# Patient Record
Sex: FEMALE | Race: WHITE | Employment: OTHER | URBAN - METROPOLITAN AREA
[De-identification: names, ages, dates, MRNs, and addresses within clinical notes are randomized per-mention and may not be internally consistent; named-entity substitution may affect disease eponyms.]

---

## 2017-01-30 ENCOUNTER — TELEPHONE (OUTPATIENT)
Dept: CARDIOLOGY CLINIC | Age: 67
End: 2017-01-30

## 2017-01-30 NOTE — TELEPHONE ENCOUNTER
Called patient. Verified patient's identity with two identifiers. Patient states she is pretty sure she took xarelto this morning, but asked if she should take another in case she didn't. I advised she not take another and resume tomorrow. Patient verbalizes understanding and denies further questions or concerns.

## 2017-01-30 NOTE — TELEPHONE ENCOUNTER
Please call Ms. King at [phone]. She got interrupted during the time in which she normally takes her Xarelto so she can't remember if she took it or not. She's afraid if she didn't take it that she may have a stroke and if she already took it that it would be a problem.      Thank you, Yvonne Maher

## 2017-03-03 ENCOUNTER — TELEPHONE (OUTPATIENT)
Dept: INTERNAL MEDICINE CLINIC | Age: 67
End: 2017-03-03

## 2017-03-03 NOTE — TELEPHONE ENCOUNTER
Patient wants to make sure she never gets Levaquin or anything in that drug family again -- would like that put in her chart.

## 2017-03-03 NOTE — TELEPHONE ENCOUNTER
Spoke with pt - states MD gave her Levaquin - she read the insert that said it can cause \"tendons to burst\" - she only took one pill. She now has pain in her right wrist and ankle but not all the time. When reviewing her chart - advised  Her this was given to her back in August of 2016. She said she knew and that she has had these issues ever since she took that one pill. Asked her why she had not called before - she didn't feel the need to until now. Advised her that her MD would not prescribe a medication that would harm her. That medication company has to put any side effect that may have occurred. Does not mean it will happen to her. She said she did not want medication ever again - wants it put in chart. This has been done.  Forward message to MD.

## 2017-03-17 DIAGNOSIS — I48.20 CHRONIC ATRIAL FIBRILLATION (HCC): ICD-10-CM

## 2017-03-17 DIAGNOSIS — I10 ESSENTIAL HYPERTENSION: ICD-10-CM

## 2017-03-17 NOTE — TELEPHONE ENCOUNTER
Requested Prescriptions     Signed Prescriptions Disp Refills    rivaroxaban (XARELTO) 20 mg tab tablet 90 Tab 3     Sig: Take 1 Tab by mouth daily.      Authorizing Provider: Seth Delgado     Ordering User: Norvell Bosch Per Dr. Leopoldo Peak verbal orders

## 2017-03-27 ENCOUNTER — TELEPHONE (OUTPATIENT)
Dept: INTERNAL MEDICINE CLINIC | Age: 67
End: 2017-03-27

## 2017-03-30 ENCOUNTER — OFFICE VISIT (OUTPATIENT)
Dept: CARDIOLOGY CLINIC | Age: 67
End: 2017-03-30

## 2017-03-30 ENCOUNTER — OFFICE VISIT (OUTPATIENT)
Dept: INTERNAL MEDICINE CLINIC | Age: 67
End: 2017-03-30

## 2017-03-30 ENCOUNTER — HOSPITAL ENCOUNTER (OUTPATIENT)
Dept: LAB | Age: 67
Discharge: HOME | End: 2017-03-30
Payer: MEDICARE

## 2017-03-30 VITALS
RESPIRATION RATE: 16 BRPM | WEIGHT: 202.6 LBS | BODY MASS INDEX: 38.25 KG/M2 | OXYGEN SATURATION: 97 % | DIASTOLIC BLOOD PRESSURE: 90 MMHG | HEIGHT: 61 IN | HEART RATE: 67 BPM | SYSTOLIC BLOOD PRESSURE: 136 MMHG

## 2017-03-30 VITALS
TEMPERATURE: 98.6 F | HEART RATE: 57 BPM | HEIGHT: 61 IN | WEIGHT: 202.8 LBS | BODY MASS INDEX: 38.29 KG/M2 | OXYGEN SATURATION: 95 % | SYSTOLIC BLOOD PRESSURE: 140 MMHG | DIASTOLIC BLOOD PRESSURE: 90 MMHG | RESPIRATION RATE: 16 BRPM

## 2017-03-30 DIAGNOSIS — Z13.5 GLAUCOMA SCREENING: ICD-10-CM

## 2017-03-30 DIAGNOSIS — I10 ESSENTIAL HYPERTENSION: ICD-10-CM

## 2017-03-30 DIAGNOSIS — F41.9 ANXIETY: ICD-10-CM

## 2017-03-30 DIAGNOSIS — Z12.39 BREAST CANCER SCREENING: ICD-10-CM

## 2017-03-30 DIAGNOSIS — I48.20 CHRONIC ATRIAL FIBRILLATION (HCC): Chronic | ICD-10-CM

## 2017-03-30 DIAGNOSIS — E78.2 MIXED HYPERLIPIDEMIA: ICD-10-CM

## 2017-03-30 DIAGNOSIS — G47.33 OSA (OBSTRUCTIVE SLEEP APNEA): ICD-10-CM

## 2017-03-30 DIAGNOSIS — R73.01 IFG (IMPAIRED FASTING GLUCOSE): Primary | ICD-10-CM

## 2017-03-30 DIAGNOSIS — I48.20 CHRONIC ATRIAL FIBRILLATION (HCC): Primary | ICD-10-CM

## 2017-03-30 PROCEDURE — 80053 COMPREHEN METABOLIC PANEL: CPT

## 2017-03-30 PROCEDURE — 85025 COMPLETE CBC W/AUTO DIFF WBC: CPT

## 2017-03-30 PROCEDURE — 80061 LIPID PANEL: CPT

## 2017-03-30 PROCEDURE — 36415 COLL VENOUS BLD VENIPUNCTURE: CPT

## 2017-03-30 PROCEDURE — 83036 HEMOGLOBIN GLYCOSYLATED A1C: CPT

## 2017-03-30 NOTE — MR AVS SNAPSHOT
Visit Information Date & Time Provider Department Dept. Phone Encounter #  
 3/30/2017  2:00 PM Harry Baron MD [facility] [place] [facility] [phone] [id number] Follow-up Instructions Return in about 6 months (around 9/30/2017). Your Appointments 3/30/2017  2:40 PM  
ROUTINE CARE with Trish Leong MD  
[address] [address] Internal Medicine [facility]) Appt Note: f/u; f/u  
 [address] [facility] [address]  
[address] [facility] [place] [address] Upcoming Health Maintenance Date Due  
 GLAUCOMA SCREENING Q2Y 11/18/2013 Pneumococcal 65+ Low/Medium Risk (2 of 2 - PCV13) 5/2/2015 INFLUENZA AGE 9 TO ADULT 8/1/2016 MEDICARE YEARLY EXAM 10/7/2016 BREAST CANCER SCRN MAMMOGRAM 3/27/2017 COLONOSCOPY 11/30/2022 DTaP/Tdap/Td series (2 - Td) 4/6/2024 Allergies as of 3/30/2017  Review Complete On: 3/30/2017 By: Harry Baron MD  
  
 Severity Noted Reaction Type Reactions Latex  11/29/2012    Other (comments) \"Trouble breathing\" Cardizem [Diltiazem Hcl]  11/30/2012    Unknown (comments) Ceclor [Cefaclor]  11/30/2012    Unknown (comments) Coffee (Coffea Arabica)  11/30/2012    Other (comments) Causes swollen neck glands Also states allergy to tea with same reaction Flexeril [Cyclobenzaprine]  11/30/2012    Other (comments)  
 insomnia Levaquin [Levofloxacin]  03/03/2017    Other (comments) Pt does not want medication since reading insert. Ludiomil  11/30/2012    Other (comments) depression Macrodantin [Nitrofurantoin Macrocrystalline]  11/30/2012    Unknown (comments) Other Medication  11/29/2012    Other (comments) Side effects to some BP meds. Unsure of names. Pt will call Dr. Lauren Rios office and have them fax the information. Information received and entered into chart. Allergic to syntex. Plendil [Felodipine]  11/30/2012    Other (comments) Flushing, chest pain Prinivil [Lisinopril]  11/30/2012    Unknown (comments) Procardia [Nifedipine]  11/30/2012    Other (comments) Burning feeling in brain. XL  
 Provera [Medroxyprogesterone]  11/30/2012    Other (comments) Finger pain Shellfish Derived  11/30/2012    Unknown (comments) Allergic to shrimp. Singulair [Montelukast]  11/30/2012    Other (comments)  
 faintness Tenoretic 100 [Atenolol-chlorthalidone]  11/30/2012    Other (comments) PO - headache Tenormin [Atenolol]  11/30/2012    Unknown (comments) Vistaril [Hydroxyzine Pamoate]  11/30/2012    Unknown (comments) Zocor [Simvastatin]  11/30/2012    Other (comments) Leg weakness Current Immunizations  Reviewed on 11/30/2012 Name Date Influenza High Dose Vaccine PF 10/22/2015 Influenza Vaccine Whole 9/30/2012 Pneumococcal Polysaccharide (PPSV-23) 5/2/2014  3:19 PM  
 Tdap 4/6/2014 Zoster Vaccine, Live 5/1/2015 Not reviewed this visit You Were Diagnosed With   
  
 Codes Comments Chronic atrial fibrillation (HCC)    -  Primary ICD-10-CM: O91.9 ICD-9-CM: 427.31 Mixed hyperlipidemia     ICD-10-CM: E78.2 ICD-9-CM: 272.2 Essential hypertension     ICD-10-CM: I10 
ICD-9-CM: 401.9 BERNIE (obstructive sleep apnea)     ICD-10-CM: G47.33 
ICD-9-CM: 327.23 Vitals BP Pulse Resp Height(growth percentile) Weight(growth percentile) SpO2  
 136/90 (BP 1 Location: Left arm, BP Patient Position: Sitting) 67 16 5' 1\" (1.549 m) 202 lb 9.6 oz (91.9 kg) 97% BMI OB Status Smoking Status 38.28 kg/m2 Postmenopausal Never Smoker Vitals History BMI and BSA Data Body Mass Index Body Surface Area  
 38.28 kg/m 2 1.99 m 2 Preferred Pharmacy Pharmacy Name Phone [address] [facility], [address] [facility] [address] Your Updated Medication List  
  
   
 This list is accurate as of: 3/30/17  2:28 PM.  Always use your most recent med list.  
  
  
  
  
 benazepril 10 mg tablet Commonly known as:  LOTENSIN  
TAKE 1 TABLET BY MOUTH TWICE A DAY  
  
 CATAPRES-TTS-3 0.3 mg/24 hr  
Generic drug:  cloNIDine  
ONE PATCH EVERY 8 DAYS Cholecalciferol (Vitamin D3) 3,000 unit Tab Take 3,000 Units by mouth daily. FISH -1,200 mg Cap Generic drug:  omega-3 fatty acids-fish oil Take 2 Tabs by mouth daily. lovastatin 20 mg tablet Commonly known as:  MEVACOR  
TAKE 1 TABLET BY MOUTH DAILY  
  
 rivaroxaban 20 mg Tab tablet Commonly known as:  Curtis Eulalio Take 1 Tab by mouth daily. SIMBRINZA 1-0.2 % Drps Generic drug:  brinzolamide-brimonidine Apply  to eye two (2) times a day. TUMS PO Take  by mouth as needed. verapamil  mg tablet Commonly known as:  CALAN-SR  
TAKE 1 TABLET BY MOUTH 2 TIMES A DAY  
  
 VITAMIN C 500 mg tablet Generic drug:  ascorbic acid (vitamin C) Take 1,000 mg by mouth nightly. Follow-up Instructions Return in about 6 months (around 9/30/2017). Introducing \[facility]\"" & HEALTH SERVICES! Dear Yolie Brown: 
Thank you for requesting a Rallyware account. Our records indicate that you already have an active Rallyware account. You can access your account anytime at [email or web address] Did you know that you can access your hospital and ER discharge instructions at any time in Rallyware? You can also review all of your test results from your hospital stay or ER visit. Additional Information If you have questions, please visit the Frequently Asked Questions section of the Rallyware website at [email or web address] Remember, Rallyware is NOT to be used for urgent needs. For medical emergencies, dial 911. Now available from your iPhone and Android! Please provide this summary of care documentation to your next provider. Your primary care clinician is listed as MAGDALENE MILLER. If you have any questions after today's visit, please call [phone].

## 2017-03-30 NOTE — MR AVS SNAPSHOT
Visit Information Date & Time Provider Department Dept. Phone Encounter #  
 3/30/2017  2:40 PM Otilia Thorpe, [address] [facility] Internal Medicine [phone] [id number] Follow-up Instructions Return in about 6 months (around 9/30/2017). Your Appointments 10/4/2017  2:00 PM  
ESTABLISHED PATIENT with Nicolás Hernadez MD  
[facility] ([facility]) Appt Note: 6 month follow up  
 [address] [place] [address]  
[address] [address] Upcoming Health Maintenance Date Due  
 GLAUCOMA SCREENING Q2Y 11/18/2013 Pneumococcal 65+ Low/Medium Risk (2 of 2 - PCV13) 5/2/2015 INFLUENZA AGE 9 TO ADULT 8/1/2016 MEDICARE YEARLY EXAM 10/7/2016 BREAST CANCER SCRN MAMMOGRAM 3/27/2017 COLONOSCOPY 11/30/2022 DTaP/Tdap/Td series (2 - Td) 4/6/2024 Allergies as of 3/30/2017  Review Complete On: 3/30/2017 By: Otilia Thorpe MD  
  
 Severity Noted Reaction Type Reactions Latex  11/29/2012    Other (comments) \"Trouble breathing\" Cardizem [Diltiazem Hcl]  11/30/2012    Unknown (comments) Ceclor [Cefaclor]  11/30/2012    Unknown (comments) Coffee (Coffea Arabica)  11/30/2012    Other (comments) Causes swollen neck glands Also states allergy to tea with same reaction Flexeril [Cyclobenzaprine]  11/30/2012    Other (comments)  
 insomnia Levaquin [Levofloxacin]  03/03/2017    Other (comments) Pt does not want medication since reading insert. - tendon pain Ludiomil  11/30/2012    Other (comments) depression Macrodantin [Nitrofurantoin Macrocrystalline]  11/30/2012    Unknown (comments) Other Medication  11/29/2012    Other (comments) Side effects to some BP meds. Unsure of names. Pt will call Dr. Patrice Rowley office and have them fax the information. Information received and entered into chart. Allergic to syntex. Plendil [Felodipine]  11/30/2012    Other (comments) Flushing, chest pain Prinivil [Lisinopril]  11/30/2012    Unknown (comments) Procardia [Nifedipine]  11/30/2012    Other (comments) Burning feeling in brain. XL  
 Provera [Medroxyprogesterone]  11/30/2012    Other (comments) Finger pain Shellfish Derived  11/30/2012    Unknown (comments) Allergic to shrimp. Singulair [Montelukast]  11/30/2012    Other (comments)  
 faintness Tenoretic 100 [Atenolol-chlorthalidone]  11/30/2012    Other (comments) PO - headache Tenormin [Atenolol]  11/30/2012    Unknown (comments) Vistaril [Hydroxyzine Pamoate]  11/30/2012    Unknown (comments) Zocor [Simvastatin]  11/30/2012    Other (comments) Leg weakness Current Immunizations  Reviewed on 3/30/2017 Name Date Influenza High Dose Vaccine PF 11/1/2016, 10/22/2015 Influenza Vaccine Whole 9/30/2012 Pneumococcal Conjugate (PCV-13) 11/1/2016 Pneumococcal Polysaccharide (PPSV-23) 5/2/2014  3:19 PM  
 Tdap 4/6/2014 Zoster Vaccine, Live 5/1/2015 Reviewed by Melani Mckeon on 3/30/2017 at  3:14 PM  
 Reviewed by Misael Gallagher MD on 3/30/2017 at  3:21 PM  
You Were Diagnosed With   
  
 Codes Comments IFG (impaired fasting glucose)    -  Primary ICD-10-CM: R73.01 
ICD-9-CM: 790.21 Breast cancer screening     ICD-10-CM: Z12.39 
ICD-9-CM: V76.10 Glaucoma screening     ICD-10-CM: Z13.5 ICD-9-CM: V80.1 Mixed hyperlipidemia     ICD-10-CM: E78.2 ICD-9-CM: 272.2 Chronic atrial fibrillation (HCC)     ICD-10-CM: I39.7 ICD-9-CM: 427.31 Anxiety     ICD-10-CM: F41.9 ICD-9-CM: 300.00 Vitals BP Pulse Temp Resp Height(growth percentile) Weight(growth percentile) 140/90 (BP 1 Location: Right arm, BP Patient Position: Sitting) (!) 57 98.6 °F (37 °C) (Oral) 16 5' 1\" (1.549 m) 202 lb 12.8 oz (92 kg) SpO2 BMI OB Status Smoking Status 95% 38.32 kg/m2 Postmenopausal Never Smoker Vitals History BMI and BSA Data Body Mass Index Body Surface Area  
 38.32 kg/m 2 1.99 m 2 Preferred Pharmacy Pharmacy Name Phone [id number] Debbie Esquivel, [address] [facility] [phone] Your Updated Medication List  
  
   
This list is accurate as of: 3/30/17  3:38 PM.  Always use your most recent med list.  
  
  
  
  
 benazepril 10 mg tablet Commonly known as:  LOTENSIN  
TAKE 1 TABLET BY MOUTH TWICE A DAY  
  
 CATAPRES-TTS-3 0.3 mg/24 hr  
Generic drug:  cloNIDine  
ONE PATCH EVERY 8 DAYS Cholecalciferol (Vitamin D3) 3,000 unit Tab Take 3,000 Units by mouth daily. FISH -1,200 mg Cap Generic drug:  omega-3 fatty acids-fish oil Take 2 Tabs by mouth daily. lovastatin 20 mg tablet Commonly known as:  MEVACOR  
TAKE 1 TABLET BY MOUTH DAILY  
  
 rivaroxaban 20 mg Tab tablet Commonly known as:  Danitza Daunt Take 1 Tab by mouth daily. SIMBRINZA 1-0.2 % Drps Generic drug:  brinzolamide-brimonidine Apply  to eye two (2) times a day. TUMS PO Take  by mouth as needed. verapamil  mg tablet Commonly known as:  CALAN-SR  
TAKE 1 TABLET BY MOUTH 2 TIMES A DAY  
  
 VITAMIN C 500 mg tablet Generic drug:  ascorbic acid (vitamin C) Take 1,000 mg by mouth nightly. We Performed the Following CBC WITH AUTOMATED DIFF [50327 CPT(R)] HEMOGLOBIN A1C WITH EAG [01721 CPT(R)] LIPID PANEL [74551 CPT(R)] METABOLIC PANEL, COMPREHENSIVE [28512 CPT(R)] REFERRAL TO OPHTHALMOLOGY [REF57 Custom] Follow-up Instructions Return in about 6 months (around 9/30/2017). To-Do List   
 03/31/2017 Imaging:  [facility] BI SCREENING INCL CAD Referral Information Referral ID Referred By Referred To  
  
 [id number] MAGDALENE MILLER Not Available Visits Status Start Date End Date 1 New Request 3/30/17 3/30/18 If your referral has a status of pending review or denied, additional information will be sent to support the outcome of this decision. Introducing [facility] & HEALTH SERVICES! Dear Leonid Pacheco: 
Thank you for requesting a Airbnb account. Our records indicate that you already have an active Airbnb account. You can access your account anytime at [email or web address] Did you know that you can access your hospital and ER discharge instructions at any time in Airbnb? You can also review all of your test results from your hospital stay or ER visit. Additional Information If you have questions, please visit the Frequently Asked Questions section of the Airbnb website at [email or web address] Remember, Airbnb is NOT to be used for urgent needs. For medical emergencies, dial 911. Now available from your iPhone and Android! Please provide this summary of care documentation to your next provider. Your primary care clinician is listed as MAGDALENE MILLER. If you have any questions after today's visit, please call [phone].

## 2017-03-30 NOTE — PROGRESS NOTES
HISTORY OF PRESENT ILLNESS  Porfirio Menendez is a 74 y.o. female     SUMMARY:   Problem List  Date Reviewed: 3/30/2017          Codes Class Noted    Active advance directive on file ICD-10-CM: Z78.9  ICD-9-CM: V49.89  8/1/2016        Chronic atrial fibrillation ([facility] [address] (Chronic) ICD-10-CM: I48.2  ICD-9-CM: 427.31  10/7/2015        Mixed hyperlipidemia ICD-10-CM: E78.2  ICD-9-CM: 272.2  10/7/2015        Microscopic hematuria ICD-10-CM: R31.29  ICD-9-CM: 599.72  10/7/2015        IFG (impaired fasting glucose) ICD-10-CM: R73.01  ICD-9-CM: 790.21  10/7/2015        Major depression ICD-10-CM: F32.9  ICD-9-CM: 296.20  4/6/2015        BERNIE (obstructive sleep apnea) ICD-10-CM: G47.33  ICD-9-CM: 327.23  11/17/2014        Cerebral aneurysm, nonruptured ICD-10-CM: I67.1  ICD-9-CM: 437.3  8/5/2014        Abnormal involuntary movements(781.0) ICD-9-CM: 781.0  8/4/2014        Atrial fibrillation (HCC) (Chronic) ICD-10-CM: I48.91  ICD-9-CM: 427.31  8/4/2014        Disturbance of skin sensation ICD-10-CM: R20.9  ICD-9-CM: 782.0  8/3/2014        Sleep apnea ICD-10-CM: G47.30  ICD-9-CM: 780.57  5/13/2014        Chest pain ICD-10-CM: R07.9  ICD-9-CM: 786.50  5/13/2014              Current Outpatient Prescriptions on File Prior to Visit   Medication Sig    rivaroxaban (XARELTO) 20 mg tab tablet Take 1 Tab by mouth daily.  lovastatin (MEVACOR) 20 mg tablet TAKE 1 TABLET BY MOUTH DAILY    benazepril (LOTENSIN) 10 mg tablet TAKE 1 TABLET BY MOUTH TWICE A DAY    CATAPRES-TTS-3 0.3 mg/24 hr ONE PATCH EVERY 8 DAYS    verapamil ER (CALAN-SR) 120 mg tablet TAKE 1 TABLET BY MOUTH 2 TIMES A DAY    brinzolamide-brimonidine (SIMBRINZA) 1-0.2 % drps Apply  to eye two (2) times a day.  ascorbic acid (VITAMIN C) 500 mg tablet Take 1,000 mg by mouth nightly.  Cholecalciferol, Vitamin D3, 3,000 unit tab Take 3,000 Units by mouth daily.  omega-3 fatty acids-fish oil (FISH OIL) 360-1,200 mg cap Take 2 Tabs by mouth daily.     CALCIUM CARBONATE (TUMS PO) Take  by mouth as needed. No current facility-administered medications on file prior to visit. CARDIOLOGY STUDIES TO DATE:  5/14 echo normal lvef, lae  514 lexiscan cardiolyte stress neg, lvef 70%      Chief Complaint   Patient presents with    Irregular Heart Beat     HPI :  Ms. Brady Winslow continues to have a lot of different complaints. She thinks her CPAP is not working. She is frustrated with her inability to get out and about. She is depressed, etc., but no cardiac complaints or concerns. She is still worried that she may have a stroke because of her atrial fib.     CARDIAC ROS:   negative for chest pain, dyspnea, palpitations, syncope, orthopnea, paroxysmal nocturnal dyspnea, exertional chest pressure/discomfort    Family History   Problem Relation Age of Onset    Diabetes Mother     Asthma Mother     Lung Disease Mother     Glaucoma Mother     Cancer Father     Heart Disease Father     Lung Disease Father     Psychiatric Disorder Father     Stroke Father     Other Sister      arthritis       Past Medical History:   Diagnosis Date    Aneurysm ([facility] [address]     supraclinoid/cerebral    Arthritis     Depression     Hypertension     Other ill-defined conditions(799.89)     glaucoma    Other ill-defined conditions(799.89)     increased cholesterol    Other ill-defined conditions(799.89)     scoliosis    Other ill-defined conditions(799.89)     back pain    Other ill-defined conditions(799.89)     insomnia    Other ill-defined conditions(799.89)     abnormal wt gain    Other ill-defined conditions(799.89)     breast lump - left side at 3 o'clock position    Other ill-defined conditions(799.89) 1994    shingles    Other ill-defined conditions(799.89)     CTS    Other ill-defined conditions(799.89)     colon polyps    Other ill-defined conditions(799.89)     cataracts    Other ill-defined conditions(799.89)     vitamin D deficiency - hx of    Other ill-defined conditions(799.89)     REYES    Psychiatric disorder     depression/personality disorder/OCD    Scoliosis     Sleep apnea     Unspecified sleep apnea     sleep study 15 yrs ago/was told to come back and get a CPAP, but pt did not       GENERAL ROS:  A comprehensive review of systems was negative except for: Respiratory: positive for cough    Visit Vitals    /90 (BP 1 Location: Left arm, BP Patient Position: Sitting)    Pulse 67    Resp 16    Ht 5' 1\" (1.549 m)    Wt 202 lb 9.6 oz (91.9 kg)    SpO2 97%    BMI 38.28 kg/m2       Wt Readings from Last 3 Encounters:   03/30/17 202 lb 9.6 oz (91.9 kg)   08/02/16 201 lb (91.2 kg)   08/01/16 197 lb (89.4 kg)            BP Readings from Last 3 Encounters:   03/30/17 136/90   08/02/16 110/76   08/01/16 130/70       PHYSICAL EXAM  General appearance: alert, cooperative, no distress, appears stated age  Neck: supple, symmetrical, trachea midline, no adenopathy, no carotid bruit and no JVD  Lungs: clear to auscultation bilaterally  Heart: irregularly irregular rhythm, S1, S2 normal, no S3 or S4  Extremities: edema tr    Lab Results   Component Value Date/Time    Cholesterol, total 176 08/01/2016 05:05 PM    Cholesterol, total 179 10/07/2015 03:59 PM    Cholesterol, total 185 04/06/2015 12:00 AM    Cholesterol, total 169 05/02/2014 04:25 AM    Cholesterol, total 153 12/13/2009 05:00 AM    HDL Cholesterol 54 08/01/2016 05:05 PM    HDL Cholesterol 73 10/07/2015 03:59 PM    HDL Cholesterol 70 04/06/2015 12:00 AM    HDL Cholesterol 62 05/02/2014 04:25 AM    HDL Cholesterol 49 12/13/2009 05:00 AM    LDL, calculated 56 08/01/2016 05:05 PM    LDL, calculated 76 10/07/2015 03:59 PM    LDL, calculated 76 04/06/2015 12:00 AM    LDL, calculated 63.8 05/02/2014 04:25 AM    LDL, calculated 82.2 12/13/2009 05:00 AM    Triglyceride 328 08/01/2016 05:05 PM    Triglyceride 148 10/07/2015 03:59 PM    Triglyceride 194 04/06/2015 12:00 AM    Triglyceride 216 05/02/2014 04:25 AM Triglyceride 109 12/13/2009 05:00 AM    CHOL/HDL Ratio 2.7 05/02/2014 04:25 AM    CHOL/HDL Ratio 3.1 12/13/2009 05:00 AM     ASSESSMENT  Ms. Jonathon Bernard appears to be stable and asymptomatic from a cardiac standpoint on a reasonable medical regimen. She needs no cardiac testing at this time. I tried to reassure her that everything that should be done with her atrial fibrillation is being done and not to worry. current treatment plan is effective, no change in therapy  lab results and schedule of future lab studies reviewed with patient  reviewed diet, exercise and weight control    Encounter Diagnoses   Name Primary?  Chronic atrial fibrillation (HCC) Yes    Mixed hyperlipidemia     Essential hypertension     BERNIE (obstructive sleep apnea)      No orders of the defined types were placed in this encounter. Follow-up Disposition:  Return in about 6 months (around 9/30/2017).     Sophia Avendano MD  3/30/2017

## 2017-03-30 NOTE — PROGRESS NOTES
HISTORY OF PRESENT ILLNESS  Sunni Limon is a 74 y.o. female. HPI Hay Ulrich is seen today for follow up of hyperlipidemia and other concerns. 1. Toenail problem. She has toenail fungus. Reviewed treatment options. She is not interested. Reviewed with her she would likely qualify for nail care based on this diagnosis. She will see a podiatrist if she is able to get transportation. 2. Skin lesion on the back of her right leg which turns out to be a seborrheic keratosis and she was reassured. 3. Essential hypertension. Stable on current regimen. 4. Mixed hyperlipidemia, IFG. Due for routine labs. 5. Atrial fibrillation. Up to date with cardiology follow up. She saw Dr. Jevon Sauer today. 6. Obstructive sleep apnea. She uses a CPAP apparatus. 7. Anxiety, depression. She denies carrying these diagnoses. We have discussed this in the past.  She clearly has anxiety. She declines medication treatment because she \"cannot take generics. \"  She has been on brand Prozac and Zoloft in the distant past.      MedDATA/gwo     Current Outpatient Prescriptions   Medication Sig    rivaroxaban (XARELTO) 20 mg tab tablet Take 1 Tab by mouth daily.  lovastatin (MEVACOR) 20 mg tablet TAKE 1 TABLET BY MOUTH DAILY    benazepril (LOTENSIN) 10 mg tablet TAKE 1 TABLET BY MOUTH TWICE A DAY    CATAPRES-TTS-3 0.3 mg/24 hr ONE PATCH EVERY 8 DAYS    verapamil ER (CALAN-SR) 120 mg tablet TAKE 1 TABLET BY MOUTH 2 TIMES A DAY    brinzolamide-brimonidine (SIMBRINZA) 1-0.2 % drps Apply  to eye two (2) times a day.  ascorbic acid (VITAMIN C) 500 mg tablet Take 1,000 mg by mouth nightly.  Cholecalciferol, Vitamin D3, 3,000 unit tab Take 3,000 Units by mouth daily.  omega-3 fatty acids-fish oil (FISH OIL) 360-1,200 mg cap Take 2 Tabs by mouth daily.  CALCIUM CARBONATE (TUMS PO) Take  by mouth as needed. No current facility-administered medications for this visit.           Review of Systems   Constitutional: Negative for weight loss. Respiratory: Negative. Cardiovascular: Negative for chest pain, palpitations, leg swelling and PND. Musculoskeletal: Negative for myalgias. Skin: Positive for rash. Neurological: Negative for focal weakness. Psychiatric/Behavioral: The patient is nervous/anxious. Physical Exam   Constitutional: She appears well-nourished. Neck: Carotid bruit is not present. Cardiovascular: Normal rate. An irregularly irregular rhythm present. Exam reveals no gallop and no friction rub. No murmur heard. Pulmonary/Chest: Effort normal and breath sounds normal. No respiratory distress. Musculoskeletal: She exhibits edema. Feet:    Trace stasis edema bilateral lower extremities. Skin:        Nursing note and vitals reviewed. ASSESSMENT and Zaid Prado was seen today for medication evaluation, nail problem and cyst.    Diagnoses and all orders for this visit:    IFG (impaired fasting glucose)  -     HEMOGLOBIN A1C WITH EAG  -     METABOLIC PANEL, COMPREHENSIVE    Breast cancer screening  -     CARL MAMMO BI SCREENING INCL CAD; Future    Glaucoma screening  -     REFERRAL TO OPHTHALMOLOGY    Mixed hyperlipidemia  -     LIPID PANEL  -     CBC WITH AUTOMATED DIFF    Chronic atrial fibrillation [facility])- See cardiologist as directed.      Anxiety- Follow off treatment     Other orders  -     CBC WITH AUTOMATED DIFF  -     METABOLIC PANEL, COMPREHENSIVE  -     LIPID PANEL  -     HEMOGLOBIN A1C

## 2017-03-31 LAB
ALBUMIN SERPL-MCNC: 4.3 G/DL (ref 3.6–4.8)
ALBUMIN/GLOB SERPL: 1.9 {RATIO} (ref 1.2–2.2)
ALP SERPL-CCNC: 53 IU/L (ref 39–117)
ALT SERPL-CCNC: 17 IU/L (ref 0–32)
AST SERPL-CCNC: 19 IU/L (ref 0–40)
BASOPHILS # BLD AUTO: 0 X10E3/UL (ref 0–0.2)
BASOPHILS NFR BLD AUTO: 0 %
BILIRUB SERPL-MCNC: 0.3 MG/DL (ref 0–1.2)
BUN SERPL-MCNC: 15 MG/DL (ref 8–27)
BUN/CREAT SERPL: 19 (ref 11–26)
CALCIUM SERPL-MCNC: 9.5 MG/DL (ref 8.7–10.3)
CHLORIDE SERPL-SCNC: 104 MMOL/L (ref 96–106)
CHOLEST SERPL-MCNC: 166 MG/DL (ref 100–199)
CO2 SERPL-SCNC: 28 MMOL/L (ref 18–29)
CREAT SERPL-MCNC: 0.79 MG/DL (ref 0.57–1)
EOSINOPHIL # BLD AUTO: 0.1 X10E3/UL (ref 0–0.4)
EOSINOPHIL NFR BLD AUTO: 1 %
ERYTHROCYTE [DISTWIDTH] IN BLOOD BY AUTOMATED COUNT: 13.5 % (ref 12.3–15.4)
EST. AVERAGE GLUCOSE BLD GHB EST-MCNC: 123 MG/DL
GLOBULIN SER CALC-MCNC: 2.3 G/DL (ref 1.5–4.5)
GLUCOSE SERPL-MCNC: 111 MG/DL (ref 65–99)
HBA1C MFR BLD: 5.9 % (ref 4.8–5.6)
HCT VFR BLD AUTO: 44.2 % (ref 34–46.6)
HDLC SERPL-MCNC: 56 MG/DL
HGB BLD-MCNC: 14.9 G/DL (ref 11.1–15.9)
IMM GRANULOCYTES # BLD: 0 X10E3/UL (ref 0–0.1)
IMM GRANULOCYTES NFR BLD: 0 %
LDLC SERPL CALC-MCNC: 64 MG/DL (ref 0–99)
LYMPHOCYTES # BLD AUTO: 1.6 X10E3/UL (ref 0.7–3.1)
LYMPHOCYTES NFR BLD AUTO: 17 %
MCH RBC QN AUTO: 30.7 PG (ref 26.6–33)
MCHC RBC AUTO-ENTMCNC: 33.7 G/DL (ref 31.5–35.7)
MCV RBC AUTO: 91 FL (ref 79–97)
MONOCYTES # BLD AUTO: 0.6 X10E3/UL (ref 0.1–0.9)
MONOCYTES NFR BLD AUTO: 6 %
NEUTROPHILS # BLD AUTO: 7.3 X10E3/UL (ref 1.4–7)
NEUTROPHILS NFR BLD AUTO: 76 %
PLATELET # BLD AUTO: 247 X10E3/UL (ref 150–379)
POTASSIUM SERPL-SCNC: 4.4 MMOL/L (ref 3.5–5.2)
PROT SERPL-MCNC: 6.6 G/DL (ref 6–8.5)
RBC # BLD AUTO: 4.86 X10E6/UL (ref 3.77–5.28)
SODIUM SERPL-SCNC: 146 MMOL/L (ref 134–144)
TRIGL SERPL-MCNC: 232 MG/DL (ref 0–149)
VLDLC SERPL CALC-MCNC: 46 MG/DL (ref 5–40)
WBC # BLD AUTO: 9.6 X10E3/UL (ref 3.4–10.8)

## 2017-04-07 ENCOUNTER — TELEPHONE (OUTPATIENT)
Dept: INTERNAL MEDICINE CLINIC | Age: 67
End: 2017-04-07

## 2017-04-12 NOTE — TELEPHONE ENCOUNTER
Pt returned call informed pt of Dr. Adrienne Padron lab result 4/5/17 mychart msg. Copy of mychart msg and lab results have been placed to be mailed to the pt per her request. Pt states she had her last eye exam at Dr. Sam Crystal office informed pt we would call them to have last eye exam report sent over. Pt also requesting recommendations for 3d mammogram services, [facility] so she can pick which she prefers to call to schedule.

## 2017-04-28 ENCOUNTER — TELEPHONE (OUTPATIENT)
Dept: INTERNAL MEDICINE CLINIC | Age: 67
End: 2017-04-28

## 2017-04-28 NOTE — TELEPHONE ENCOUNTER
Spoke with pt - states she is requesting CXR for a cough she's had for about a month. States she was to get done last year for same reason but never did it. An order is in chart from 8/1/16. Will forward to MD for approval. Calderon Meredith will call her Monday when MD returns to office.

## 2017-05-01 DIAGNOSIS — R05.9 COUGH: Primary | ICD-10-CM

## 2017-05-03 ENCOUNTER — OFFICE VISIT (OUTPATIENT)
Dept: SLEEP MEDICINE | Age: 67
End: 2017-05-03

## 2017-05-03 VITALS
HEIGHT: 61 IN | HEART RATE: 92 BPM | DIASTOLIC BLOOD PRESSURE: 76 MMHG | SYSTOLIC BLOOD PRESSURE: 130 MMHG | OXYGEN SATURATION: 97 % | WEIGHT: 197 LBS | BODY MASS INDEX: 37.19 KG/M2

## 2017-05-03 DIAGNOSIS — G47.33 OSA (OBSTRUCTIVE SLEEP APNEA): Primary | ICD-10-CM

## 2017-05-03 DIAGNOSIS — E66.9 OBESITY (BMI 30-39.9): ICD-10-CM

## 2017-05-03 NOTE — PATIENT INSTRUCTIONS
[address] [place]. [place], [address]  Tel.  [phone]  Fax. [address] [facility] [address]  [place], [address] [facility]  Tel.  [phone]  Fax. [phone] [address]  [place]  Tel.  [phone]  Fax. [phone]     Learning About CPAP for Sleep Apnea  What is CPAP? CPAP is a small machine that you use at home every night while you sleep. It increases air pressure in your throat to keep your airway open. When you have sleep apnea, this can help you sleep better so you feel much better. CPAP stands for \"continuous positive airway pressure. \"  The CPAP machine will have one of the following:  · A mask that covers your nose and mouth  · Prongs that fit into your nose  · A mask that covers your nose only, the most common type. This type is called NCPAP. The N stands for \"nasal.\"  Why is it done? CPAP is usually the best treatment for obstructive sleep apnea. It is the first treatment choice and the most widely used. Your doctor may suggest CPAP if you have:  · Moderate to severe sleep apnea. · Sleep apnea and coronary artery disease (CAD) or heart failure. How does it help? · CPAP can help you have more normal sleep, so you feel less sleepy and more alert during the daytime. · CPAP may help keep heart failure or other heart problems from getting worse. · NCPAP may help lower your blood pressure. · If you use CPAP, your bed partner may also sleep better because you are not snoring or restless. What are the side effects? Some people who use CPAP have:  · A dry or stuffy nose and a sore throat. · Irritated skin on the face. · Sore eyes. · Bloating. If you have any of these problems, work with your doctor to fix them. Here are some things you can try:  · Be sure the mask or nasal prongs fit well. · See if your doctor can adjust the pressure of your CPAP. · If your nose is dry, try a humidifier.   · If your nose is runny or stuffy, try decongestant medicine or a steroid nasal spray. If these things do not help, you might try a different type of machine. Some machines have air pressure that adjusts on its own. Others have air pressures that are different when you breathe in than when you breathe out. This may reduce discomfort caused by too much pressure in your nose. Where can you learn more? Go to Belly.be  Enter Isha Gonsales in the search box to learn more about \"Learning About CPAP for Sleep Apnea. \"   © 8384-5674 Healthwise, Incorporated. Care instructions adapted under license by [employer] (which disclaims liability or warranty for this information). This care instruction is for use with your licensed healthcare professional. If you have questions about a medical condition or this instruction, always ask your healthcare professional. [address] any warranty or liability for your use of this information. Content Version: 3.1.36283; Last Revised: January 11, 2010  PROPER SLEEP HYGIENE    What to avoid  · Do not have drinks with caffeine, such as coffee or black tea, for 8 hours before bed. · Do not smoke or use other types of tobacco near bedtime. Nicotine is a stimulant and can keep you awake. · Avoid drinking alcohol late in the evening, because it can cause you to wake in the middle of the night. · Do not eat a big meal close to bedtime. If you are hungry, eat a light snack. · Do not drink a lot of water close to bedtime, because the need to urinate may wake you up during the night. · Do not read or watch TV in bed. Use the bed only for sleeping and sexual activity. What to try  · Go to bed at the same time every night, and wake up at the same time every morning. Do not take naps during the day. · Keep your bedroom quiet, dark, and cool. · Get regular exercise, but not within 3 to 4 hours of your bedtime. .  · Sleep on a comfortable pillow and mattress.   · If watching the clock makes you anxious, turn it facing away from you so you cannot see the time. · If you worry when you lie down, start a worry book. Well before bedtime, write down your worries, and then set the book and your concerns aside. · Try meditation or other relaxation techniques before you go to bed. · If you cannot fall asleep, get up and go to another room until you feel sleepy. Do something relaxing. Repeat your bedtime routine before you go to bed again. · Make your house quiet and calm about an hour before bedtime. Turn down the lights, turn off the TV, log off the computer, and turn down the volume on music. This can help you relax after a busy day. Drowsy Driving: The [employer] cites drowsiness as a causing factor in more than 207,824 police reported crashes annually, resulting in 76,000 injuries and 1,500 deaths. Other surveys suggest 55% of people polled have driven while drowsy in the past year, 23% had fallen asleep but not crashed, 3% crashed, and 2% had and accident due to drowsy driving. Who is at risk? Young Drivers: One study of drowsy driving accidents states that 55% of the drivers were under 25 years. Of those, 75% were male. Shift Workers and Travelers: People who work overnight or travel across time zones frequently are at higher risk of experiencing Circadian Rhythm Disorders. They are trying to work and function when their body is programed to sleep. Sleep Deprived: Lack of sleep has a serious impact on your ability to pay attention or focus on a task. Consistently getting less than the average of 8 hours your body needs creates partial or cumulative sleep deprivation. Untreated Sleep Disorders: Sleep Apnea, Narcolepsy, R.L.S., and other sleep disorders (untreated) prevent a person from getting enough restful sleep. This leads to excessive daytime sleepiness and increases the risk for drowsy driving accidents by up to 7 times.   Medications / Alcohol: Even over the counter medications can cause drowsiness. Medications that impair a drivers attention should have a warning label. Alcohol naturally makes you sleepy and on its own can cause accidents. Combined with excessive drowsiness its effects are amplified. Signs of Drowsy Driving:   * You don't remember driving the last few miles   * You may drift out of your wilma   * You are unable to focus and your thoughts wander   * You may yawn more often than normal   * You have difficulty keeping your eyes open / nodding off   * Missing traffic signs, speeding, or tailgating  Prevention-   Good sleep hygiene, lifestyle and behavioral choices have the most impact on drowsy driving. There is no substitute for sleep and the average person requires 8 hours nightly. If you find yourself driving drowsy, stop and sleep. Consider the sleep hygiene tips provided during your visit as well. Medication Refill Policy: Refills for all medications require 1 week advance notice. Please have your pharmacy fax a refill request. We are unable to fax, or call in \"controled substance\" medications and you will need to pick these prescriptions up from our office. Yatango Activation    Thank you for requesting access to Yatango. Please follow the instructions below to securely access and download your online medical record. Yatango allows you to send messages to your doctor, view your test results, renew your prescriptions, schedule appointments, and more. How Do I Sign Up? 1. In your internet browser, go to [email or web address]. 2. Click on the First Time User? Click Here link in the Sign In box. You will see the New Member Sign Up page. 3. Enter your Yatango Access Code exactly as it appears below. You will not need to use this code after youve completed the sign-up process. If you do not sign up before the expiration date, you must request a new code. Yatango Access Code:  Activation code not generated  Current Gurubooks Status: Active (This is the date your Gurubooks access code will )    4. Enter the last four digits of your Social Security Number (xxxx) and Date of Birth (mm/dd/yyyy) as indicated and click Submit. You will be taken to the next sign-up page. 5. Create a Gurubooks ID. This will be your Gurubooks login ID and cannot be changed, so think of one that is secure and easy to remember. 6. Create a Gurubooks password. You can change your password at any time. 7. Enter your Password Reset Question and Answer. This can be used at a later time if you forget your password. 8. Enter your e-mail address. You will receive e-mail notification when new information is available in [address] 9. Click Sign Up. You can now view and download portions of your medical record. 10. Click the Download Summary menu link to download a portable copy of your medical information. Additional Information    If you have questions, please call [phone]. Remember, Gurubooks is NOT to be used for urgent needs. For medical emergencies, dial 911. Starting a Weight Loss Plan: After Your Visit  Your Care Instructions  If you are thinking about losing weight, it can be hard to know where to start. Your doctor can help you set up a weight loss plan that best meets your needs. You may want to take a class on nutrition or exercise, or join a weight loss support group. If you have questions about how to make changes to your eating or exercise habits, ask your doctor about seeing a registered dietitian or an exercise specialist.  It can be a big challenge to lose weight. But you do not have to make huge changes at once. Make small changes, and stick with them. When those changes become habit, add a few more changes. If you do not think you are ready to make changes right now, try to pick a date in the future.  Make an appointment to see your doctor to discuss whether the time is right for you to start a plan.  Follow-up care is a key part of your treatment and safety. Be sure to make and go to all appointments, and call your doctor if you are having problems. It's also a good idea to know your test results and keep a list of the medicines you take. How can you care for yourself at home? · Set realistic goals. Many people expect to lose much more weight than is likely. A weight loss of 5% to 10% of your body weight may be enough to improve your health. · Get family and friends involved to provide support. Talk to them about why you are trying to lose weight, and ask them to help. They can help by participating in exercise and having meals with you, even if they may be eating something different. · Find what works best for you. If you do not have time or do not like to cook, a program that offers meal replacement bars or shakes may be better for you. Or if you like to prepare meals, finding a plan that includes daily menus and recipes may be best.  · Ask your doctor about other health professionals who can help you achieve your weight loss goals. ¨ A dietitian can help you make healthy changes in your diet. ¨ An exercise specialist or  can help you develop a safe and effective exercise program.  ¨ A counselor or psychiatrist can help you cope with issues such as depression, anxiety, or family problems that can make it hard to focus on weight loss. · Consider joining a support group for people who are trying to lose weight. Your doctor can suggest groups in your area. Where can you learn more? Go to DataSphere.be  Enter U357 in the search box to learn more about \"Starting a Weight Loss Plan: After Your Visit. \"   © 8813-7109 Healthwise, Incorporated. Care instructions adapted under license by Priyanka Albright (which disclaims liability or warranty for this information).  This care instruction is for use with your licensed healthcare professional. If you have questions about a medical condition or this instruction, always ask your healthcare professional. [place] [address] any warranty or liability for your use of this information.   Content Version: 9.5.20731; Last Revised: September 1, 2009

## 2017-05-03 NOTE — MR AVS SNAPSHOT
Visit Information Date & Time Provider Department Dept. Phone Encounter #  
 5/3/2017  3:20 PM Jorge Ferrari, [facility] [place] [facility] [id number] Follow-up Instructions Return in about 1 year (around 5/3/2018), or if symptoms worsen or fail to improve. Follow-up and Disposition History Your Appointments 7/24/2017  1:00 PM  
ROUTINE CARE with Pooja Hess MD  
[facility] Appt Note: f/u [address] [place]  
[address] [address] [place]  
  
    
 10/4/2017  2:00 PM  
ESTABLISHED PATIENT with August Strong MD  
[facility] ([facility]) Appt Note: 6 month follow up  
 [address] [place] [address]  
[address] [facility] [address] Upcoming Health Maintenance Date Due  
 MEDICARE YEARLY EXAM 10/7/2016 BREAST CANCER SCRN MAMMOGRAM 3/27/2017 INFLUENZA AGE 9 TO ADULT 8/1/2017 GLAUCOMA SCREENING Q2Y 1/12/2019 COLONOSCOPY 11/30/2022 DTaP/Tdap/Td series (2 - Td) 4/6/2024 Allergies as of 5/3/2017  Review Complete On: 5/3/2017 By: Jorge Ferrari MD  
  
 Severity Noted Reaction Type Reactions Latex  11/29/2012    Other (comments) \"Trouble breathing\" Cardizem [Diltiazem Hcl]  11/30/2012    Unknown (comments) Ceclor [Cefaclor]  11/30/2012    Unknown (comments) Coffee (Coffea Arabica)  11/30/2012    Other (comments) Causes swollen neck glands Also states allergy to tea with same reaction Flexeril [Cyclobenzaprine]  11/30/2012    Other (comments)  
 insomnia Levaquin [Levofloxacin]  03/03/2017    Other (comments) Pt does not want medication since reading insert. - tendon pain Ludiomil  11/30/2012    Other (comments) depression Macrodantin [Nitrofurantoin Macrocrystalline]  11/30/2012    Unknown (comments) Other Medication  11/29/2012    Other (comments) Side effects to some BP meds. Unsure of names. Pt will call Dr. Lidya Philip office and have them fax the information. Information received and entered into chart. Allergic to syntex. Plendil [Felodipine]  11/30/2012    Other (comments) Flushing, chest pain Prinivil [Lisinopril]  11/30/2012    Unknown (comments) Procardia [Nifedipine]  11/30/2012    Other (comments) Burning feeling in brain. XL  
 Provera [Medroxyprogesterone]  11/30/2012    Other (comments) Finger pain Shellfish Derived  11/30/2012    Unknown (comments) Allergic to shrimp. Singulair [Montelukast]  11/30/2012    Other (comments)  
 faintness Tenoretic 100 [Atenolol-chlorthalidone]  11/30/2012    Other (comments) PO - headache Tenormin [Atenolol]  11/30/2012    Unknown (comments) Vistaril [Hydroxyzine Pamoate]  11/30/2012    Unknown (comments) Zocor [Simvastatin]  11/30/2012    Other (comments) Leg weakness Current Immunizations  Reviewed on 3/30/2017 Name Date Influenza High Dose Vaccine PF 11/1/2016, 10/22/2015 Influenza Vaccine Whole 9/30/2012 Pneumococcal Conjugate (PCV-13) 11/1/2016 Pneumococcal Polysaccharide (PPSV-23) 5/2/2014  3:19 PM  
 Tdap 4/6/2014 Zoster Vaccine, Live 5/1/2015 Not reviewed this visit You Were Diagnosed With   
  
 Codes Comments BERNIE (obstructive sleep apnea)    -  Primary ICD-10-CM: G47.33 
ICD-9-CM: 327.23 Obesity (BMI 30-39. 9)     ICD-10-CM: E66.9 ICD-9-CM: 278.00 Vitals BP Pulse Height(growth percentile) Weight(growth percentile) SpO2 BMI  
 130/76 92 5' 1\" (1.549 m) 197 lb (89.4 kg) 97% 37.22 kg/m2 OB Status Smoking Status Postmenopausal Never Smoker BMI and BSA Data Body Mass Index Body Surface Area  
 37.22 kg/m 2 1.96 m 2 Preferred Pharmacy Pharmacy Name Phone [address] [facility], [address] [facility] [address] Donnie Michaels Your Updated Medication List  
  
   
This list is accurate as of: 5/3/17  3:41 PM.  Always use your most recent med list.  
  
  
  
  
 benazepril 10 mg tablet Commonly known as:  LOTENSIN  
TAKE 1 TABLET BY MOUTH TWICE A DAY  
  
 CATAPRES-TTS-3 0.3 mg/24 hr  
Generic drug:  cloNIDine  
ONE PATCH EVERY 8 DAYS Cholecalciferol (Vitamin D3) 3,000 unit Tab Take 3,000 Units by mouth daily. FISH -1,200 mg Cap Generic drug:  omega-3 fatty acids-fish oil Take 2 Tabs by mouth daily. lovastatin 20 mg tablet Commonly known as:  MEVACOR  
TAKE 1 TABLET BY MOUTH DAILY  
  
 rivaroxaban 20 mg Tab tablet Commonly known as:  Rodrigo Cork Take 1 Tab by mouth daily. SIMBRINZA 1-0.2 % Drps Generic drug:  brinzolamide-brimonidine Apply  to eye two (2) times a day. TUMS PO Take  by mouth as needed. verapamil  mg tablet Commonly known as:  CALAN-SR  
TAKE 1 TABLET BY MOUTH 2 TIMES A DAY  
  
 VITAMIN C 500 mg tablet Generic drug:  ascorbic acid (vitamin C) Take 1,000 mg by mouth nightly. We Performed the Following AMB SUPPLY ORDER [[id number] Custom] Comments: * Device pressure change to CPAP  10-20 cmH2O. Wireless modem enrollment with privileges to change therapy remotely - indefinite. PAP Mask -patient preference, tubing, filters as needed (all sleep supplies) Length of Need = 99 months Daija Newman M.D.  (electronically signed) Diplomate in Sleep Medicine, [employer] Follow-up Instructions Return in about 1 year (around 5/3/2018), or if symptoms worsen or fail to improve. Patient Instructions [address] [place]. [address] [facility], [address] Tel.  [phone] Fax. 100 [facility] 60 [place], [address] [facility] Tel.  [phone] Fax. [phone] 52 [facility] [address] Tel.  [phone] Fax. [phone] Learning About CPAP for Sleep Apnea What is CPAP? CPAP is a small machine that you use at home every night while you sleep. It increases air pressure in your throat to keep your airway open. When you have sleep apnea, this can help you sleep better so you feel much better. CPAP stands for \"continuous positive airway pressure. \" The CPAP machine will have one of the following: · A mask that covers your nose and mouth · Prongs that fit into your nose · A mask that covers your nose only, the most common type. This type is called NCPAP. The N stands for \"nasal.\" Why is it done? CPAP is usually the best treatment for obstructive sleep apnea. It is the first treatment choice and the most widely used. Your doctor may suggest CPAP if you have: · Moderate to severe sleep apnea. · Sleep apnea and coronary artery disease (CAD) or heart failure. How does it help? · CPAP can help you have more normal sleep, so you feel less sleepy and more alert during the daytime. · CPAP may help keep heart failure or other heart problems from getting worse. · NCPAP may help lower your blood pressure. · If you use CPAP, your bed partner may also sleep better because you are not snoring or restless. What are the side effects? Some people who use CPAP have: · A dry or stuffy nose and a sore throat. · Irritated skin on the face. · Sore eyes. · Bloating. If you have any of these problems, work with your doctor to fix them. Here are some things you can try: · Be sure the mask or nasal prongs fit well. · See if your doctor can adjust the pressure of your CPAP. · If your nose is dry, try a humidifier. · If your nose is runny or stuffy, try decongestant medicine or a steroid nasal spray. If these things do not help, you might try a different type of machine. Some machines have air pressure that adjusts on its own.  Others have air pressures that are different when you breathe in than when you breathe out. This may reduce discomfort caused by too much pressure in your nose. Where can you learn more? Go to Trigger.io.be Enter M655 in the search box to learn more about \"Learning About CPAP for Sleep Apnea. \"  
© 0570-4734 Healthwise, Incorporated. Care instructions adapted under license by Cindi Mujica (which disclaims liability or warranty for this information). This care instruction is for use with your licensed healthcare professional. If you have questions about a medical condition or this instruction, always ask your healthcare professional. [place] [address] any warranty or liability for your use of this information. Content Version: 4.0.33248; Last Revised: January 11, 2010 PROPER SLEEP HYGIENE What to avoid · Do not have drinks with caffeine, such as coffee or black tea, for 8 hours before bed. · Do not smoke or use other types of tobacco near bedtime. Nicotine is a stimulant and can keep you awake. · Avoid drinking alcohol late in the evening, because it can cause you to wake in the middle of the night. · Do not eat a big meal close to bedtime. If you are hungry, eat a light snack. · Do not drink a lot of water close to bedtime, because the need to urinate may wake you up during the night. · Do not read or watch TV in bed. Use the bed only for sleeping and sexual activity. What to try · Go to bed at the same time every night, and wake up at the same time every morning. Do not take naps during the day. · Keep your bedroom quiet, dark, and cool. · Get regular exercise, but not within 3 to 4 hours of your bedtime. Perla Guerin · Sleep on a comfortable pillow and mattress. · If watching the clock makes you anxious, turn it facing away from you so you cannot see the time. · If you worry when you lie down, start a worry book.  Well before bedtime, write down your worries, and then set the book and your concerns aside. · Try meditation or other relaxation techniques before you go to bed. · If you cannot fall asleep, get up and go to another room until you feel sleepy. Do something relaxing. Repeat your bedtime routine before you go to bed again. · Make your house quiet and calm about an hour before bedtime. Turn down the lights, turn off the TV, log off the computer, and turn down the volume on music. This can help you relax after a busy day. Drowsy Driving: The [employer] cites drowsiness as a causing factor in more than 015,674 police reported crashes annually, resulting in 76,000 injuries and 1,500 deaths. Other surveys suggest 55% of people polled have driven while drowsy in the past year, 23% had fallen asleep but not crashed, 3% crashed, and 2% had and accident due to drowsy driving. Who is at risk? Young Drivers: One study of drowsy driving accidents states that 55% of the drivers were under 25 years. Of those, 75% were male. Shift Workers and Travelers: People who work overnight or travel across time zones frequently are at higher risk of experiencing Circadian Rhythm Disorders. They are trying to work and function when their body is programed to sleep. Sleep Deprived: Lack of sleep has a serious impact on your ability to pay attention or focus on a task. Consistently getting less than the average of 8 hours your body needs creates partial or cumulative sleep deprivation. Untreated Sleep Disorders: Sleep Apnea, Narcolepsy, R.L.S., and other sleep disorders (untreated) prevent a person from getting enough restful sleep. This leads to excessive daytime sleepiness and increases the risk for drowsy driving accidents by up to 7 times. Medications / Alcohol: Even over the counter medications can cause drowsiness.  Medications that impair a drivers attention should have a warning label. Alcohol naturally makes you sleepy and on its own can cause accidents. Combined with excessive drowsiness its effects are amplified. Signs of Drowsy Driving: * You don't remember driving the last few miles * You may drift out of your wilma * You are unable to focus and your thoughts wander * You may yawn more often than normal 
 * You have difficulty keeping your eyes open / nodding off * Missing traffic signs, speeding, or tailgating Prevention-  
Good sleep hygiene, lifestyle and behavioral choices have the most impact on drowsy driving. There is no substitute for sleep and the average person requires 8 hours nightly. If you find yourself driving drowsy, stop and sleep. Consider the sleep hygiene tips provided during your visit as well. Medication Refill Policy: Refills for all medications require 1 week advance notice. Please have your pharmacy fax a refill request. We are unable to fax, or call in \"controled substance\" medications and you will need to pick these prescriptions up from our office. Recensus Activation Thank you for requesting access to Recensus. Please follow the instructions below to securely access and download your online medical record. Recensus allows you to send messages to your doctor, view your test results, renew your prescriptions, schedule appointments, and more. How Do I Sign Up? 1. In your internet browser, go to [email or web address]. 2. Click on the First Time User? Click Here link in the Sign In box. You will see the New Member Sign Up page. 3. Enter your Recensus Access Code exactly as it appears below. You will not need to use this code after youve completed the sign-up process. If you do not sign up before the expiration date, you must request a new code. Recensus Access Code: Activation code not generated Current Recensus Status: Active (This is the date your Recensus access code will ) 4. Enter the last four digits of your Social Security Number (xxxx) and Date of Birth (mm/dd/yyyy) as indicated and click Submit. You will be taken to the next sign-up page. 5. Create a Shiftboard Online Scheduling ID. This will be your Shiftboard Online Scheduling login ID and cannot be changed, so think of one that is secure and easy to remember. 6. Create a Shiftboard Online Scheduling password. You can change your password at any time. 7. Enter your Password Reset Question and Answer. This can be used at a later time if you forget your password. 8. Enter your e-mail address. You will receive e-mail notification when new information is available in [address] 9. Click Sign Up. You can now view and download portions of your medical record. 10. Click the Download Summary menu link to download a portable copy of your medical information. Additional Information If you have questions, please call [phone]. Remember, Shiftboard Online Scheduling is NOT to be used for urgent needs. For medical emergencies, dial 911. Starting a Weight Loss Plan: After Your Visit Your Care Instructions If you are thinking about losing weight, it can be hard to know where to start. Your doctor can help you set up a weight loss plan that best meets your needs. You may want to take a class on nutrition or exercise, or join a weight loss support group. If you have questions about how to make changes to your eating or exercise habits, ask your doctor about seeing a registered dietitian or an exercise specialist. 
It can be a big challenge to lose weight. But you do not have to make huge changes at once. Make small changes, and stick with them. When those changes become habit, add a few more changes. If you do not think you are ready to make changes right now, try to pick a date in the future. Make an appointment to see your doctor to discuss whether the time is right for you to start a plan. Follow-up care is a key part of your treatment and safety.  Be sure to make and go to all appointments, and call your doctor if you are having problems. It's also a good idea to know your test results and keep a list of the medicines you take. How can you care for yourself at home? · Set realistic goals. Many people expect to lose much more weight than is likely. A weight loss of 5% to 10% of your body weight may be enough to improve your health. · Get family and friends involved to provide support. Talk to them about why you are trying to lose weight, and ask them to help. They can help by participating in exercise and having meals with you, even if they may be eating something different. · Find what works best for you. If you do not have time or do not like to cook, a program that offers meal replacement bars or shakes may be better for you. Or if you like to prepare meals, finding a plan that includes daily menus and recipes may be best. 
· Ask your doctor about other health professionals who can help you achieve your weight loss goals. ¨ A dietitian can help you make healthy changes in your diet. ¨ An exercise specialist or  can help you develop a safe and effective exercise program. 
¨ A counselor or psychiatrist can help you cope with issues such as depression, anxiety, or family problems that can make it hard to focus on weight loss. · Consider joining a support group for people who are trying to lose weight. Your doctor can suggest groups in your area. Where can you learn more? Go to Highcon.be Enter O782 in the search box to learn more about \"Starting a Weight Loss Plan: After Your Visit. \"  
© 9693-8892 Healthwise, Incorporated. Care instructions adapted under license by Jovanna Zazueta (which disclaims liability or warranty for this information).  This care instruction is for use with your licensed healthcare professional. If you have questions about a medical condition or this instruction, always ask your healthcare professional. [address] any warranty or liability for your use of this information. Content Version: 9..10652; Last Revised: September 1, 2009 Introducing [facility]! [facility]: 
Thank you for requesting a Clifton account. Our records indicate that you already have an active Clifton account. You can access your account anytime at [email or web address]://[email or web address]. [employer] Did you know that you can access your hospital and ER discharge instructions at any time in Clifton? You can also review all of your test results from your hospital stay or ER visit. Additional Information If you have questions, please visit the Frequently Asked Questions section of the Clifton website at [email or web address] Remember, Clifton is NOT to be used for urgent needs. For medical emergencies, dial 911. Now available from your iPhone and Android! Please provide this summary of care documentation to your next provider. Your primary care clinician is listed as MAGDALENE MILLER. If you have any questions after today's visit, please call [phone].

## 2017-05-03 NOTE — PROGRESS NOTES
[address] [facility] [place]. [place], [address]  Tel.  [phone]  Fax. 100 [facility] [address]  [place], [address] [facility]  Tel.  [phone]  Fax. [phone] [address] [facility] [address]   Tel.  [phone]  Fax. [phone]     S>Michelle Jacobs is a 74 y.o. female seen for a positive airway pressure follow-up. She reports no problems using the device. The following problems are identified:    Drowsiness no Problems exhaling no   Snoring yes Forget to put on no   Mask Comfortable yes Can't fall asleep no   Dry Mouth no Mask falls off no   Air Leaking no Frequent awakenings no         She admits that her sleep has improved with CPAP. Allergies   Allergen Reactions    Latex Other (comments)     \"Trouble breathing\"    Cardizem [Diltiazem Hcl] Unknown (comments)    Ceclor [Cefaclor] Unknown (comments)    Coffee (Coffea Arabica) Other (comments)     Causes swollen neck glands    Also states allergy to tea with same reaction    Flexeril [Cyclobenzaprine] Other (comments)     insomnia    Levaquin [Levofloxacin] Other (comments)     Pt does not want medication since reading insert. - tendon pain    Ludiomil Other (comments)     depression    Macrodantin [Nitrofurantoin Macrocrystalline] Unknown (comments)    Other Medication Other (comments)     Side effects to some BP meds. Unsure of names. Pt will call Dr. Kris Ramirez office and have them fax the information. Information received and entered into chart. Allergic to syntex.  Plendil [Felodipine] Other (comments)     Flushing, chest pain    Prinivil [Lisinopril] Unknown (comments)    Procardia [Nifedipine] Other (comments)     Burning feeling in brain. XL    Provera [Medroxyprogesterone] Other (comments)     Finger pain    Shellfish Derived Unknown (comments)     Allergic to shrimp.     Singulair [Montelukast] Other (comments)     faintness    Tenoretic 100 [Atenolol-Chlorthalidone] Other (comments)     PO - headache    Tenormin [Atenolol] Unknown (comments)    Vistaril [Hydroxyzine Pamoate] Unknown (comments)    Zocor [Simvastatin] Other (comments)     Leg weakness       She has a current medication list which includes the following prescription(s): rivaroxaban, lovastatin, benazepril, catapres-tts-3, verapamil er, brinzolamide-brimonidine, ascorbic acid (vitamin c), cholecalciferol (vitamin d3), omega-3 fatty acids-fish oil, and calcium carbonate. .      She  has a past medical history of Aneurysm ([facility] [address] Arrhythmia; Arthritis; Depression; Diabetes ([facility] Hypertension; Ill-defined condition; Ill-defined condition; Other ill-defined conditions; Other ill-defined conditions; Other ill-defined conditions; Other ill-defined conditions; Other ill-defined conditions; Other ill-defined conditions; Other ill-defined conditions; Other ill-defined conditions (1994); Other ill-defined conditions; Other ill-defined conditions; Other ill-defined conditions; Other ill-defined conditions; Other ill-defined conditions; Psychiatric disorder; Scoliosis; Sleep apnea; and Unspecified sleep apnea. [place] Sleepiness Score: 11   and Modified F.O.S.Q. Score Total / 2: 17.5   which reflect improved sleep quality over therapy time. O>    Visit Vitals    /76    Pulse 92    Ht 5' 1\" (1.549 m)    Wt 197 lb (89.4 kg)    SpO2 97%    BMI 37.22 kg/m2    Neck circ. in \"inches\": 16      General:   Alert, oriented, not in distress   Neck:   No JVD    Chest/Lungs:  symetrical lung expansion , no accessory muscle use    Extremities:  no obvious rashes , negative edema    Neuro:  No focal deficits ; No obvious tremor    Psych:  Normal affect ,  Normal countenance ;           A>    ICD-10-CM ICD-9-CM    1. BERNIE (obstructive sleep apnea) G47.33 327.23 AMB SUPPLY ORDER   2. Obesity (BMI 30-39. 9) E66.9 278.00      AHI = 11 (2014). On CPAP :  12 (Check 9) cmH2O.     Compliant:      yes    Therapeutic Response:  Positive    P>      Orders Placed This Encounter    AMB SUPPLY ORDER     * Device pressure change to CPAP  10-20 cmH2O. Wireless modem enrollment with privileges to change therapy remotely - indefinite. PAP Mask -patient preference, tubing, filters as needed (all sleep supplies)  Length of Need = 99 months    Pastor Jah M.D.  (electronically signed)  Diplomate in Sleep Medicine, [facility]   * PAP card download in 4 weeks. PAP clinic if adherence remains poor  * Follow-up Disposition:  Return in about 1 year (around 5/3/2018), or if symptoms worsen or fail to improve. * She was asked to contact our office for any problems regarding PAP therapy. * Counseling was provided regarding the importance of regular PAP use and on proper sleep hygiene and safe driving. * Re-enforced proper and regular cleaning for the device. Discussed the patient's above normal BMI with her. I have recommended the following interventions: dietary management education, guidance, and counseling . The BMI follow up plan is as follows: BMI is out of normal parameters and plan is as follows: I have counseled this patient on diet and exercise regimens    Thank you for allowing us to participate in your patient's medical care. Pastor Jah M.D.   Diplomate in Sleep Medicine, [facility]

## 2017-05-04 ENCOUNTER — DOCUMENTATION ONLY (OUTPATIENT)
Dept: SLEEP MEDICINE | Age: 67
End: 2017-05-04

## 2017-06-13 ENCOUNTER — TELEPHONE (OUTPATIENT)
Dept: INTERNAL MEDICINE CLINIC | Age: 67
End: 2017-06-13

## 2017-06-13 DIAGNOSIS — R73.01 IFG (IMPAIRED FASTING GLUCOSE): ICD-10-CM

## 2017-06-13 DIAGNOSIS — E78.2 MIXED HYPERLIPIDEMIA: Primary | ICD-10-CM

## 2017-06-13 NOTE — TELEPHONE ENCOUNTER
Spoke with pt - advised she has appt tomorrow 6/14 at 2:20 pm. She states she wanted to have labs done before her appt. Lab slip at .

## 2017-06-14 ENCOUNTER — OFFICE VISIT (OUTPATIENT)
Dept: INTERNAL MEDICINE CLINIC | Age: 67
End: 2017-06-14

## 2017-06-14 ENCOUNTER — HOSPITAL ENCOUNTER (OUTPATIENT)
Dept: LAB | Age: 67
Discharge: HOME | End: 2017-06-14
Payer: MEDICARE

## 2017-06-14 VITALS
OXYGEN SATURATION: 98 % | BODY MASS INDEX: 37.34 KG/M2 | WEIGHT: 197.8 LBS | SYSTOLIC BLOOD PRESSURE: 132 MMHG | HEART RATE: 71 BPM | RESPIRATION RATE: 16 BRPM | DIASTOLIC BLOOD PRESSURE: 84 MMHG | HEIGHT: 61 IN | TEMPERATURE: 97.8 F

## 2017-06-14 DIAGNOSIS — I48.20 CHRONIC ATRIAL FIBRILLATION (HCC): Chronic | ICD-10-CM

## 2017-06-14 DIAGNOSIS — E78.2 MIXED HYPERLIPIDEMIA: ICD-10-CM

## 2017-06-14 DIAGNOSIS — R73.01 IFG (IMPAIRED FASTING GLUCOSE): ICD-10-CM

## 2017-06-14 DIAGNOSIS — F41.9 ANXIETY: Primary | ICD-10-CM

## 2017-06-14 PROCEDURE — 83036 HEMOGLOBIN GLYCOSYLATED A1C: CPT

## 2017-06-14 PROCEDURE — 80061 LIPID PANEL: CPT

## 2017-06-14 PROCEDURE — 80053 COMPREHEN METABOLIC PANEL: CPT

## 2017-06-14 NOTE — MR AVS SNAPSHOT
Visit Information Date & Time Provider Department Dept. Phone Encounter #  
 6/14/2017  2:20 PM Bhavani Lin, [facility] [phone] [id number] Follow-up Instructions Return in about 6 months (around 12/14/2017) for Wellness Visit. Your Appointments 10/4/2017  2:00 PM  
ESTABLISHED PATIENT with Garwin Lefort, MD  
[facility] ([address]) Appt Note: 6 month follow up  
 [address]  
[address] [facility],[address] [address] Upcoming Health Maintenance Date Due  
 MEDICARE YEARLY EXAM 10/7/2016 BREAST CANCER SCRN MAMMOGRAM 3/27/2017 INFLUENZA AGE 9 TO ADULT 8/1/2017 GLAUCOMA SCREENING Q2Y 1/12/2019 COLONOSCOPY 11/30/2022 DTaP/Tdap/Td series (2 - Td) 4/6/2024 Allergies as of 6/14/2017  Review Complete On: 6/14/2017 By: Bhavani Lin MD  
  
 Severity Noted Reaction Type Reactions Latex  11/29/2012    Other (comments) \"Trouble breathing\" Cardizem [Diltiazem Hcl]  11/30/2012    Unknown (comments) Ceclor [Cefaclor]  11/30/2012    Unknown (comments) Coffee (Coffea Arabica)  11/30/2012    Other (comments) Causes swollen neck glands Also states allergy to tea with same reaction Flexeril [Cyclobenzaprine]  11/30/2012    Other (comments)  
 insomnia Levaquin [Levofloxacin]  03/03/2017    Other (comments) Pt does not want medication since reading insert. - tendon pain Ludiomil  11/30/2012    Other (comments) depression Macrodantin [Nitrofurantoin Macrocrystalline]  11/30/2012    Unknown (comments) Other Medication  11/29/2012    Other (comments) Side effects to some BP meds. Unsure of names. Pt will call Dr. Roberta Fox office and have them fax the information. Information received and entered into chart. Allergic to syntex. Plendil [Felodipine]  11/30/2012    Other (comments) Flushing, chest pain Prinivil [Lisinopril]  11/30/2012    Unknown (comments) Procardia [Nifedipine]  11/30/2012    Other (comments) Burning feeling in brain. XL  
 Provera [Medroxyprogesterone]  11/30/2012    Other (comments) Finger pain Shellfish Derived  11/30/2012    Unknown (comments) Allergic to shrimp. Singulair [Montelukast]  11/30/2012    Other (comments)  
 faintness Tenoretic 100 [Atenolol-chlorthalidone]  11/30/2012    Other (comments) PO - headache Tenormin [Atenolol]  11/30/2012    Unknown (comments) Vistaril [Hydroxyzine Pamoate]  11/30/2012    Unknown (comments) Zocor [Simvastatin]  11/30/2012    Other (comments) Leg weakness Current Immunizations  Reviewed on 3/30/2017 Name Date Influenza High Dose Vaccine PF 11/1/2016, 10/22/2015 Influenza Vaccine Whole 9/30/2012 Pneumococcal Conjugate (PCV-13) 11/1/2016 Pneumococcal Polysaccharide (PPSV-23) 5/2/2014  3:19 PM  
 Tdap 4/6/2014 Zoster Vaccine, Live 5/1/2015 Not reviewed this visit You Were Diagnosed With   
  
 Codes Comments Anxiety    -  Primary ICD-10-CM: F41.9 ICD-9-CM: 300.00 IFG (impaired fasting glucose)     ICD-10-CM: R73.01 
ICD-9-CM: 790.21 Mixed hyperlipidemia     ICD-10-CM: E78.2 ICD-9-CM: 272.2 Chronic atrial fibrillation (HCC)     ICD-10-CM: T79.7 ICD-9-CM: 427.31 Vitals BP Pulse Temp Resp Height(growth percentile) Weight(growth percentile) 132/84 71 97.8 °F (36.6 °C) (Oral) 16 5' 1\" (1.549 m) 197 lb 12.8 oz (89.7 kg) SpO2 BMI OB Status Smoking Status 98% 37.37 kg/m2 Postmenopausal Never Smoker Vitals History BMI and BSA Data Body Mass Index Body Surface Area  
 37.37 kg/m 2 1.96 m 2 Preferred Pharmacy Pharmacy Name Phone [address] [facility], [address] [facility] [address] Your Updated Medication List  
  
   
 This list is accurate as of: 6/14/17  3:04 PM.  Always use your most recent med list.  
  
  
  
  
 benazepril 10 mg tablet Commonly known as:  LOTENSIN  
TAKE 1 TABLET BY MOUTH TWICE A DAY  
  
 CATAPRES-TTS-3 0.3 mg/24 hr  
Generic drug:  cloNIDine  
ONE PATCH EVERY 8 DAYS Cholecalciferol (Vitamin D3) 3,000 unit Tab Take 3,000 Units by mouth daily. FISH -1,200 mg Cap Generic drug:  omega-3 fatty acids-fish oil Take 2 Tabs by mouth daily. lovastatin 20 mg tablet Commonly known as:  MEVACOR  
TAKE 1 TABLET BY MOUTH DAILY  
  
 rivaroxaban 20 mg Tab tablet Commonly known as:  Alva Metter Take 1 Tab by mouth daily. SIMBRINZA 1-0.2 % Drps Generic drug:  brinzolamide-brimonidine Apply  to eye two (2) times a day. TUMS PO Take  by mouth as needed. verapamil  mg tablet Commonly known as:  CALAN-SR  
TAKE 1 TABLET BY MOUTH 2 TIMES A DAY  
  
 VITAMIN C 500 mg tablet Generic drug:  ascorbic acid (vitamin C) Take 1,000 mg by mouth nightly. Follow-up Instructions Return in about 6 months (around 12/14/2017) for Wellness Visit. Introducing [facility]! Dear Anya Ugarte: 
Thank you for requesting a [employer] account. Our records indicate that you already have an active [employer] account. You can access your account anytime at [email or web address] Did you know that you can access your hospital and ER discharge instructions at any time in [employer]? You can also review all of your test results from your hospital stay or ER visit. Additional Information If you have questions, please visit the Frequently Asked Questions section of the [employer] website at [email or web address] Remember, [employer] is NOT to be used for urgent needs. For medical emergencies, dial 911. Now available from your iPhone and Android! Please provide this summary of care documentation to your next provider. Your primary care clinician is listed as MAGDALENE MILLER. If you have any questions after today's visit, please call [phone].

## 2017-06-14 NOTE — PROGRESS NOTES
HISTORY OF PRESENT ILLNESS  Rosemary Walton is a 74 y.o. female. [facility] [place] Darshan Fernandez is seen today for evaluation of anxiety and depression along with other troubles. 1. Anxiety with depression. This is longstanding. She has generally not been on been on  medication or they have provided no benefits. Treatment is supportive. There have been some very stressful events at her home. She lives in [address]. She was given a notice of poor housekeeping which apparently upon two citations is grounds for eviction. This has her very very worried. She believes the action was retaliatory from the  with whom she does not relate well. She requests a letter to [facility] to request an inspection of her home to confirm the concerns regarding poor housekeeping. I told her this would be fine. She will let me know exactly where the letter needs to go. 2. IFG, hyperlipidemia. Due for routine lab. 3. Atrial fibrillation. Up to date with specialist follow up. MedDATA/gwo     Current Outpatient Prescriptions   Medication Sig    rivaroxaban (XARELTO) 20 mg tab tablet Take 1 Tab by mouth daily.  lovastatin (MEVACOR) 20 mg tablet TAKE 1 TABLET BY MOUTH DAILY    benazepril (LOTENSIN) 10 mg tablet TAKE 1 TABLET BY MOUTH TWICE A DAY    CATAPRES-TTS-3 0.3 mg/24 hr ONE PATCH EVERY 8 DAYS    verapamil ER (CALAN-SR) 120 mg tablet TAKE 1 TABLET BY MOUTH 2 TIMES A DAY    brinzolamide-brimonidine (SIMBRINZA) 1-0.2 % drps Apply  to eye two (2) times a day.  ascorbic acid (VITAMIN C) 500 mg tablet Take 1,000 mg by mouth nightly.  Cholecalciferol, Vitamin D3, 3,000 unit tab Take 3,000 Units by mouth daily.  omega-3 fatty acids-fish oil (FISH OIL) 360-1,200 mg cap Take 2 Tabs by mouth daily.  CALCIUM CARBONATE (TUMS PO) Take  by mouth as needed. No current facility-administered medications for this visit. Review of Systems   Constitutional: Negative for chills, fever and weight loss.    Respiratory: Negative. Cardiovascular: Negative for chest pain, palpitations, leg swelling and PND. Musculoskeletal: Negative for myalgias. Neurological: Negative for focal weakness. Psychiatric/Behavioral: Positive for depression. The patient is nervous/anxious. Physical Exam   Constitutional: No distress. Psychiatric:   Anxious    Nursing note and vitals reviewed. ASSESSMENT and Lela Berman was seen today for cholesterol problem and blood sugar problem. Diagnoses and all orders for this visit:    Anxiety- see hpi    IFG (impaired fasting glucose)- Follow off treatment     Mixed hyperlipidemia- Check lipid panel and appropriate studies to rule out med side effects now and in 4 months - high risk med management. Chronic atrial fibrillation [facility])- See cardiologist as directed.

## 2017-06-14 NOTE — PROGRESS NOTES
Chief Complaint   Patient presents with    Cholesterol Problem    Blood sugar problem     Goals that were addressed/or need to be completed after this visit:  Health Maintenance Due   Topic Date Due    MEDICARE YEARLY EXAM  10/07/2016    BREAST CANCER SCRN MAMMOGRAM  03/27/2017     · Reminded to return for [address] · Patient states she is scheduled for her mammogram next week.

## 2017-06-15 LAB
ALBUMIN SERPL-MCNC: 3.9 G/DL (ref 3.6–4.8)
ALBUMIN/GLOB SERPL: 1.4 {RATIO} (ref 1.2–2.2)
ALP SERPL-CCNC: 48 IU/L (ref 39–117)
ALT SERPL-CCNC: 20 IU/L (ref 0–32)
AST SERPL-CCNC: 21 IU/L (ref 0–40)
BILIRUB SERPL-MCNC: 0.5 MG/DL (ref 0–1.2)
BUN SERPL-MCNC: 13 MG/DL (ref 8–27)
BUN/CREAT SERPL: 16 (ref 12–28)
CALCIUM SERPL-MCNC: 9.4 MG/DL (ref 8.7–10.3)
CHLORIDE SERPL-SCNC: 102 MMOL/L (ref 96–106)
CHOLEST SERPL-MCNC: 156 MG/DL (ref 100–199)
CO2 SERPL-SCNC: 23 MMOL/L (ref 18–29)
CREAT SERPL-MCNC: 0.81 MG/DL (ref 0.57–1)
EST. AVERAGE GLUCOSE BLD GHB EST-MCNC: 117 MG/DL
GLOBULIN SER CALC-MCNC: 2.7 G/DL (ref 1.5–4.5)
GLUCOSE SERPL-MCNC: 99 MG/DL (ref 65–99)
HBA1C MFR BLD: 5.7 % (ref 4.8–5.6)
HDLC SERPL-MCNC: 59 MG/DL
LDLC SERPL CALC-MCNC: 66 MG/DL (ref 0–99)
POTASSIUM SERPL-SCNC: 4.1 MMOL/L (ref 3.5–5.2)
PROT SERPL-MCNC: 6.6 G/DL (ref 6–8.5)
SODIUM SERPL-SCNC: 142 MMOL/L (ref 134–144)
TRIGL SERPL-MCNC: 156 MG/DL (ref 0–149)
VLDLC SERPL CALC-MCNC: 31 MG/DL (ref 5–40)

## 2017-06-22 ENCOUNTER — TELEPHONE (OUTPATIENT)
Dept: INTERNAL MEDICINE CLINIC | Age: 67
End: 2017-06-22

## 2017-06-22 NOTE — TELEPHONE ENCOUNTER
Spoke with pt - states she discussed at her last appt with Dr Sonya Sotelo about a letter to [address] [place] [address]. She would like to have it by 6/27/17. Advised her MD out of office until 6/26/17.  Will forward to MD.

## 2017-06-22 NOTE — TELEPHONE ENCOUNTER
Called pt and gave her MD awan note from 6/17/17-[facility] look great overall . Continue current regimen of prescription and / or OTC medications and Let us know if you have any questions. Osmel Membreno. Pt states she needs to speak to [place] regarding something else , I asked her if it was something I could help with she would not say what it was regarding just states she prefers to speak to Donte Rhoades about it. informed her I will give christiano the msg.

## 2017-06-22 NOTE — TELEPHONE ENCOUNTER
Courtney Calle, patient called you back -- she was down the banks getting her mail. Asked if you would call her before you leave today.

## 2017-06-26 ENCOUNTER — TELEPHONE (OUTPATIENT)
Dept: INTERNAL MEDICINE CLINIC | Age: 67
End: 2017-06-26

## 2017-06-26 NOTE — TELEPHONE ENCOUNTER
Spoke with pt - states management is coming to re-inspect her apartment on Wednesday 6/28/17 due to poor housekeeping report. Pt requesting letter be faxed tomorrow - ATTN: Otilia Reis with [facility] [phone]. Also mail letter.

## 2017-06-26 NOTE — TELEPHONE ENCOUNTER
Spoke with pt - she is requesting to send letter to  [facility] with [address]  [address]  She also wants a copy mailed to her.  Will forward to MD.

## 2017-06-27 ENCOUNTER — TELEPHONE (OUTPATIENT)
Dept: INTERNAL MEDICINE CLINIC | Age: 67
End: 2017-06-27

## 2017-06-27 NOTE — TELEPHONE ENCOUNTER
Advised pt as she requested faxed letter - ATTN: William Vera with [employer] to [phone]. confirmation received. Also mail letter to pt for her records and to William Vera with [employer].

## 2017-07-10 RX ORDER — LOVASTATIN 20 MG/1
TABLET ORAL
Qty: 30 TAB | Refills: 11 | OUTPATIENT
Start: 2017-07-10

## 2017-08-01 ENCOUNTER — DOCUMENTATION ONLY (OUTPATIENT)
Dept: INTERNAL MEDICINE CLINIC | Age: 67
End: 2017-08-01

## 2017-08-01 NOTE — PROGRESS NOTES
Spoke with pt back on 6/26/17 - she had stated management is coming to re-inspect her apartment on 6/28/17 due to poor housekeeping report. She requested a letter be faxed to the ATTN: Marjan Guerrero with [facility] [phone]. Also mail letter - pt gave me address she had. Today letter was return to sender. Letter to scan.

## 2017-10-19 RX ORDER — CLONIDINE 0.3 MG/D
PATCH TRANSDERMAL
Qty: 4 PATCH | Refills: 3 | Status: SHIPPED | OUTPATIENT
Start: 2017-10-19 | End: 2018-03-04 | Stop reason: SDUPTHER

## 2017-11-20 RX ORDER — BENAZEPRIL HYDROCHLORIDE 10 MG/1
TABLET ORAL
Qty: 60 TAB | Refills: 1 | Status: SHIPPED | OUTPATIENT
Start: 2017-11-20 | End: 2018-07-26 | Stop reason: SDUPTHER

## 2017-11-30 ENCOUNTER — HOSPITAL ENCOUNTER (OUTPATIENT)
Dept: MAMMOGRAPHY | Age: 67
Discharge: HOME | End: 2017-11-30
Attending: INTERNAL MEDICINE
Payer: MEDICARE

## 2017-11-30 DIAGNOSIS — Z12.39 BREAST SCREENING: ICD-10-CM

## 2017-11-30 PROCEDURE — 77063 BREAST TOMOSYNTHESIS BI: CPT

## 2017-12-12 ENCOUNTER — TELEPHONE (OUTPATIENT)
Dept: INTERNAL MEDICINE CLINIC | Age: 67
End: 2017-12-12

## 2017-12-12 DIAGNOSIS — R73.01 IFG (IMPAIRED FASTING GLUCOSE): ICD-10-CM

## 2017-12-12 DIAGNOSIS — Z79.899 ENCOUNTER FOR LONG-TERM (CURRENT) USE OF MEDICATIONS: ICD-10-CM

## 2017-12-12 DIAGNOSIS — E78.2 MIXED HYPERLIPIDEMIA: Primary | ICD-10-CM

## 2017-12-12 NOTE — TELEPHONE ENCOUNTER
Please put a lab slip at the fd and also needs proof that her cat is a service animal please fill out form or write letter and leave at the fd

## 2017-12-14 ENCOUNTER — TELEPHONE (OUTPATIENT)
Dept: INTERNAL MEDICINE CLINIC | Age: 67
End: 2017-12-14

## 2017-12-14 NOTE — TELEPHONE ENCOUNTER
Pt canceled her appt today due to her brother had a heart attack. Can she can get a flu shot with a cough?

## 2017-12-15 ENCOUNTER — TELEPHONE (OUTPATIENT)
Dept: INTERNAL MEDICINE CLINIC | Age: 67
End: 2017-12-15

## 2017-12-15 NOTE — TELEPHONE ENCOUNTER
Attempted to call pt - phone rang continuously - no vm to leave message. Will continue to try and reach pt.

## 2017-12-15 NOTE — TELEPHONE ENCOUNTER
Spoke with pt - wanted to let MD know she was sorry she had to cancel appt yesterday but her brother had a heart attack the day before. He is doing fine - was \"mild one\" but she was just worried about him. She has rescheduled for 12/19/17 and is requesting for MD to order hip and knee x-rays prior to her appt at 3:25 pm. She has been experiencing pain in both areas to point has difficulty walking at times. Advised her MD will want to see her for further evaluation prior to ordering x-rays. She states she has transportation issues and worried that her appt is late in day can not get done if he does order them. She states she could come in earlier would help. Rescheduled her at 11:30 am same day. Will forward to MD.    1 [facility] came to her assisted living yesterday to give flu vaccines - but she could not get the one they had due to latex allergy. He is coming today with vaccine with no preservatives.

## 2017-12-19 ENCOUNTER — OFFICE VISIT (OUTPATIENT)
Dept: INTERNAL MEDICINE CLINIC | Age: 67
End: 2017-12-19

## 2017-12-19 ENCOUNTER — HOSPITAL ENCOUNTER (OUTPATIENT)
Dept: LAB | Age: 67
Discharge: HOME | End: 2017-12-19
Payer: MEDICARE

## 2017-12-19 ENCOUNTER — HOME HEALTH ADMISSION (OUTPATIENT)
Dept: HOME HEALTH SERVICES | Facility: HOME HEALTH | Age: 67
End: 2017-12-19
Payer: MEDICARE

## 2017-12-19 ENCOUNTER — HOSPITAL ENCOUNTER (OUTPATIENT)
Dept: GENERAL RADIOLOGY | Age: 67
Discharge: HOME | End: 2017-12-19
Payer: MEDICARE

## 2017-12-19 VITALS
RESPIRATION RATE: 18 BRPM | HEIGHT: 61 IN | SYSTOLIC BLOOD PRESSURE: 125 MMHG | OXYGEN SATURATION: 95 % | BODY MASS INDEX: 37.95 KG/M2 | HEART RATE: 75 BPM | TEMPERATURE: 98.2 F | WEIGHT: 201 LBS | DIASTOLIC BLOOD PRESSURE: 75 MMHG

## 2017-12-19 DIAGNOSIS — M25.562 PAIN IN BOTH KNEES, UNSPECIFIED CHRONICITY: ICD-10-CM

## 2017-12-19 DIAGNOSIS — M25.561 PAIN IN BOTH KNEES, UNSPECIFIED CHRONICITY: ICD-10-CM

## 2017-12-19 DIAGNOSIS — M25.551 PAIN OF BOTH HIP JOINTS: ICD-10-CM

## 2017-12-19 DIAGNOSIS — M25.552 PAIN OF BOTH HIP JOINTS: ICD-10-CM

## 2017-12-19 DIAGNOSIS — M41.25 OTHER IDIOPATHIC SCOLIOSIS, THORACOLUMBAR REGION: ICD-10-CM

## 2017-12-19 DIAGNOSIS — Z00.00 MEDICARE ANNUAL WELLNESS VISIT, SUBSEQUENT: Primary | ICD-10-CM

## 2017-12-19 DIAGNOSIS — Z71.89 ADVANCED CARE PLANNING/COUNSELING DISCUSSION: ICD-10-CM

## 2017-12-19 DIAGNOSIS — I48.20 CHRONIC ATRIAL FIBRILLATION (HCC): Chronic | ICD-10-CM

## 2017-12-19 DIAGNOSIS — Z13.39 SCREENING FOR ALCOHOLISM: ICD-10-CM

## 2017-12-19 DIAGNOSIS — M25.559 HIP PAIN: ICD-10-CM

## 2017-12-19 DIAGNOSIS — Z12.11 SCREEN FOR COLON CANCER: ICD-10-CM

## 2017-12-19 DIAGNOSIS — R73.01 IFG (IMPAIRED FASTING GLUCOSE): ICD-10-CM

## 2017-12-19 DIAGNOSIS — E78.2 MIXED HYPERLIPIDEMIA: ICD-10-CM

## 2017-12-19 DIAGNOSIS — F33.2 SEVERE EPISODE OF RECURRENT MAJOR DEPRESSIVE DISORDER, WITHOUT PSYCHOTIC FEATURES (HCC): ICD-10-CM

## 2017-12-19 PROCEDURE — 80061 LIPID PANEL: CPT

## 2017-12-19 PROCEDURE — 73560 X-RAY EXAM OF KNEE 1 OR 2: CPT

## 2017-12-19 PROCEDURE — 80076 HEPATIC FUNCTION PANEL: CPT

## 2017-12-19 PROCEDURE — 84443 ASSAY THYROID STIM HORMONE: CPT

## 2017-12-19 PROCEDURE — 81003 URINALYSIS AUTO W/O SCOPE: CPT

## 2017-12-19 PROCEDURE — 83036 HEMOGLOBIN GLYCOSYLATED A1C: CPT

## 2017-12-19 PROCEDURE — 80048 BASIC METABOLIC PNL TOTAL CA: CPT

## 2017-12-19 PROCEDURE — 85025 COMPLETE CBC W/AUTO DIFF WBC: CPT

## 2017-12-19 PROCEDURE — 81015 MICROSCOPIC EXAM OF URINE: CPT

## 2017-12-19 PROCEDURE — 73521 X-RAY EXAM HIPS BI 2 VIEWS: CPT

## 2017-12-19 NOTE — PROGRESS NOTES
Called [facility] - spoke with Daphne - pt has been opened to PT. Pt is aware.

## 2017-12-19 NOTE — PROGRESS NOTES
Joshua Cunningham is a 69 y.o. female and presents for Annual Medicare Wellness Visit. Assessment of cognitive impairment: Alert and oriented x 3. Abuse Screen:    Abuse Screening Questionnaire 12/19/2017   Do you ever feel afraid of your partner? N   Are you in a relationship with someone who physically or mentally threatens you? N   Is it safe for you to go home? Y       Depression Screen:   PHQ over the last two weeks 4/6/2015   Little interest or pleasure in doing things Several days   Feeling down, depressed or hopeless Not at all   Total Score PHQ 2 1       Fall Risk Assessment:    Fall Risk Assessment, last 12 mths 12/19/2017   Able to walk? Yes   Fall in past 12 months? No       Activities of Daily Living:    ADL Assessment 12/19/2017   Feeding yourself No Help Needed   Getting from bed to chair No Help Needed   Getting dressed No Help Needed   Bathing or showering No Help Needed   Walk across the room (includes cane/walker) No Help Needed   Using the telphone No Help Needed   Taking your medications No Help Needed   Preparing meals Help Needed   Managing money (expenses/bills) No Help Needed   Moderately strenuous housework (laundry) No Help Needed   Shopping for personal items (toiletries/medicines) Help Needed   Shopping for groceries Help Needed   Driving Help Needed   Climbing a flight of stairs Help Needed   Getting to places beyond walking distances Help Needed       Health Maintenance:  Daily Low Dose Aspirin: no, on Xarelto  Bone Density: patient declined  Glaucoma Screening: yes 4/18/17 with Dr. Josue Welch   Immunizations:    Tetanus: up to date 4/6/14. Influenza: received last week. Shingles:  up to date 5/1/15. Pneumovax:  up to date 5/2/14. Prevnar: up to date 11/1/16. Cancer screening:    Cervical: NA.  Breast: up to date 11/30/17. Colon: not up to date - 11/30/12 q 5 years, referral placed.   Prostate:  NA    Advance Care Planning:   End of Life Planning: has an advanced directive - a copy has been provided. Provided pt with \"Respecting Choices packet of Information\" no  Offered facilitator session with NN no     Medications/Allergies: Reviewed with patient  Prior to Admission medications    Medication Sig Start Date End Date Taking? Authorizing Provider   benazepril (LOTENSIN) 10 mg tablet TAKE 1 TABLET BY MOUTH TWICE A DAY 11/20/17   Otilia Thorpe MD   CATAPRES-TTS-3 0.3 mg/24 hr APPLY 1 PATCH EVERY 8 DAYS AS DIRECTED 10/19/17   Otilia Thorpe MD   rivaroxaban (XARELTO) 20 mg tab tablet Take 1 Tab by mouth daily. 3/17/17   Nicolás Hernadez MD   lovastatin (MEVACOR) 20 mg tablet TAKE 1 TABLET BY MOUTH DAILY 3/16/17   Otilia Thorpe MD   verapamil ER (CALAN-SR) 120 mg tablet TAKE 1 TABLET BY MOUTH 2 TIMES A DAY 7/6/16   Otilia Thorpe MD   brinzolamide-brimonidine ACUITY [facility]) 1-0.2 % drps Apply  to eye two (2) times a day. Historical Provider   ascorbic acid (VITAMIN C) 500 mg tablet Take 1,000 mg by mouth nightly. Historical Provider   Cholecalciferol, Vitamin D3, 3,000 unit tab Take 3,000 Units by mouth daily. Historical Provider   omega-3 fatty acids-fish oil (FISH OIL) 360-1,200 mg cap Take 2 Tabs by mouth daily. Historical Provider   CALCIUM CARBONATE (TUMS PO) Take  by mouth as needed. Historical Provider     Allergies   Allergen Reactions    Latex Other (comments)     \"Trouble breathing\"    Cardizem [Diltiazem Hcl] Unknown (comments)    Ceclor [Cefaclor] Unknown (comments)    Coffee (Coffea Arabica) Other (comments)     Causes swollen neck glands    Also states allergy to tea with same reaction    Flexeril [Cyclobenzaprine] Other (comments)     insomnia    Levaquin [Levofloxacin] Other (comments)     Pt does not want medication since reading insert. - tendon pain    Ludiomil Other (comments)     depression    Macrodantin [Nitrofurantoin Macrocrystalline] Unknown (comments)    Other Medication Other (comments)     Side effects to some BP meds. Unsure of names. Pt will call Dr. Xavier Ceballos office and have them fax the information. Information received and entered into chart. Allergic to syntex.  Plendil [Felodipine] Other (comments)     Flushing, chest pain    Prinivil [Lisinopril] Unknown (comments)    Procardia [Nifedipine] Other (comments)     Burning feeling in brain. XL    Provera [Medroxyprogesterone] Other (comments)     Finger pain    Shellfish Derived Unknown (comments)     Allergic to shrimp.  Singulair [Montelukast] Other (comments)     faintness    Tenoretic 100 [Atenolol-Chlorthalidone] Other (comments)     PO - headache    Tenormin [Atenolol] Unknown (comments)    Vistaril [Hydroxyzine Pamoate] Unknown (comments)    Zocor [Simvastatin] Other (comments)     Leg weakness       PSH: Reviewed with patient  Past Surgical History:   Procedure Laterality Date    CARDIAC SURG PROCEDURE UNLIST      cardiac cath normal per pt, [facility] approx 1994    HX BREAST BIOPSY Right 04/12/2012    (Neg) right breast biopsy    HX GYN      endometrial ablation x 2    HX OTHER SURGICAL      colonoscopy x 1    HX TONSILLECTOMY      T & A        SH: Reviewed with patient  Social History   Substance Use Topics    Smoking status: Never Smoker    Smokeless tobacco: Never Used    Alcohol use No       FH: Reviewed with patient  Family History   Problem Relation Age of Onset    Diabetes Mother     Asthma Mother     Lung Disease Mother     Glaucoma Mother     Cancer Father     Heart Disease Father     Lung Disease Father     Psychiatric Disorder Father     Stroke Father     Other Sister      arthritis         Objective:  Visit Vitals    /75  Comment: home average    Pulse 75    Temp 98.2 °F (36.8 °C) (Oral)    Resp 18    Ht 5' 1\" (1.549 m)    Wt 201 lb (91.2 kg)    SpO2 95%    BMI 37.98 kg/m2    Body mass index is 37.98 kg/(m^2).       Alcohol Risk Screen:   On any occasion during past 3 months, have you had more than 3 drinks (female) or 4 drinks (male) containing alcohol? No  Do you average more than 7 drinks (female) or 14 drinks (male) per week? No  Type and Amount: N/A    Tobacco Abuse:  No    Nutrition Screen:  Meals on wheels delivered daily    Hearing Loss:  Mild loss    Vision Loss:   Wears glasses, contact lenses, or have any other visual impairment  Wears glasses    Activities of Daily Living:  Self-care. Requires assistance with:   Patient handle his/her own medications  yes     Current medical providers:    Patient Care Team:  Nicole Verdugo MD as PCP - General (Internal Medicine)  Derald Riedel, MD (Cardiology)  Bunny Acosta MD as Physician (Sleep Medicine)  Nicol Hancock RN as Nurse Navigator (Internal Medicine)  Enrique Galvan MD as Physician (Ophthalmology)  Bunny Acosta MD as Physician (Sleep Medicine)      Plan:      Orders Placed This Encounter    XR HIP LT W OR WO PELV 2-3 VWS    XR HIP RT W OR WO PELV 2-3 VWS    XR KNEE LT MAX 2 VWS    XR KNEE RT MAX 2 VWS    REFERRAL TO PHYSICAL THERAPY    REFERRAL TO HOME HEALTH    REFERRAL TO [address] [place] [address]   Topic Date Due    Influenza Age 5 to Adult  08/01/2017    COLONOSCOPY  11/30/2017    MEDICARE YEARLY EXAM  12/20/2018    GLAUCOMA SCREENING Q2Y  01/12/2019    DTaP/Tdap/Td series (2 - Td) 04/06/2024    Hepatitis C Screening  Completed    OSTEOPOROSIS SCREENING (DEXA)  Addressed    ZOSTER VACCINE AGE 60>  Completed    Pneumococcal 65+ Low/Medium Risk  Completed       *Patient verbalized understanding and agreement with the plan. A copy of the After Visit Summary with personalized health plan was given to the patient today. Physical Exam will be performed by PCP and documented under a separate Progress Note.

## 2017-12-19 NOTE — Clinical Note
HISTORY OF PRESENT ILLNESS Hamilton Hendricks is a 69 y.o. female. RIAN Panfilo Salehw is seen today for a Medicare Wellness Visit and follow up of chronic problems. Preventive medicine. Fully reviewed today. She is due for a complete physical examination and routine screening laboratory studies. Also, due for colonoscopy. For Medicare Wellness Visit, please see attached RN note for Medicare Wellness Visit. This was fully reviewed and discussed with Corinna. Chronic medical problems are reviewed. Chronic atrial fibrillation. Up to date with specialist follow up. Hypertension. Increased readings initially, home readings average 125/75. Depression, severe but stable. IFG, hyperlipidemia. Due for routine labs. Review of systems notable for back, hip and knee pain. She is having decreased ability to ambulate. She does have severe scoliosis. We will obtain x-rays. She agrees to physical therapy at home. She is homebound. Social history notable for her needing a letter stating her cat is a service animal. She is going to use this to get pet insurance. She is homebound. This represents a face-to-face visit for physical therapy at home. Family history notable for her brother suffering a small myocardial infarction. This was an extended visit of high complexity. MedDATA/gwo Review of Systems Constitutional: Negative for weight loss. Respiratory: Negative. Cardiovascular: Negative for chest pain, palpitations, leg swelling and PND. Musculoskeletal: Positive for back pain and joint pain. Negative for myalgias. Neurological: Negative for focal weakness. Endo/Heme/Allergies: Does not bruise/bleed easily. Psychiatric/Behavioral: Positive for depression. All other systems reviewed and are negative. Physical Exam  
Constitutional: She appears well-nourished. Neck: Carotid bruit is not present. Cardiovascular: Normal rate. An irregularly irregular rhythm present. Exam reveals no gallop and no friction rub. No murmur heard. Pulmonary/Chest: Effort normal and breath sounds normal. No respiratory distress. Musculoskeletal: She exhibits edema. Trace stasis edema bilateral lower extremities. Neurological: She is alert. Shuffling gait with wheel chair assistance Psychiatric:  
Depressed affect Nursing note and vitals reviewed. ASSESSMENT and PLAN Diagnoses and all orders for this visit: 
 
1. Mixed hyperlipidemia- Check lipid panel and appropriate studies to rule out med side effects now and in 6 months- high risk med management. 2. Medicare annual wellness visit, subsequent 3. Advanced care planning/counseling discussion 4. Screening for alcoholism 5. Pain in both knees, unspecified chronicity -     XR KNEE LT MAX 2 VWS; Future -     XR KNEE RT MAX 2 VWS; Future 
-     REFERRAL TO PHYSICAL THERAPY 
-     REFERRAL TO HOME HEALTH 6. Pain of both hip joints 
-     XR HIP LT W OR WO PELV 2-3 VWS; Future -     XR HIP RT W OR WO PELV 2-3 VWS; Future 
-     REFERRAL TO PHYSICAL THERAPY 
-     REFERRAL TO HOME HEALTH 7. Chronic atrial fibrillation ([address]- Continue current regimen of prescription and / or OTC medications , See cardiologist as directed. 8. IFG (impaired fasting glucose)- Follow off treatment 9. Severe episode of recurrent major depressive disorder, without psychotic features ([address]- Follow off treatment 10. Other idiopathic scoliosis, thoracolumbar region 
-     REFERRAL TO PHYSICAL THERAPY 
-     REFERRAL TO [address]. Screen for colon cancer 
-     REFERRAL TO COLON AND RECTAL SURGERY

## 2017-12-19 NOTE — PROGRESS NOTES
[facility] is seen today for a Medicare Wellness Visit and follow up of chronic problems. Preventive medicine. Fully reviewed today. She is due for a complete physical examination and routine screening laboratory studies. Also, due for colonoscopy. For Medicare Wellness Visit, please see attached RN note for Medicare Wellness Visit. This was fully reviewed and discussed with Corinna. Chronic medical problems are reviewed. Chronic atrial fibrillation. Up to date with specialist follow up. Hypertension. Increased readings initially, home readings average 125/75. Depression, severe but stable. IFG, hyperlipidemia. Due for routine labs. Review of systems notable for back, hip and knee pain. She is having decreased ability to ambulate. She does have severe scoliosis. We will obtain x-rays. She agrees to physical therapy at home. She is homebound. Social history notable for her needing a letter stating her cat is a service animal. She is going to use this to get pet insurance. She is homebound. This represents a face-to-face visit for physical therapy at home. Family history notable for her brother suffering a small myocardial infarction. This was an extended visit of high complexity.      Carlos/yary

## 2017-12-19 NOTE — MR AVS SNAPSHOT
Visit Information Date & Time Provider Department Dept. Phone Encounter #  
 12/19/2017 11:30 AM Keke Celis, [facility] Internal Medicine [phone] [id number] Follow-up Instructions Return in about 6 months (around 6/19/2018). Your Appointments 12/27/2017  3:00 PM  
ESTABLISHED PATIENT with Shawnee Wade MD  
[facility] ([address] [facility]) Appt Note: 6 month follow up; LVM for pt to r.s 10/4 appt 9/22 [facility]; 6 month follow up pt r/s from 10/4 to 12/14; 6 month follow up pt r/s from 10/4 to 12/14 pt r/s from 12/14 to 12/27  
 [address] [facility] [address]  
[address] [address]  
  
    
 12/27/2017  4:30 PM  
Any with Nitza Zuniga MD  
[address] [facility] ([facility]) Appt Note: Follow-up, Dr. Ayad Hemphill patient, needs order for CPAP Assistance [facility] [address] [facility] [address]  
  
   
 [address] [address] Upcoming Health Maintenance Date Due Influenza Age 5 to Adult 8/1/2017 COLONOSCOPY 11/30/2017 MEDICARE YEARLY EXAM 12/20/2018 GLAUCOMA SCREENING Q2Y 1/12/2019 DTaP/Tdap/Td series (2 - Td) 4/6/2024 Allergies as of 12/19/2017  Review Complete On: 12/19/2017 By: Keke Celis MD  
  
 Severity Noted Reaction Type Reactions Latex  11/29/2012    Other (comments) \"Trouble breathing\" Cardizem [Diltiazem Hcl]  11/30/2012    Unknown (comments) Ceclor [Cefaclor]  11/30/2012    Unknown (comments) Coffee (Coffea Arabica)  11/30/2012    Other (comments) Causes swollen neck glands Also states allergy to tea with same reaction Flexeril [Cyclobenzaprine]  11/30/2012    Other (comments)  
 insomnia Levaquin [Levofloxacin]  03/03/2017    Other (comments) Pt does not want medication since reading insert. - tendon pain Ludiomil  11/30/2012    Other (comments) depression Macrodantin [Nitrofurantoin Macrocrystalline]  11/30/2012    Unknown (comments) Other Medication  11/29/2012    Other (comments) Side effects to some BP meds. Unsure of names. Pt will call Dr. Carlito Lopez office and have them fax the information. Information received and entered into chart. Allergic to syntex. Plendil [Felodipine]  11/30/2012    Other (comments) Flushing, chest pain Prinivil [Lisinopril]  11/30/2012    Unknown (comments) Procardia [Nifedipine]  11/30/2012    Other (comments) Burning feeling in brain. XL  
 Provera [Medroxyprogesterone]  11/30/2012    Other (comments) Finger pain Shellfish Derived  11/30/2012    Unknown (comments) Allergic to shrimp. Singulair [Montelukast]  11/30/2012    Other (comments)  
 faintness Tenoretic 100 [Atenolol-chlorthalidone]  11/30/2012    Other (comments) PO - headache Tenormin [Atenolol]  11/30/2012    Unknown (comments) Vistaril [Hydroxyzine Pamoate]  11/30/2012    Unknown (comments) Zocor [Simvastatin]  11/30/2012    Other (comments) Leg weakness Current Immunizations  Reviewed on 3/30/2017 Name Date Influenza High Dose Vaccine PF 11/1/2016, 10/22/2015 Influenza Vaccine Whole 9/30/2012 Pneumococcal Conjugate (PCV-13) 11/1/2016 Pneumococcal Polysaccharide (PPSV-23) 5/2/2014  3:19 PM  
 Tdap 4/6/2014 Zoster Vaccine, Live 5/1/2015 Not reviewed this visit You Were Diagnosed With   
  
 Codes Comments Mixed hyperlipidemia    -  Primary ICD-10-CM: C15.8 ICD-9-CM: 272.2 Medicare annual wellness visit, subsequent     ICD-10-CM: Z00.00 ICD-9-CM: V70.0 Advanced care planning/counseling discussion     ICD-10-CM: Z71.89 ICD-9-CM: V65.49 Screening for alcoholism     ICD-10-CM: Z13.89 ICD-9-CM: V79.1 Pain in both knees, unspecified chronicity     ICD-10-CM: M25.561, M25.562 ICD-9-CM: 719.46   
 Pain of both hip joints     ICD-10-CM: M25.551, M25.552 ICD-9-CM: 719.45 Chronic atrial fibrillation (HCC)     ICD-10-CM: Z80.0 ICD-9-CM: 427.31   
 IFG (impaired fasting glucose)     ICD-10-CM: R73.01 
ICD-9-CM: 790.21 Severe episode of recurrent major depressive disorder, without psychotic features ([facility]     ICD-10-CM: F33.2 ICD-9-CM: 296.33 Other idiopathic scoliosis, thoracolumbar region     ICD-10-CM: M41.25 ICD-9-CM: 737.30 Vitals BP Pulse Temp Resp Height(growth percentile) Weight(growth percentile) 125/75 75 98.2 °F (36.8 °C) (Oral) 18 5' 1\" (1.549 m) 201 lb (91.2 kg) SpO2 BMI OB Status Smoking Status 95% 37.98 kg/m2 Postmenopausal Never Smoker Vitals History BMI and BSA Data Body Mass Index Body Surface Area  
 37.98 kg/m 2 1.98 m 2 Preferred Pharmacy Pharmacy Name Phone [id number] Raphael Morrissey, [address] [place], [place] [phone] Your Updated Medication List  
  
   
This list is accurate as of: 12/19/17 12:03 PM.  Always use your most recent med list.  
  
  
  
  
 benazepril 10 mg tablet Commonly known as:  LOTENSIN  
TAKE 1 TABLET BY MOUTH TWICE A DAY  
  
 CATAPRES-TTS-3 0.3 mg/24 hr  
Generic drug:  cloNIDine APPLY 1 PATCH EVERY 8 DAYS AS DIRECTED Cholecalciferol (Vitamin D3) 3,000 unit Tab Take 3,000 Units by mouth daily. FISH -1,200 mg Cap Generic drug:  omega-3 fatty acids-fish oil Take 2 Tabs by mouth daily. lovastatin 20 mg tablet Commonly known as:  MEVACOR  
TAKE 1 TABLET BY MOUTH DAILY  
  
 rivaroxaban 20 mg Tab tablet Commonly known as:  Jeralyn Antonio Take 1 Tab by mouth daily. SIMBRINZA 1-0.2 % Drps Generic drug:  brinzolamide-brimonidine Apply  to eye two (2) times a day. TUMS PO Take  by mouth as needed. verapamil  mg tablet Commonly known as:  CALAN-SR  
TAKE 1 TABLET BY MOUTH 2 TIMES A DAY  
  
 VITAMIN C 500 mg tablet Generic drug:  ascorbic acid (vitamin C) Take 1,000 mg by mouth nightly. We Performed the Following [address] REFERRAL TO PHYSICAL THERAPY [BOK63 Custom] Follow-up Instructions Return in about 6 months (around 6/19/2018). To-Do List   
 12/19/2017 Imaging:  XR HIP LT W OR WO PELV 2-3 VWS   
  
 12/19/2017 Imaging:  XR HIP RT W OR WO PELV 2-3 VWS   
  
 12/19/2017 Imaging:  XR KNEE LT MAX 2 VWS   
  
 12/19/2017 Imaging:  XR KNEE RT MAX 2 VWS Referral Information Referral ID Referred By Referred To  
  
 [id number] Rashard IMLLER C Not Available Visits Status Start Date End Date 1 New Request 12/19/17 12/19/18 If your referral has a status of pending review or denied, additional information will be sent to support the outcome of this decision. Referral ID Referred By Referred To  
 [id number] Rashard MILLER Layer C Not Available Visits Status Start Date End Date 1 New Request 12/19/17 12/19/18 If your referral has a status of pending review or denied, additional information will be sent to support the outcome of this decision. Patient Instructions Today you had a Medicare Wellness Visit. During this visit, we developed and/or updated your personalized health plan to prevent disease and disability based on your current health and risk factors. Please schedule an appt around this time next year so we can continue to keep you on the right path to living a healthy lifestyle. Schedule of Personalized Health Plan The best way to stay healthy is to live a healthy lifestyle. A healthy lifestyle includes regular exercise, eating a well-balanced diet, keeping a healthy weight and not smoking. Regular physical exams and screening tests are another important way to take care of yourself.   Preventive exams provided by health care providers can find health problems early when treatment works best and can keep you from getting certain diseases or illnesses. Preventive services include exams, lab tests, screenings, shots, monitoring and information to help you take care of your own health. All people over 65 should have a pneumonia shot. Pneumonia shots are usually only needed once in a lifetime unless your doctor decides differently. All people over 65 should have a yearly flu shot. People over 65 are at medium to high risk for Hepatitis B. Three shots are needed for complete protection. For additional information, please discuss with physician. In addition to your physical exam, some screening tests are recommended: 
 
Bone mass measurement (dexa scan) is recommended every  two years. Diabetes Mellitus screening is recommended every year. Glaucoma is an eye disease caused by high pressure in the eye. An eye exam is recommended every year. Cardiovascular screening tests that check your cholesterol and other blood fat (lipid) levels are recommended every five years. Colorectal Cancer screening tests help to find pre-cancerous polyps (growths in the colon) so they can be removed before they turn into cancer. Tests ordered for screening depend on your personal and family history risk factors. Mammogram screening for Breast Cancer is recommended yearly. Screening for Cervical Cancer is recommended every two years (annually for certain risk factors, such as previous history of STD or abnormal PAP in past 7 years). Here is a list of your current Health Maintenance items with a due date: 
Health Maintenance Topic Date Due  Influenza Age 5 to Adult  08/01/2017  COLONOSCOPY  11/30/2017  MEDICARE YEARLY EXAM  12/20/2018  GLAUCOMA SCREENING Q2Y  01/12/2019  
 DTaP/Tdap/Td series (2 - Td) 04/06/2024  Hepatitis C Screening  Completed  OSTEOPOROSIS SCREENING (DEXA)  Addressed  ZOSTER VACCINE AGE 60>  Completed  Pneumococcal 65+ Low/Medium Risk  Completed [facility]! Dear Mile Champagne: 
Thank you for requesting a [employer] account. Our records indicate that you already have an active [employer] account. You can access your account anytime at [email or web address]. [employer] Did you know that you can access your hospital and ER discharge instructions at any time in [employer]? You can also review all of your test results from your hospital stay or ER visit. Additional Information If you have questions, please visit the Frequently Asked Questions section of the [employer] website at [email or web address] Remember, [employer] is NOT to be used for urgent needs. For medical emergencies, dial 911. Now available from your iPhone and Android! Please provide this summary of care documentation to your next provider. Your primary care clinician is listed as MAGDALENE MILLER. If you have any questions after today's visit, please call [phone].

## 2017-12-19 NOTE — PATIENT INSTRUCTIONS
Today you had a Medicare Wellness Visit. During this visit, we developed and/or updated your personalized health plan to prevent disease and disability based on your current health and risk factors. Please schedule an appt around this time next year so we can continue to keep you on the right path to living a healthy lifestyle. Schedule of Personalized Health Plan    The best way to stay healthy is to live a healthy lifestyle. A healthy lifestyle includes regular exercise, eating a well-balanced diet, keeping a healthy weight and not smoking. Regular physical exams and screening tests are another important way to take care of yourself. Preventive exams provided by health care providers can find health problems early when treatment works best and can keep you from getting certain diseases or illnesses. Preventive services include exams, lab tests, screenings, shots, monitoring and information to help you take care of your own health. All people over 65 should have a pneumonia shot. Pneumonia shots are usually only needed once in a lifetime unless your doctor decides differently. All people over 65 should have a yearly flu shot. People over 65 are at medium to high risk for Hepatitis B. Three shots are needed for complete protection. For additional information, please discuss with physician. In addition to your physical exam, some screening tests are recommended:    Bone mass measurement (dexa scan) is recommended every  two years. Diabetes Mellitus screening is recommended every year. Glaucoma is an eye disease caused by high pressure in the eye. An eye exam is recommended every year. Cardiovascular screening tests that check your cholesterol and other blood fat (lipid) levels are recommended every five years. Colorectal Cancer screening tests help to find pre-cancerous polyps (growths in the colon) so they can be removed before they turn into cancer.   Tests ordered for screening depend on your personal and family history risk factors. Mammogram screening for Breast Cancer is recommended yearly. Screening for Cervical Cancer is recommended every two years (annually for certain risk factors, such as previous history of STD or abnormal PAP in past 7 years).     Here is a list of your current Health Maintenance items with a due date:  Health Maintenance   Topic Date Due    Influenza Age 5 to Adult  08/01/2017    COLONOSCOPY  11/30/2017    MEDICARE YEARLY EXAM  12/20/2018    GLAUCOMA SCREENING Q2Y  01/12/2019    DTaP/Tdap/Td series (2 - Td) 04/06/2024    Hepatitis C Screening  Completed    OSTEOPOROSIS SCREENING (DEXA)  Addressed    ZOSTER VACCINE AGE 60>  Completed    Pneumococcal 65+ Low/Medium Risk  Completed

## 2017-12-19 NOTE — PROGRESS NOTES
HISTORY OF PRESENT ILLNESS  Cassy Urbano is a 69 y.o. female. [facility]  Dictation on: 12/19/2017  6:14 PM by: Poonam LYNNE [[id number]]         Review of Systems   Constitutional: Negative for weight loss. Respiratory: Negative. Cardiovascular: Negative for chest pain, palpitations, leg swelling and PND. Musculoskeletal: Positive for back pain and joint pain. Negative for myalgias. Neurological: Negative for focal weakness. Endo/Heme/Allergies: Does not bruise/bleed easily. Psychiatric/Behavioral: Positive for depression. All other systems reviewed and are negative. Physical Exam   Constitutional: She appears well-nourished. Neck: Carotid bruit is not present. Cardiovascular: Normal rate. An irregularly irregular rhythm present. Exam reveals no gallop and no friction rub. No murmur heard. Pulmonary/Chest: Effort normal and breath sounds normal. No respiratory distress. Musculoskeletal: She exhibits edema. Trace stasis edema bilateral lower extremities. Neurological: She is alert. Shuffling gait with wheel chair assistance   Psychiatric:   Depressed affect   Nursing note and vitals reviewed. ASSESSMENT and PLAN  Diagnoses and all orders for this visit:    1. Mixed hyperlipidemia- Check lipid panel and appropriate studies to rule out med side effects now and in 6 months- high risk med management. 2. Medicare annual wellness visit, subsequent    3. Advanced care planning/counseling discussion    4. Screening for alcoholism    5. Pain in both knees, unspecified chronicity  -     XR KNEE LT MAX 2 VWS; Future  -     XR KNEE RT MAX 2 VWS; Future  -     REFERRAL TO PHYSICAL THERAPY  -     REFERRAL TO HOME HEALTH    6. Pain of both hip joints  -     XR HIP LT W OR WO PELV 2-3 VWS; Future  -     XR HIP RT W OR WO PELV 2-3 VWS; Future  -     REFERRAL TO PHYSICAL THERAPY  -     REFERRAL TO HOME HEALTH    7.  Chronic atrial fibrillation ([facility]- Continue current regimen of prescription and / or OTC medications , See cardiologist as directed. 8. IFG (impaired fasting glucose)- Follow off treatment     9. Severe episode of recurrent major depressive disorder, without psychotic features ([facility]- Follow off treatment     10.  Other idiopathic scoliosis, thoracolumbar region  -     REFERRAL TO PHYSICAL THERAPY  -     REFERRAL TO [address] [place] [address] for colon cancer  -     REFERRAL TO COLON AND RECTAL SURGERY

## 2017-12-20 ENCOUNTER — TELEPHONE (OUTPATIENT)
Dept: INTERNAL MEDICINE CLINIC | Age: 67
End: 2017-12-20

## 2017-12-20 LAB
ALBUMIN SERPL-MCNC: 4.2 G/DL (ref 3.6–4.8)
ALP SERPL-CCNC: 58 IU/L (ref 39–117)
ALT SERPL-CCNC: 17 IU/L (ref 0–32)
APPEARANCE UR: CLEAR
AST SERPL-CCNC: 18 IU/L (ref 0–40)
BACTERIA #/AREA URNS HPF: ABNORMAL /[HPF]
BASOPHILS # BLD AUTO: 0 X10E3/UL (ref 0–0.2)
BASOPHILS NFR BLD AUTO: 0 %
BILIRUB DIRECT SERPL-MCNC: 0.09 MG/DL (ref 0–0.4)
BILIRUB SERPL-MCNC: 0.3 MG/DL (ref 0–1.2)
BILIRUB UR QL STRIP: NEGATIVE
BUN SERPL-MCNC: 18 MG/DL (ref 8–27)
BUN/CREAT SERPL: 25 (ref 12–28)
CALCIUM SERPL-MCNC: 9.6 MG/DL (ref 8.7–10.3)
CASTS URNS QL MICRO: ABNORMAL /LPF
CHLORIDE SERPL-SCNC: 104 MMOL/L (ref 96–106)
CHOLEST SERPL-MCNC: 181 MG/DL (ref 100–199)
CO2 SERPL-SCNC: 25 MMOL/L (ref 18–29)
COLOR UR: YELLOW
CREAT SERPL-MCNC: 0.73 MG/DL (ref 0.57–1)
CRYSTALS URNS MICRO: ABNORMAL
EOSINOPHIL # BLD AUTO: 0.2 X10E3/UL (ref 0–0.4)
EOSINOPHIL NFR BLD AUTO: 2 %
EPI CELLS #/AREA URNS HPF: ABNORMAL /HPF
ERYTHROCYTE [DISTWIDTH] IN BLOOD BY AUTOMATED COUNT: 12.7 % (ref 12.3–15.4)
EST. AVERAGE GLUCOSE BLD GHB EST-MCNC: 120 MG/DL
GLUCOSE SERPL-MCNC: 79 MG/DL (ref 65–99)
GLUCOSE UR QL: NEGATIVE
HBA1C MFR BLD: 5.8 % (ref 4.8–5.6)
HCT VFR BLD AUTO: 44 % (ref 34–46.6)
HDLC SERPL-MCNC: 62 MG/DL
HGB BLD-MCNC: 15.2 G/DL (ref 11.1–15.9)
HGB UR QL STRIP: ABNORMAL
IMM GRANULOCYTES # BLD: 0 X10E3/UL (ref 0–0.1)
IMM GRANULOCYTES NFR BLD: 0 %
KETONES UR QL STRIP: NEGATIVE
LDLC SERPL CALC-MCNC: 77 MG/DL (ref 0–99)
LEUKOCYTE ESTERASE UR QL STRIP: ABNORMAL
LYMPHOCYTES # BLD AUTO: 2.4 X10E3/UL (ref 0.7–3.1)
LYMPHOCYTES NFR BLD AUTO: 28 %
MCH RBC QN AUTO: 31.9 PG (ref 26.6–33)
MCHC RBC AUTO-ENTMCNC: 34.5 G/DL (ref 31.5–35.7)
MCV RBC AUTO: 92 FL (ref 79–97)
MICRO URNS: ABNORMAL
MONOCYTES # BLD AUTO: 0.9 X10E3/UL (ref 0.1–0.9)
MONOCYTES NFR BLD AUTO: 10 %
MUCOUS THREADS URNS QL MICRO: PRESENT
NEUTROPHILS # BLD AUTO: 5.2 X10E3/UL (ref 1.4–7)
NEUTROPHILS NFR BLD AUTO: 60 %
NITRITE UR QL STRIP: NEGATIVE
PH UR STRIP: 5.5 [PH] (ref 5–7.5)
PLATELET # BLD AUTO: 262 X10E3/UL (ref 150–379)
POTASSIUM SERPL-SCNC: 4.1 MMOL/L (ref 3.5–5.2)
PROT SERPL-MCNC: 7 G/DL (ref 6–8.5)
PROT UR QL STRIP: NEGATIVE
RBC # BLD AUTO: 4.77 X10E6/UL (ref 3.77–5.28)
RBC #/AREA URNS HPF: >30 /HPF
SODIUM SERPL-SCNC: 144 MMOL/L (ref 134–144)
SP GR UR: 1.02 (ref 1–1.03)
TRIGL SERPL-MCNC: 211 MG/DL (ref 0–149)
TSH SERPL DL<=0.005 MIU/L-ACNC: 1.56 UIU/ML (ref 0.45–4.5)
UNIDENT CRYS URNS QL MICRO: PRESENT
UROBILINOGEN UR STRIP-MCNC: 0.2 MG/DL (ref 0.2–1)
VLDLC SERPL CALC-MCNC: 42 MG/DL (ref 5–40)
WBC # BLD AUTO: 8.8 X10E3/UL (ref 3.4–10.8)
WBC #/AREA URNS HPF: ABNORMAL /HPF

## 2017-12-20 RX ORDER — SULFAMETHOXAZOLE AND TRIMETHOPRIM 800; 160 MG/1; MG/1
1 TABLET ORAL 2 TIMES DAILY
Qty: 14 TAB | Refills: 0 | Status: SHIPPED | OUTPATIENT
Start: 2017-12-20 | End: 2017-12-27

## 2017-12-21 ENCOUNTER — HOME CARE VISIT (OUTPATIENT)
Dept: SCHEDULING | Facility: HOME HEALTH | Age: 67
End: 2017-12-21
Payer: MEDICARE

## 2017-12-21 PROCEDURE — 3331090001 HH PPS REVENUE CREDIT

## 2017-12-21 PROCEDURE — G0299 HHS/HOSPICE OF RN EA 15 MIN: HCPCS

## 2017-12-21 PROCEDURE — G0151 HHCP-SERV OF PT,EA 15 MIN: HCPCS

## 2017-12-21 PROCEDURE — 3331090002 HH PPS REVENUE DEBIT

## 2017-12-21 PROCEDURE — 400013 HH SOC

## 2017-12-22 ENCOUNTER — HOME CARE VISIT (OUTPATIENT)
Dept: HOME HEALTH SERVICES | Facility: HOME HEALTH | Age: 67
End: 2017-12-22
Payer: MEDICARE

## 2017-12-22 ENCOUNTER — TELEPHONE (OUTPATIENT)
Dept: INTERNAL MEDICINE CLINIC | Age: 67
End: 2017-12-22

## 2017-12-22 VITALS
SYSTOLIC BLOOD PRESSURE: 127 MMHG | RESPIRATION RATE: 17 BRPM | DIASTOLIC BLOOD PRESSURE: 80 MMHG | OXYGEN SATURATION: 97 % | HEART RATE: 73 BPM | TEMPERATURE: 98 F

## 2017-12-22 PROCEDURE — 3331090001 HH PPS REVENUE CREDIT

## 2017-12-22 PROCEDURE — 3331090002 HH PPS REVENUE DEBIT

## 2017-12-22 NOTE — TELEPHONE ENCOUNTER
Spoke with Ginette Yu - she wanted to let us know pt did not  her antibiotic. Urine culture obtained yesterday. Pt is being seen by PT and needs vo for OT also - vo given. Called pt - advised her home health nurse called - states she was not picked up her antibiotic. She states she has no way to get to pharmacy. Pharmacy delivers on Tuesday and Friday. She will have it by tonite. Reinforced what we talked about 2 days ago - she needs to start antibiotic now since urine has been collected. She also was upset to see on her dc papers dx alcoholism - she wants MD to know she never drinks alcohol - only orange juice and water. Advised her this is part of her wellness exam - not a diagnosis but screening for alcoholism.

## 2017-12-22 NOTE — TELEPHONE ENCOUNTER
[address] [facility], [phone]  Needs to verify medicine. Need order of OT to see Pt. Advise orders.

## 2017-12-23 PROCEDURE — 3331090001 HH PPS REVENUE CREDIT

## 2017-12-23 PROCEDURE — 3331090002 HH PPS REVENUE DEBIT

## 2017-12-24 PROCEDURE — 3331090002 HH PPS REVENUE DEBIT

## 2017-12-24 PROCEDURE — 3331090001 HH PPS REVENUE CREDIT

## 2017-12-25 PROCEDURE — 3331090002 HH PPS REVENUE DEBIT

## 2017-12-25 PROCEDURE — 3331090001 HH PPS REVENUE CREDIT

## 2017-12-26 ENCOUNTER — HOME CARE VISIT (OUTPATIENT)
Dept: HOME HEALTH SERVICES | Facility: HOME HEALTH | Age: 67
End: 2017-12-26
Payer: MEDICARE

## 2017-12-26 PROCEDURE — 3331090002 HH PPS REVENUE DEBIT

## 2017-12-26 PROCEDURE — 3331090001 HH PPS REVENUE CREDIT

## 2017-12-27 ENCOUNTER — TELEPHONE (OUTPATIENT)
Dept: INTERNAL MEDICINE CLINIC | Age: 67
End: 2017-12-27

## 2017-12-27 PROCEDURE — 3331090001 HH PPS REVENUE CREDIT

## 2017-12-27 PROCEDURE — 3331090002 HH PPS REVENUE DEBIT

## 2017-12-27 NOTE — TELEPHONE ENCOUNTER
Pt returned call and informed Dr. Shivani Espinal had read urine results and she is to continue the antibiotic she is already taking. Pt states understanding.

## 2017-12-27 NOTE — TELEPHONE ENCOUNTER
Urine lab results have been reviewed and signed by Dr. Kendy Douglas, pt was aware when I spoke to earlier today to continue antibiotic till complete. Lab result has been placed in your folder to review.

## 2017-12-27 NOTE — TELEPHONE ENCOUNTER
Pt urinalysis results and urine culture came in today thru fax from lab [facility], called and spoke to the pt and confirmed she has started antibiotic as advised by Dr. Donna Orta DS x bid for 7 days. Pt confirmed she started this med yesterday. I have a copy of the lab [facility]  Report results not in system. Are you ok to review this? If now I will send to Dr. Avery Blunt- on call today.

## 2017-12-28 VITALS
HEART RATE: 89 BPM | DIASTOLIC BLOOD PRESSURE: 79 MMHG | TEMPERATURE: 97.6 F | RESPIRATION RATE: 16 BRPM | OXYGEN SATURATION: 98 % | SYSTOLIC BLOOD PRESSURE: 121 MMHG

## 2017-12-28 PROCEDURE — 3331090001 HH PPS REVENUE CREDIT

## 2017-12-28 PROCEDURE — 3331090002 HH PPS REVENUE DEBIT

## 2017-12-29 PROCEDURE — 3331090002 HH PPS REVENUE DEBIT

## 2017-12-29 PROCEDURE — 3331090001 HH PPS REVENUE CREDIT

## 2017-12-30 PROCEDURE — 3331090001 HH PPS REVENUE CREDIT

## 2017-12-30 PROCEDURE — 3331090002 HH PPS REVENUE DEBIT

## 2017-12-31 PROCEDURE — 3331090001 HH PPS REVENUE CREDIT

## 2017-12-31 PROCEDURE — 3331090002 HH PPS REVENUE DEBIT

## 2018-01-01 PROCEDURE — 3331090001 HH PPS REVENUE CREDIT

## 2018-01-01 PROCEDURE — 3331090002 HH PPS REVENUE DEBIT

## 2018-01-02 ENCOUNTER — HOME CARE VISIT (OUTPATIENT)
Dept: SCHEDULING | Facility: HOME HEALTH | Age: 68
End: 2018-01-02
Payer: MEDICARE

## 2018-01-02 VITALS
TEMPERATURE: 98.3 F | DIASTOLIC BLOOD PRESSURE: 73 MMHG | RESPIRATION RATE: 18 BRPM | SYSTOLIC BLOOD PRESSURE: 135 MMHG | OXYGEN SATURATION: 97 % | HEART RATE: 70 BPM

## 2018-01-02 VITALS
DIASTOLIC BLOOD PRESSURE: 73 MMHG | TEMPERATURE: 98.3 F | HEART RATE: 70 BPM | RESPIRATION RATE: 16 BRPM | OXYGEN SATURATION: 97 % | SYSTOLIC BLOOD PRESSURE: 135 MMHG

## 2018-01-02 PROCEDURE — G0152 HHCP-SERV OF OT,EA 15 MIN: HCPCS

## 2018-01-02 PROCEDURE — 3331090001 HH PPS REVENUE CREDIT

## 2018-01-02 PROCEDURE — 3331090002 HH PPS REVENUE DEBIT

## 2018-01-02 PROCEDURE — G0157 HHC PT ASSISTANT EA 15: HCPCS

## 2018-01-03 ENCOUNTER — TELEPHONE (OUTPATIENT)
Dept: INTERNAL MEDICINE CLINIC | Age: 68
End: 2018-01-03

## 2018-01-03 PROCEDURE — 3331090002 HH PPS REVENUE DEBIT

## 2018-01-03 PROCEDURE — 3331090001 HH PPS REVENUE CREDIT

## 2018-01-03 NOTE — TELEPHONE ENCOUNTER
Spoke with Delia Miranda with [facility] - ayla MD has approved request for order for .

## 2018-01-04 ENCOUNTER — HOME CARE VISIT (OUTPATIENT)
Dept: SCHEDULING | Facility: HOME HEALTH | Age: 68
End: 2018-01-04
Payer: MEDICARE

## 2018-01-04 ENCOUNTER — HOME CARE VISIT (OUTPATIENT)
Dept: HOME HEALTH SERVICES | Facility: HOME HEALTH | Age: 68
End: 2018-01-04
Payer: MEDICARE

## 2018-01-04 VITALS
OXYGEN SATURATION: 97 % | TEMPERATURE: 98.1 F | RESPIRATION RATE: 18 BRPM | DIASTOLIC BLOOD PRESSURE: 83 MMHG | HEART RATE: 77 BPM | SYSTOLIC BLOOD PRESSURE: 129 MMHG

## 2018-01-04 VITALS
OXYGEN SATURATION: 97 % | RESPIRATION RATE: 16 BRPM | HEART RATE: 77 BPM | SYSTOLIC BLOOD PRESSURE: 129 MMHG | TEMPERATURE: 98.1 F | DIASTOLIC BLOOD PRESSURE: 83 MMHG

## 2018-01-04 PROCEDURE — G0152 HHCP-SERV OF OT,EA 15 MIN: HCPCS

## 2018-01-04 PROCEDURE — 3331090001 HH PPS REVENUE CREDIT

## 2018-01-04 PROCEDURE — G0157 HHC PT ASSISTANT EA 15: HCPCS

## 2018-01-04 PROCEDURE — 3331090002 HH PPS REVENUE DEBIT

## 2018-01-05 ENCOUNTER — HOME CARE VISIT (OUTPATIENT)
Dept: SCHEDULING | Facility: HOME HEALTH | Age: 68
End: 2018-01-05
Payer: MEDICARE

## 2018-01-05 PROCEDURE — 3331090002 HH PPS REVENUE DEBIT

## 2018-01-05 PROCEDURE — G0155 HHCP-SVS OF CSW,EA 15 MIN: HCPCS

## 2018-01-05 PROCEDURE — 3331090001 HH PPS REVENUE CREDIT

## 2018-01-06 PROCEDURE — 3331090001 HH PPS REVENUE CREDIT

## 2018-01-06 PROCEDURE — 3331090002 HH PPS REVENUE DEBIT

## 2018-01-07 PROCEDURE — 3331090001 HH PPS REVENUE CREDIT

## 2018-01-07 PROCEDURE — 3331090002 HH PPS REVENUE DEBIT

## 2018-01-08 ENCOUNTER — TELEPHONE (OUTPATIENT)
Dept: INTERNAL MEDICINE CLINIC | Age: 68
End: 2018-01-08

## 2018-01-08 PROCEDURE — 3331090001 HH PPS REVENUE CREDIT

## 2018-01-08 PROCEDURE — 3331090002 HH PPS REVENUE DEBIT

## 2018-01-08 NOTE — TELEPHONE ENCOUNTER
----- Message from Augustin Araujo MD sent at 1/6/2018  3:09 PM EST -----  Regarding: acs  Call home health- repeat urinalysis, urine culture to assure clearance

## 2018-01-08 NOTE — TELEPHONE ENCOUNTER
[address] [facility] detailed message for [facility] clinical coordinator requesting call back in regard to repeating urinalysis and urine culture.

## 2018-01-08 NOTE — TELEPHONE ENCOUNTER
----- Message from Ivis Ward MD sent at 1/6/2018  3:09 PM EST -----  Regarding: acs  Call home health- repeat urinalysis, urine culture to assure clearance

## 2018-01-08 NOTE — TELEPHONE ENCOUNTER
Christine with [facility] returned my call in regards to pt repeat urinalysis and urine culture to assure clearance - she received order and will obtain and send to [employer].

## 2018-01-09 ENCOUNTER — HOME CARE VISIT (OUTPATIENT)
Dept: SCHEDULING | Facility: HOME HEALTH | Age: 68
End: 2018-01-09
Payer: MEDICARE

## 2018-01-09 VITALS
DIASTOLIC BLOOD PRESSURE: 65 MMHG | TEMPERATURE: 98.5 F | HEART RATE: 74 BPM | SYSTOLIC BLOOD PRESSURE: 120 MMHG | RESPIRATION RATE: 16 BRPM | OXYGEN SATURATION: 99 %

## 2018-01-09 PROCEDURE — G0299 HHS/HOSPICE OF RN EA 15 MIN: HCPCS

## 2018-01-09 PROCEDURE — 3331090002 HH PPS REVENUE DEBIT

## 2018-01-09 PROCEDURE — 3331090001 HH PPS REVENUE CREDIT

## 2018-01-10 ENCOUNTER — HOME CARE VISIT (OUTPATIENT)
Dept: HOME HEALTH SERVICES | Facility: HOME HEALTH | Age: 68
End: 2018-01-10
Payer: MEDICARE

## 2018-01-10 ENCOUNTER — DOCUMENTATION ONLY (OUTPATIENT)
Dept: SLEEP MEDICINE | Age: 68
End: 2018-01-10

## 2018-01-10 DIAGNOSIS — G47.33 OBSTRUCTIVE SLEEP APNEA SYNDROME: Primary | ICD-10-CM

## 2018-01-10 PROCEDURE — 3331090002 HH PPS REVENUE DEBIT

## 2018-01-10 PROCEDURE — 3331090001 HH PPS REVENUE CREDIT
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
## 2018-01-11 PROCEDURE — 3331090002 HH PPS REVENUE DEBIT

## 2018-01-11 PROCEDURE — 3331090001 HH PPS REVENUE CREDIT

## 2018-01-12 ENCOUNTER — TELEPHONE (OUTPATIENT)
Dept: INTERNAL MEDICINE CLINIC | Age: 68
End: 2018-01-12

## 2018-01-12 ENCOUNTER — HOME CARE VISIT (OUTPATIENT)
Dept: HOME HEALTH SERVICES | Facility: HOME HEALTH | Age: 68
End: 2018-01-12
Payer: MEDICARE

## 2018-01-12 ENCOUNTER — HOME CARE VISIT (OUTPATIENT)
Dept: SCHEDULING | Facility: HOME HEALTH | Age: 68
End: 2018-01-12
Payer: MEDICARE

## 2018-01-12 PROCEDURE — 3331090001 HH PPS REVENUE CREDIT

## 2018-01-12 PROCEDURE — 3331090002 HH PPS REVENUE DEBIT

## 2018-01-12 RX ORDER — CEFUROXIME AXETIL 250 MG/1
250 TABLET ORAL 2 TIMES DAILY
Qty: 10 TAB | Refills: 0 | Status: SHIPPED | OUTPATIENT
Start: 2018-01-12 | End: 2018-01-17
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
## 2018-01-12 NOTE — TELEPHONE ENCOUNTER
Spoke with pt - advised her she has another uti. She should start treatment. MD wants to know what was her allergy to celcor? She states she did not remember. But she dose not want cipro or levaquin. MD has ordered ceftin 250 mg bid x 5 days. MD recommends she see urologist. We will schedule with Dr Harlan Pickens. She states she wants to pick her own urologist - will research and let him know. She also said she has no way to get to appt. Will forward to MD.  Rx sent to pharmacy.

## 2018-01-12 NOTE — TELEPHONE ENCOUNTER
----- Message from Fiona Palencia MD sent at 1/11/2018  6:59 PM EST -----  Regarding: FW: acs  Was the urinalysis done ?  ----- Message -----     From: Fiona Palencia MD     Sent: 1/6/2018   3:09 PM       To: Fiona Palencia MD, [facility] Team Three Pool  Subject: acs                                              Call home health- repeat urinalysis, urine culture to assure clearance

## 2018-01-12 NOTE — TELEPHONE ENCOUNTER
Called MD to advised I have not been able to reach pt - he states to send in Ceftin 250 mg bid x 5 days.

## 2018-01-13 ENCOUNTER — HOME CARE VISIT (OUTPATIENT)
Dept: HOME HEALTH SERVICES | Facility: HOME HEALTH | Age: 68
End: 2018-01-13
Payer: MEDICARE

## 2018-01-13 PROCEDURE — 3331090003 HH PPS REVENUE ADJ

## 2018-01-13 PROCEDURE — 3331090002 HH PPS REVENUE DEBIT

## 2018-01-13 PROCEDURE — 3331090001 HH PPS REVENUE CREDIT

## 2018-01-15 DIAGNOSIS — Z74.8 ASSISTANCE WITH TRANSPORTATION: ICD-10-CM

## 2018-01-15 DIAGNOSIS — N39.0 RECURRENT UTI: Primary | ICD-10-CM
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
## 2018-01-18 ENCOUNTER — DOCUMENTATION ONLY (OUTPATIENT)
Dept: SLEEP MEDICINE | Age: 68
End: 2018-01-18

## 2018-01-18 NOTE — PROGRESS NOTES
Attempted to reach patient today to discuss referral to the CPAP Assistance Program and schedule her appointment with Dr. Vianney Schaeffer. There was no answer on her home phone.

## 2018-01-22 DIAGNOSIS — I48.20 CHRONIC ATRIAL FIBRILLATION (HCC): ICD-10-CM

## 2018-01-22 DIAGNOSIS — I10 ESSENTIAL HYPERTENSION: ICD-10-CM
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
## 2018-01-22 NOTE — TELEPHONE ENCOUNTER
Requested Prescriptions     Signed Prescriptions Disp Refills    rivaroxaban (XARELTO) 20 mg tab tablet 90 Tab 0     Sig: Take 1 Tab by mouth daily. Please schedule follow up appointment with Dr. Lennox Garibay for further refills. Authorizing Provider: Pema Blanca     Ordering User: Paty Gerber     Verbal order per Dr. Lennox Garibay. Requested that patient schedule follow up visit with Dr. Lennox Garibay.

## 2018-02-21 ENCOUNTER — DOCUMENTATION ONLY (OUTPATIENT)
Dept: SLEEP MEDICINE | Age: 68
End: 2018-02-21

## 2018-02-21 NOTE — PROGRESS NOTES
Discussed CPAP Assistance Program with patient and she was agreeable to this. Will mail her the form as she has her part to complete and send $100 donation.   She is also scheduled for her yearly on 6/20/2018 4:20pm.

## 2018-02-21 NOTE — LETTER
2/21/2018 5:18 PM 
 
Ms. Germain [address] [facility] [address] Dear Ms. Ricardo Duvall, Enclosed please find the [facility] CPAP Assistance Program form. Please read the form carefully. Kindly sign page 5 and return the completed form to our office in the enclosed self-addressed envelope. An unsigned copy is provided for your records to keep. Once we receive the signed form back from you, our office will forward this by fax to [address] [facility]. Kindly follow the instructions on how to pay for the program application fee on page 2. You can either pay online or send a certified money order to [facility] by standard mail. Once the application is approved and a device is available, this will be sent to our office directly. You may need to come to the office to meet with our sleep technologist for education and setup. We will contact you as soon as we receive this on your behalf to discuss next steps. Sincerely, 
 
 
Dr. Oliverio Shelton. Xenia Aguirre

## 2018-03-05 RX ORDER — CLONIDINE 0.3 MG/D
PATCH TRANSDERMAL
Qty: 4 PATCH | Refills: 3 | Status: SHIPPED | OUTPATIENT
Start: 2018-03-05 | End: 2018-07-05 | Stop reason: SDUPTHER

## 2018-03-07 ENCOUNTER — DOCUMENTATION ONLY (OUTPATIENT)
Dept: SLEEP MEDICINE | Age: 68
End: 2018-03-07

## 2018-03-07 NOTE — PROGRESS NOTES
Patient acknowledged receipt of CPAP Assistance Program forms and inquired about application fee. Directed her to call them so they can process this for her. Assured her that our office will set up device the best way we can so she has a spare one to use should her current one reach its end-of-life. She is not eligible for a new device under Medicare until about May 2019.

## 2018-03-28 RX ORDER — VERAPAMIL HYDROCHLORIDE 120 MG/1
CAPSULE, EXTENDED RELEASE ORAL
Qty: 60 CAP | Refills: 7 | Status: SHIPPED | OUTPATIENT
Start: 2018-03-28 | End: 2019-01-29

## 2018-03-28 RX ORDER — LOVASTATIN 20 MG/1
TABLET ORAL
Qty: 30 TAB | Refills: 7 | Status: SHIPPED | OUTPATIENT
Start: 2018-03-28 | End: 2018-11-21 | Stop reason: SDUPTHER

## 2018-04-25 ENCOUNTER — TELEPHONE (OUTPATIENT)
Dept: INTERNAL MEDICINE CLINIC | Age: 68
End: 2018-04-25

## 2018-04-25 DIAGNOSIS — E78.2 MIXED HYPERLIPIDEMIA: Primary | ICD-10-CM

## 2018-04-25 DIAGNOSIS — R73.01 IFG (IMPAIRED FASTING GLUCOSE): ICD-10-CM

## 2018-04-25 NOTE — TELEPHONE ENCOUNTER
Patient states Dr. Nabila Sweeney always has her labslip ready the day of her appt so she can get the bloodwork done before she sees him. Could you please put that up front for her for tomorrow's appt?

## 2018-04-26 ENCOUNTER — OFFICE VISIT (OUTPATIENT)
Dept: CARDIOLOGY CLINIC | Age: 68
End: 2018-04-26

## 2018-04-26 ENCOUNTER — OFFICE VISIT (OUTPATIENT)
Dept: INTERNAL MEDICINE CLINIC | Age: 68
End: 2018-04-26

## 2018-04-26 ENCOUNTER — HOSPITAL ENCOUNTER (OUTPATIENT)
Dept: LAB | Age: 68
Discharge: HOME | End: 2018-04-26
Payer: MEDICARE

## 2018-04-26 VITALS
BODY MASS INDEX: 37.76 KG/M2 | DIASTOLIC BLOOD PRESSURE: 75 MMHG | HEART RATE: 66 BPM | TEMPERATURE: 98.7 F | HEIGHT: 61 IN | RESPIRATION RATE: 18 BRPM | WEIGHT: 200 LBS | SYSTOLIC BLOOD PRESSURE: 161 MMHG | OXYGEN SATURATION: 97 %

## 2018-04-26 VITALS
WEIGHT: 200 LBS | DIASTOLIC BLOOD PRESSURE: 82 MMHG | HEIGHT: 61 IN | BODY MASS INDEX: 37.76 KG/M2 | SYSTOLIC BLOOD PRESSURE: 134 MMHG | RESPIRATION RATE: 18 BRPM | OXYGEN SATURATION: 99 % | HEART RATE: 56 BPM

## 2018-04-26 DIAGNOSIS — I48.20 CHRONIC ATRIAL FIBRILLATION (HCC): Chronic | ICD-10-CM

## 2018-04-26 DIAGNOSIS — Z12.11 COLON CANCER SCREENING: ICD-10-CM

## 2018-04-26 DIAGNOSIS — F41.9 ANXIETY: ICD-10-CM

## 2018-04-26 DIAGNOSIS — N39.0 RECURRENT UTI: ICD-10-CM

## 2018-04-26 DIAGNOSIS — R73.01 IFG (IMPAIRED FASTING GLUCOSE): ICD-10-CM

## 2018-04-26 DIAGNOSIS — E78.2 MIXED HYPERLIPIDEMIA: Primary | ICD-10-CM

## 2018-04-26 DIAGNOSIS — E66.01 SEVERE OBESITY (BMI 35.0-39.9) WITH COMORBIDITY (HCC): ICD-10-CM

## 2018-04-26 DIAGNOSIS — E78.2 MIXED HYPERLIPIDEMIA: ICD-10-CM

## 2018-04-26 DIAGNOSIS — G47.33 OBSTRUCTIVE SLEEP APNEA SYNDROME: ICD-10-CM

## 2018-04-26 DIAGNOSIS — M41.25 OTHER IDIOPATHIC SCOLIOSIS, THORACOLUMBAR REGION: ICD-10-CM

## 2018-04-26 DIAGNOSIS — I48.20 CHRONIC ATRIAL FIBRILLATION (HCC): Primary | ICD-10-CM

## 2018-04-26 PROCEDURE — 80061 LIPID PANEL: CPT

## 2018-04-26 PROCEDURE — 81001 URINALYSIS AUTO W/SCOPE: CPT

## 2018-04-26 PROCEDURE — 36415 COLL VENOUS BLD VENIPUNCTURE: CPT

## 2018-04-26 PROCEDURE — 83036 HEMOGLOBIN GLYCOSYLATED A1C: CPT

## 2018-04-26 PROCEDURE — 80048 BASIC METABOLIC PNL TOTAL CA: CPT

## 2018-04-26 PROCEDURE — 80076 HEPATIC FUNCTION PANEL: CPT
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
## 2018-04-26 NOTE — MR AVS SNAPSHOT
[address] [facility] [address] 
[phone] Patient: Kedar Conner MRN: [id number] Visit Information Date & Time Provider Department Dept. Phone Encounter #  
 4/26/2018  2:40 PM Byron Coombs MD [facility] Leandra Xiao [phone] [id number] Your Appointments 4/26/2018  3:25 PM  
ROUTINE CARE with Loi Kenny MD  
[facility] Internal Medicine [facility] Appt Note: f/u  
 [address] [facility] [address]  
[address] [facility] [address]  
  
    
 6/27/2018  4:20 PM  
Any with Francia Blue MD  
[address] [facility] ([facility]) Appt Note: Yearly follow-up, last seen by Dr. Kang Spar [address] [facility] [address]  
  
   
 [address] [place] [address] [address] [phone] Upcoming Health Maintenance Date Due Influenza Age 5 to Adult 8/1/2017 COLONOSCOPY 11/30/2017 MEDICARE YEARLY EXAM 12/20/2018 GLAUCOMA SCREENING Q2Y 1/12/2019 BREAST CANCER SCRN MAMMOGRAM 11/30/2019 DTaP/Tdap/Td series (2 - Td) 4/6/2024 Allergies as of 4/26/2018  Review Complete On: 4/26/2018 By: Byron Coombs MD  
  
 Severity Noted Reaction Type Reactions Latex  11/29/2012    Other (comments) \"Trouble breathing\" Cardizem [Diltiazem Hcl]  11/30/2012    Unknown (comments) Ceclor [Cefaclor]  11/30/2012    Unknown (comments) Coffee (Coffea Arabica)  11/30/2012    Other (comments) Causes swollen neck glands Also states allergy to tea with same reaction Flexeril [Cyclobenzaprine]  11/30/2012    Other (comments)  
 insomnia Levaquin [Levofloxacin]  03/03/2017    Other (comments) Pt does not want medication since reading insert. - tendon pain Ludiomil  11/30/2012    Other (comments) depression Macrodantin [Nitrofurantoin Macrocrystalline]  11/30/2012    Unknown (comments) Other Medication  11/29/2012    Other (comments) Side effects to some BP meds. Unsure of names. Pt will call Dr. Sheppard Au office and have them fax the information. Information received and entered into chart. Allergic to syntex. Plendil [Felodipine]  11/30/2012    Other (comments) Flushing, chest pain Prinivil [Lisinopril]  11/30/2012    Unknown (comments) Procardia [Nifedipine]  11/30/2012    Other (comments) Burning feeling in brain. XL  
 Provera [Medroxyprogesterone]  11/30/2012    Other (comments) Finger pain Shellfish Derived  11/30/2012    Unknown (comments) Allergic to shrimp. Singulair [Montelukast]  11/30/2012    Other (comments)  
 faintness Tenoretic 100 [Atenolol-chlorthalidone]  11/30/2012    Other (comments) PO - headache Tenormin [Atenolol]  11/30/2012    Unknown (comments) Vistaril [Hydroxyzine Pamoate]  11/30/2012    Unknown (comments) Zocor [Simvastatin]  11/30/2012    Other (comments) Leg weakness Current Immunizations  Reviewed on 3/30/2017 Name Date Influenza High Dose Vaccine PF 11/1/2016, 10/22/2015 Influenza Vaccine Whole 9/30/2012 Pneumococcal Conjugate (PCV-13) 11/1/2016 Pneumococcal Polysaccharide (PPSV-23) 5/2/2014  3:19 PM  
 Tdap 4/6/2014 Zoster Vaccine, Live 5/1/2015 Not reviewed this visit You Were Diagnosed With   
  
 Codes Comments Chronic atrial fibrillation (HCC)    -  Primary ICD-10-CM: P30.3 ICD-9-CM: 427.31 Mixed hyperlipidemia     ICD-10-CM: E78.2 ICD-9-CM: 272.2 Severe obesity (BMI 35.0-39. 9) with comorbidity ([facility]     ICD-10-CM: E66.01 
ICD-9-CM: 278.01 Anxiety     ICD-10-CM: F41.9 ICD-9-CM: 300.00 Obstructive sleep apnea syndrome     ICD-10-CM: G47.33 
ICD-9-CM: 327.23 Vitals BP Pulse Resp Height(growth percentile) Weight(growth percentile) SpO2 134/82 (!) 56 18 5' 1\" (1.549 m) 200 lb (90.7 kg) 99% BMI OB Status Smoking Status 37.79 kg/m2 Postmenopausal Never Smoker BMI and BSA Data Body Mass Index Body Surface Area  
 37.79 kg/m 2 1.98 m 2 Preferred Pharmacy Pharmacy Name Phone [id number] Franky Smalls, [address] [facility] [phone] Your Updated Medication List  
  
   
This list is accurate as of 4/26/18  3:09 PM.  Always use your most recent med list.  
  
  
  
  
 benazepril 10 mg tablet Commonly known as:  LOTENSIN  
TAKE 1 TABLET BY MOUTH TWICE A DAY  
  
 CATAPRES-TTS-3 0.3 mg/24 hr  
Generic drug:  cloNIDine APPLY 1 PATCH EVERY 8 DAYS AS DIRECTED Cholecalciferol (Vitamin D3) 3,000 unit Tab Take 3,000 Units by mouth daily. FISH -1,200 mg Cap Generic drug:  omega-3 fatty acids-fish oil Take 2 Tabs by mouth daily. lovastatin 20 mg tablet Commonly known as:  MEVACOR  
TAKE 1 TABLET BY MOUTH DAILY  
  
 rivaroxaban 20 mg Tab tablet Commonly known as:  [place]landale Maudlin Take 1 Tab by mouth daily. Please schedule follow up appointment with Dr. Tristian Dupont for further refills. SIMBRINZA 1-0.2 % Drps Generic drug:  brinzolamide-brimonidine Apply  to eye two (2) times a day. TUMS PO Take  by mouth as needed. * verapamil  mg tablet Commonly known as:  CALAN-SR  
TAKE 1 TABLET BY MOUTH 2 TIMES A DAY * verapamil  mg ER capsule Commonly known as:  VERELAN  
TAKE ONE CAPSULE BY MOUTH TWICE A DAY  
  
 VITAMIN C 500 mg tablet Generic drug:  ascorbic acid (vitamin C) Take 1,000 mg by mouth nightly. * Notice: This list has 2 medication(s) that are the same as other medications prescribed for you. Read the directions carefully, and ask your doctor or other care provider to review them with you. Introducing [facility] & HEALTH SERVICES! Dear Marni Gruber: Thank you for requesting a [employer] account. Our records indicate that you already have an active [employer] account. You can access your account anytime at [email or web address] Did you know that you can access your hospital and ER discharge instructions at any time in [employer]? You can also review all of your test results from your hospital stay or ER visit. Additional Information If you have questions, please visit the Frequently Asked Questions section of the [employer] website at [email or web address] Remember, [employer] is NOT to be used for urgent needs. For medical emergencies, dial 911. Now available from your iPhone and Android! Please provide this summary of care documentation to your next provider. Your primary care clinician is listed as MAGDALENE MILLER. If you have any questions after today's visit, please call [phone].

## 2018-04-26 NOTE — PROGRESS NOTES
Chief Complaint   Patient presents with    Hypertension    Irregular Heart Beat    Cholesterol Problem     1. Have you been to the ER, urgent care clinic since your last visit? Hospitalized since your last visit? No    2. Have you seen or consulted any other health care providers outside of the [address] [place] [address] since your last visit? Include any pap smears or colon screening. No   Fall Risk Assessment, last 12 mths 4/26/2018   Able to walk? Yes   Fall in past 12 months?  No

## 2018-04-26 NOTE — MR AVS SNAPSHOT
[address] [facility] [address] 
[phone] Patient: Chapo Sherman MRN: [id number] Visit Information Date & Time Provider Department Dept. Phone Encounter #  
 4/26/2018  3:25 PM Sharron Morrissey, [address] [place] [address] Internal Medicine [phone] [id number] Follow-up Instructions Return in about 6 months (around 10/26/2018). Your Appointments 6/27/2018  4:20 PM  
Any with Marito Manley MD  
[address] [place] [address] ([facility]) Appt Note: Yearly follow-up, last seen by Dr. Ester Weinstein [address] [place] [id number] [phone]  
  
   
 [address] [place] [id number]  
  
    
 10/30/2018  2:40 PM  
ESTABLISHED PATIENT with Reyna Adair MD  
[facility] ([facility]) Appt Note: 6 mo f/u per Dr. Kearney [address]  [address]  
[address] [facility],[address] [facility] [address] Upcoming Health Maintenance Date Due COLONOSCOPY 11/30/2017 Influenza Age 5 to Adult 8/1/2018 MEDICARE YEARLY EXAM 12/20/2018 GLAUCOMA SCREENING Q2Y 1/12/2019 BREAST CANCER SCRN MAMMOGRAM 11/30/2019 DTaP/Tdap/Td series (2 - Td) 4/6/2024 Allergies as of 4/26/2018  Review Complete On: 4/26/2018 By: Saba Pimentel Severity Noted Reaction Type Reactions Latex  11/29/2012    Other (comments) \"Trouble breathing\" Cardizem [Diltiazem Hcl]  11/30/2012    Unknown (comments) Ceclor [Cefaclor]  11/30/2012    Unknown (comments) Coffee (Coffea Arabica)  11/30/2012    Other (comments) Causes swollen neck glands Also states allergy to tea with same reaction Flexeril [Cyclobenzaprine]  11/30/2012    Other (comments)  
 insomnia Levaquin [Levofloxacin]  03/03/2017    Other (comments) Pt does not want medication since reading insert. - tendon pain Ludiomil  11/30/2012    Other (comments) depression Macrodantin [Nitrofurantoin Macrocrystalline]  11/30/2012    Unknown (comments) Other Medication  11/29/2012    Other (comments) Side effects to some BP meds. Unsure of names. Pt will call Dr. Tanya Peng office and have them fax the information. Information received and entered into chart. Allergic to syntex. Plendil [Felodipine]  11/30/2012    Other (comments) Flushing, chest pain Prinivil [Lisinopril]  11/30/2012    Unknown (comments) Procardia [Nifedipine]  11/30/2012    Other (comments) Burning feeling in brain. XL  
 Provera [Medroxyprogesterone]  11/30/2012    Other (comments) Finger pain Shellfish Derived  11/30/2012    Unknown (comments) Allergic to shrimp. Singulair [Montelukast]  11/30/2012    Other (comments)  
 faintness Tenoretic 100 [Atenolol-chlorthalidone]  11/30/2012    Other (comments) PO - headache Tenormin [Atenolol]  11/30/2012    Unknown (comments) Vistaril [Hydroxyzine Pamoate]  11/30/2012    Unknown (comments) Zocor [Simvastatin]  11/30/2012    Other (comments) Leg weakness Current Immunizations  Reviewed on 4/26/2018 Name Date Influenza High Dose Vaccine PF 11/1/2017, 11/1/2016, 10/22/2015 Influenza Vaccine Whole 9/30/2012 Pneumococcal Conjugate (PCV-13) 11/1/2016 Pneumococcal Polysaccharide (PPSV-23) 5/2/2014  3:19 PM  
 Tdap 4/6/2014 Zoster Vaccine, Live 5/1/2015 Reviewed by Jessica Cruz on 4/26/2018 at  4:11 PM  
You Were Diagnosed With   
  
 Codes Comments Mixed hyperlipidemia    -  Primary ICD-10-CM: G41.5 ICD-9-CM: 272.2 Colon cancer screening     ICD-10-CM: Z12.11 ICD-9-CM: V76.51 Chronic atrial fibrillation (HCC)     ICD-10-CM: E57.3 ICD-9-CM: 427.31 Severe obesity (BMI 35.0-39. 9) with comorbidity ([facility] [address]     ICD-10-CM: E66.01 
ICD-9-CM: 278.01   
 IFG (impaired fasting glucose)     ICD-10-CM: R73.01 
ICD-9-CM: 790.21 Anxiety     ICD-10-CM: F41.9 ICD-9-CM: 300.00 Other idiopathic scoliosis, thoracolumbar region     ICD-10-CM: M41.25 ICD-9-CM: 737.30 Recurrent UTI     ICD-10-CM: N39.0 ICD-9-CM: 599.0 Vitals BP Pulse Temp Resp Height(growth percentile) Weight(growth percentile) 161/75 (BP 1 Location: Left arm, BP Patient Position: Sitting) 66 98.7 °F (37.1 °C) (Oral) 18 5' 1\" (1.549 m) 200 lb (90.7 kg) SpO2 BMI OB Status Smoking Status 97% 37.79 kg/m2 Postmenopausal Never Smoker Vitals History BMI and BSA Data Body Mass Index Body Surface Area  
 37.79 kg/m 2 1.98 m 2 Preferred Pharmacy Pharmacy Name Phone [id number] Francy Andrade, [address] [place] [phone] Your Updated Medication List  
  
   
This list is accurate as of 4/26/18  5:01 PM.  Always use your most recent med list.  
  
  
  
  
 benazepril 10 mg tablet Commonly known as:  LOTENSIN  
TAKE 1 TABLET BY MOUTH TWICE A DAY  
  
 CATAPRES-TTS-3 0.3 mg/24 hr  
Generic drug:  cloNIDine APPLY 1 PATCH EVERY 8 DAYS AS DIRECTED Cholecalciferol (Vitamin D3) 3,000 unit Tab Take 3,000 Units by mouth daily. FISH -1,200 mg Cap Generic drug:  omega-3 fatty acids-fish oil Take 2 Tabs by mouth daily. lovastatin 20 mg tablet Commonly known as:  MEVACOR  
TAKE 1 TABLET BY MOUTH DAILY  
  
 rivaroxaban 20 mg Tab tablet Commonly known as:  Sisto Cass Take 1 Tab by mouth daily. Please schedule follow up appointment with Dr. Lo Arita for further refills. SIMBRINZA 1-0.2 % Drps Generic drug:  brinzolamide-brimonidine Apply  to eye two (2) times a day. TUMS PO Take  by mouth as needed. verapamil  mg ER capsule Commonly known as:  VERELAN  
TAKE ONE CAPSULE BY MOUTH TWICE A DAY  
  
 VITAMIN C 500 mg tablet Generic drug:  ascorbic acid (vitamin C) Take 1,000 mg by mouth nightly. We Performed the Following REFERRAL TO COLON AND RECTAL SURGERY [REF17 Custom] [address] [place] [address] Follow-up Instructions Return in about 6 months (around 10/26/2018). Referral Information Referral ID Referred By Referred To  
  
 [id number] MAGDALENE MILLER Not Available Visits Status Start Date End Date 1 New Request 4/26/18 4/26/19 If your referral has a status of pending review or denied, additional information will be sent to support the outcome of this decision. Referral ID Referred By Referred To  
 [id number] Josh MILLER MD  
   [address] Phone: [phone] Fax: [phone] Visits Status Start Date End Date 1 New Request 4/26/18 4/26/19 If your referral has a status of pending review or denied, additional information will be sent to support the outcome of this decision. Introducing [facility] & HEALTH SERVICES! Dear [facility]: 
Thank you for requesting a [employer] account. Our records indicate that you already have an active [employer] account. You can access your account anytime at [email or web address] Did you know that you can access your hospital and ER discharge instructions at any time in [employer]? You can also review all of your test results from your hospital stay or ER visit. Additional Information If you have questions, please visit the Frequently Asked Questions section of the [employer] website at [email or web address] Remember, [employer] is NOT to be used for urgent needs. For medical emergencies, dial 911. Now available from your iPhone and Android! Please provide this summary of care documentation to your next provider. Your primary care clinician is listed as MAGDALENE MILLER.  If you have any questions after today's visit, please call [phone].
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
## 2018-04-27 ENCOUNTER — TELEPHONE (OUTPATIENT)
Dept: INTERNAL MEDICINE CLINIC | Age: 68
End: 2018-04-27

## 2018-04-27 LAB
ALBUMIN SERPL-MCNC: 4.3 G/DL (ref 3.6–4.8)
ALP SERPL-CCNC: 58 IU/L (ref 39–117)
ALT SERPL-CCNC: 18 IU/L (ref 0–32)
APPEARANCE UR: CLEAR
AST SERPL-CCNC: 20 IU/L (ref 0–40)
BACTERIA #/AREA URNS HPF: ABNORMAL /[HPF]
BILIRUB DIRECT SERPL-MCNC: 0.09 MG/DL (ref 0–0.4)
BILIRUB SERPL-MCNC: 0.3 MG/DL (ref 0–1.2)
BILIRUB UR QL STRIP: NEGATIVE
BUN SERPL-MCNC: 13 MG/DL (ref 8–27)
BUN/CREAT SERPL: 18 (ref 12–28)
CALCIUM SERPL-MCNC: 9.9 MG/DL (ref 8.7–10.3)
CASTS URNS QL MICRO: ABNORMAL /LPF
CHLORIDE SERPL-SCNC: 102 MMOL/L (ref 96–106)
CHOLEST SERPL-MCNC: 190 MG/DL (ref 100–199)
CO2 SERPL-SCNC: 25 MMOL/L (ref 18–29)
COLOR UR: YELLOW
CREAT SERPL-MCNC: 0.73 MG/DL (ref 0.57–1)
EPI CELLS #/AREA URNS HPF: >10 /HPF
EST. AVERAGE GLUCOSE BLD GHB EST-MCNC: 126 MG/DL
GFR SERPLBLD CREATININE-BSD FMLA CKD-EPI: 84 ML/MIN/1.73
GFR SERPLBLD CREATININE-BSD FMLA CKD-EPI: 97 ML/MIN/1.73
GLUCOSE SERPL-MCNC: 103 MG/DL (ref 65–99)
GLUCOSE UR QL: NEGATIVE
HBA1C MFR BLD: 6 % (ref 4.8–5.6)
HDLC SERPL-MCNC: 54 MG/DL
HGB UR QL STRIP: ABNORMAL
KETONES UR QL STRIP: NEGATIVE
LDLC SERPL CALC-MCNC: 83 MG/DL (ref 0–99)
LEUKOCYTE ESTERASE UR QL STRIP: NEGATIVE
MICRO URNS: ABNORMAL
MUCOUS THREADS URNS QL MICRO: PRESENT
NITRITE UR QL STRIP: NEGATIVE
PH UR STRIP: 7 [PH] (ref 5–7.5)
POTASSIUM SERPL-SCNC: 5.3 MMOL/L (ref 3.5–5.2)
PROT SERPL-MCNC: 6.9 G/DL (ref 6–8.5)
PROT UR QL STRIP: NEGATIVE
RBC #/AREA URNS HPF: ABNORMAL /HPF
SODIUM SERPL-SCNC: 147 MMOL/L (ref 134–144)
SP GR UR: 1.02 (ref 1–1.03)
TRIGL SERPL-MCNC: 264 MG/DL (ref 0–149)
UROBILINOGEN UR STRIP-MCNC: 0.2 MG/DL (ref 0.2–1)
VLDLC SERPL CALC-MCNC: 53 MG/DL (ref 5–40)
WBC #/AREA URNS HPF: ABNORMAL /HPF

## 2018-04-27 NOTE — TELEPHONE ENCOUNTER
Called [facility] - spoke with Osmel Finn in regards to setting pt up for home PT. She states we need to fax pt's demographics, office note for face to face and H&P and referral order. Once received - they will review and call pt to schedule home PT. Will forward to MD.    Will fax to [phone] once recent office note is completed.

## 2018-04-29 DIAGNOSIS — I48.20 CHRONIC ATRIAL FIBRILLATION (HCC): ICD-10-CM

## 2018-04-29 DIAGNOSIS — I10 ESSENTIAL HYPERTENSION: ICD-10-CM

## 2018-04-30 RX ORDER — RIVAROXABAN 20 MG/1
TABLET, FILM COATED ORAL
Qty: 90 TAB | Refills: 2 | Status: SHIPPED | OUTPATIENT
Start: 2018-04-30 | End: 2018-07-25 | Stop reason: SDUPTHER

## 2018-04-30 NOTE — TELEPHONE ENCOUNTER
As requested - sent pt's demographics, office note and referral order [facility] fax [phone]. Confirmation received.

## 2018-05-09 ENCOUNTER — TELEPHONE (OUTPATIENT)
Dept: INTERNAL MEDICINE CLINIC | Age: 68
End: 2018-05-09

## 2018-05-09 NOTE — TELEPHONE ENCOUNTER
Spoke with pt - advised her Kristen Espinosa with [facility] called requesting order for  for her. MD wanted to make sure she was ok with this. She said yes - they are going to help her with transportation issues. Spoke with Kristen Espinosa with [facility] - advised MD has approved request for .

## 2018-05-09 NOTE — TELEPHONE ENCOUNTER
Spoke with [place] with [facility]. She saw pt today - would like an order for  - pt extremely depressed and has transportation issues.  Will forward to MD.

## 2018-06-11 ENCOUNTER — TELEPHONE (OUTPATIENT)
Dept: SLEEP MEDICINE | Age: 68
End: 2018-06-11

## 2018-06-11 NOTE — TELEPHONE ENCOUNTER
Valeriy Benson called from [facility] stating that the patient called with issues with her PAP device. She wanted to know what is holding up the patient with getting a new device and how the [employer] would be able to help the patient. I explained to her that the patient is responsible for a $100 fee and then the CPAP Assistance Program will get her set up on her device. She stated that she is going to get in contact with her staff to see what they can do for the patient. She can be reached at [phone].

## 2018-06-12 ENCOUNTER — TELEPHONE (OUTPATIENT)
Dept: SLEEP MEDICINE | Age: 68
End: 2018-06-12

## 2018-06-12 DIAGNOSIS — G47.33 OBSTRUCTIVE SLEEP APNEA (ADULT) (PEDIATRIC): Primary | ICD-10-CM

## 2018-06-15 ENCOUNTER — TELEPHONE (OUTPATIENT)
Dept: SLEEP MEDICINE | Age: 68
End: 2018-06-15

## 2018-06-18 ENCOUNTER — OFFICE VISIT (OUTPATIENT)
Dept: SLEEP MEDICINE | Age: 68
End: 2018-06-18

## 2018-06-18 VITALS
HEART RATE: 83 BPM | BODY MASS INDEX: 37.95 KG/M2 | OXYGEN SATURATION: 96 % | WEIGHT: 201 LBS | SYSTOLIC BLOOD PRESSURE: 142 MMHG | DIASTOLIC BLOOD PRESSURE: 83 MMHG | HEIGHT: 61 IN

## 2018-06-18 DIAGNOSIS — G47.33 OSA (OBSTRUCTIVE SLEEP APNEA): Primary | ICD-10-CM

## 2018-06-18 NOTE — PROGRESS NOTES
[address] [facility] [place]. [place], [address]  Tel.  [phone]  Fax. 100 [facility] 60  [address] [facility], [address] [facility]  Tel.  [phone]  Fax. [phone] [address] [facility] [address]  Tel.  [phone]  Fax. [phone]       S>Michelle Dale is a 69 y.o. female seen today to receive a donated positive airway pressure machine for regular home use. · Physician orders were reviewed and pressure was set at 10 cmH2O.  · Mask evaluation was performed. She was likewise informed on the use of her mask. · The patient demonstrated good understanding of the positive airway pressure device. O>    Visit Vitals    /83 (BP 1 Location: Right arm, BP Patient Position: Sitting)    Pulse 83    Ht 5' 1\" (1.549 m)    Wt 201 lb (91.2 kg)    SpO2 96%    BMI 37.98 kg/m2         A>  No diagnosis found. P>  · Follow-up Disposition: Not on File  · General information regarding operations and maintenance of the device was provided. · She was provided information on sleep apnea including coresponding risk factors and the importance of proper treatment. · Provided patient with troubleshooting and device cleaning/maintainence information. · She was advised this donated device is a one time offer and referred to other sources for additional resupply and equipment. · She was asked to contact our office for any problems regarding her PAP therapy.

## 2018-06-20 ENCOUNTER — TELEPHONE (OUTPATIENT)
Dept: SLEEP MEDICINE | Age: 68
End: 2018-06-20

## 2018-06-20 DIAGNOSIS — G47.33 OBSTRUCTIVE SLEEP APNEA (ADULT) (PEDIATRIC): Primary | ICD-10-CM

## 2018-06-20 NOTE — TELEPHONE ENCOUNTER
Patient called requesting a call back from [address]. Advised patient I would have him give her a call back tomorrow, she requests it be after 1:00 pm. She can be reached at [phone].

## 2018-06-21 NOTE — TELEPHONE ENCOUNTER
Patient would like a prescription for a new device that she can purchase through [facility] Address:  [address] [place] [address] 97 T [phone] F [phone] faxed ATTN:  Severo Chou. They have Dreamstation and Rony Skeans available. Lemuel Perez will assist with this purchase. Patient received CPAP device from CPAP Assistance Program.  It blows cold air and makes her nose drips all day long. Helping her breath but prefers to get a new machine with a humidifier. Patient reminded of appointment next week. She confirmed. Transportation will be provided by [employer]. She is hoping to get her new machine before she sees Dr. David Smalls.

## 2018-06-26 ENCOUNTER — DOCUMENTATION ONLY (OUTPATIENT)
Dept: SLEEP MEDICINE | Age: 68
End: 2018-06-26

## 2018-06-27 ENCOUNTER — DOCUMENTATION ONLY (OUTPATIENT)
Dept: SLEEP MEDICINE | Age: 68
End: 2018-06-27

## 2018-06-27 ENCOUNTER — OFFICE VISIT (OUTPATIENT)
Dept: SLEEP MEDICINE | Age: 68
End: 2018-06-27

## 2018-06-27 VITALS
OXYGEN SATURATION: 96 % | BODY MASS INDEX: 37.76 KG/M2 | DIASTOLIC BLOOD PRESSURE: 70 MMHG | HEIGHT: 61 IN | SYSTOLIC BLOOD PRESSURE: 148 MMHG | HEART RATE: 76 BPM | WEIGHT: 200 LBS

## 2018-06-27 DIAGNOSIS — G47.33 OBSTRUCTIVE SLEEP APNEA (ADULT) (PEDIATRIC): Primary | ICD-10-CM

## 2018-06-27 NOTE — PROGRESS NOTES
Armando Hardwick and spoke with Beck Petit to cancel the order for the Dreamstation device. Also advised to follow through with the Airsense 10 order and then faxed that order over.

## 2018-06-27 NOTE — PROGRESS NOTES
[address] [facility] [address] [place]. [place], [address]  Tel.  [phone]  Fax. 100 [facility] 60  [place], [address]  Tel.  [phone]  Fax. [phone] [address]  Tel.  [phone]  Fax. [phone]     S>Mary K Eldred Bloch is a 69 y.o. female seen for a positive airway pressure follow-up. She reports no problems using the device. The following problems are identified:    Drowsiness no Problems exhaling no   Snoring no Forget to put on no   Mask Comfortable yes Can't fall asleep no   Dry Mouth No, but current machine blows cold since it does not have a humidifier. She finds this very uncomfortable Mask falls off no   Air Leaking no Frequent awakenings no     Download reviewed. She admits that her sleep has improved. .     Allergies   Allergen Reactions    Latex Other (comments)     \"Trouble breathing\"    Cardizem [Diltiazem Hcl] Unknown (comments)    Ceclor [Cefaclor] Unknown (comments)    Coffee (Coffea Arabica) Other (comments)     Causes swollen neck glands    Also states allergy to tea with same reaction    Flexeril [Cyclobenzaprine] Other (comments)     insomnia    Levaquin [Levofloxacin] Other (comments)     Pt does not want medication since reading insert. - tendon pain    Ludiomil Other (comments)     depression    Macrodantin [Nitrofurantoin Macrocrystalline] Unknown (comments)    Other Medication Other (comments)     Side effects to some BP meds. Unsure of names. Pt will call Dr. Madrid Bones office and have them fax the information. Information received and entered into chart. Allergic to syntex.  Plendil [Felodipine] Other (comments)     Flushing, chest pain    Prinivil [Lisinopril] Unknown (comments)    Procardia [Nifedipine] Other (comments)     Burning feeling in brain. XL    Provera [Medroxyprogesterone] Other (comments)     Finger pain    Shellfish Derived Unknown (comments)     Allergic to shrimp.     Singulair [Montelukast] Other (comments)     faintness    Tenoretic 100 [Atenolol-Chlorthalidone] Other (comments)     PO - headache    Tenormin [Atenolol] Unknown (comments)    Vistaril [Hydroxyzine Pamoate] Unknown (comments)    Zocor [Simvastatin] Other (comments)     Leg weakness       She has a current medication list which includes the following prescription(s): xarelto, lovastatin, verapamil er, catapres-tts-3, benazepril, ascorbic acid (vitamin c), cholecalciferol (vitamin d3), omega-3 fatty acids-fish oil, calcium carbonate, and brinzolamide-brimonidine. .      She  has a past medical history of Aneurysm ([facility] Arrhythmia; Arthritis; Depression; Diabetes ([facility] Hypertension; Ill-defined condition; Ill-defined condition; Other ill-defined conditions(799.89); Other ill-defined conditions(799.89); Other ill-defined conditions(799.89); Other ill-defined conditions(799.89); Other ill-defined conditions(799.89); Other ill-defined conditions(799.89); Other ill-defined conditions(799.89); Other ill-defined conditions(799.89) (1994); Other ill-defined conditions(799.89); Other ill-defined conditions(799.89); Other ill-defined conditions(799.89); Other ill-defined conditions(799.89); Other ill-defined conditions(799.89); Psychiatric disorder; Scoliosis; Sleep apnea; and Unspecified sleep apnea. Charlo Sleepiness Score: 4   and Modified F.O.S.Q. Score Total / 2: 19.5      O>    Visit Vitals    /70 (BP 1 Location: Right arm, BP Patient Position: Sitting)    Pulse 76    Ht 5' 1\" (1.549 m)    Wt 200 lb (90.7 kg)    SpO2 96%    BMI 37.79 kg/m2           General:   Alert, oriented, not in distress   Neck:   No JVD    Chest/Lungs:  symetrical lung expansion , no accessory muscle use    Extremities:  no obvious rashes , negative edema    Neuro:  No focal deficits ; No obvious tremor    Psych:  Normal affect ,  Normal countenance ;         A>    ICD-10-CM ICD-9-CM    1.  Obstructive sleep apnea (adult) (pediatric) G47.33 327.23 AMB SUPPLY ORDER     AHI = 11(2014). On CPAP :  10 cmH2O. Compliant:      yes    Therapeutic Response:  Positive    P>      * Follow-up Disposition:  Return in about 1 year (around 6/27/2019). she will continue on her current pressure settings. Order sent for airsense 10. She does not want the dreamstation. * She was asked to contact our office for any problems regarding PAP therapy. * Counseling was provided regarding the importance of regular PAP use and on proper sleep hygiene and safe driving. * Re-enforced proper and regular cleaning for the device.         Jenny Nelson MD  Diplomate in Sleep Medicine  [facility]

## 2018-06-27 NOTE — PATIENT INSTRUCTIONS
[address] [facility] [place]. [place], [address]  Tel.  [phone]  Fax. 100 [facility] 60  [place], [address] [facility]  Tel.  [phone]  Fax. [phone] [address]  Tel.  [phone]  Fax. [phone]     Learning About CPAP for Sleep Apnea  What is CPAP? CPAP is a small machine that you use at home every night while you sleep. It increases air pressure in your throat to keep your airway open. When you have sleep apnea, this can help you sleep better so you feel much better. CPAP stands for \"continuous positive airway pressure. \"  The CPAP machine will have one of the following:  · A mask that covers your nose and mouth  · Prongs that fit into your nose  · A mask that covers your nose only, the most common type. This type is called NCPAP. The N stands for \"nasal.\"  Why is it done? CPAP is usually the best treatment for obstructive sleep apnea. It is the first treatment choice and the most widely used. Your doctor may suggest CPAP if you have:  · Moderate to severe sleep apnea. · Sleep apnea and coronary artery disease (CAD) or heart failure. How does it help? · CPAP can help you have more normal sleep, so you feel less sleepy and more alert during the daytime. · CPAP may help keep heart failure or other heart problems from getting worse. · NCPAP may help lower your blood pressure. · If you use CPAP, your bed partner may also sleep better because you are not snoring or restless. What are the side effects? Some people who use CPAP have:  · A dry or stuffy nose and a sore throat. · Irritated skin on the face. · Sore eyes. · Bloating. If you have any of these problems, work with your doctor to fix them. Here are some things you can try:  · Be sure the mask or nasal prongs fit well. · See if your doctor can adjust the pressure of your CPAP. · If your nose is dry, try a humidifier.   · If your nose is runny or stuffy, try decongestant medicine or a steroid nasal spray. If these things do not help, you might try a different type of machine. Some machines have air pressure that adjusts on its own. Others have air pressures that are different when you breathe in than when you breathe out. This may reduce discomfort caused by too much pressure in your nose. Where can you learn more? Go to M-DISC.be  Enter Carlos Benedict in the search box to learn more about \"Learning About CPAP for Sleep Apnea. \"   © 8804-5942 Healthwise, Incorporated. Care instructions adapted under license by [facility] [employer] (which disclaims liability or warranty for this information). This care instruction is for use with your licensed healthcare professional. If you have questions about a medical condition or this instruction, always ask your healthcare professional. [address] any warranty or liability for your use of this information. Content Version: 8.6.97081; Last Revised: January 11, 2010  PROPER SLEEP HYGIENE    What to avoid  · Do not have drinks with caffeine, such as coffee or black tea, for 8 hours before bed. · Do not smoke or use other types of tobacco near bedtime. Nicotine is a stimulant and can keep you awake. · Avoid drinking alcohol late in the evening, because it can cause you to wake in the middle of the night. · Do not eat a big meal close to bedtime. If you are hungry, eat a light snack. · Do not drink a lot of water close to bedtime, because the need to urinate may wake you up during the night. · Do not read or watch TV in bed. Use the bed only for sleeping and sexual activity. What to try  · Go to bed at the same time every night, and wake up at the same time every morning. Do not take naps during the day. · Keep your bedroom quiet, dark, and cool. · Get regular exercise, but not within 3 to 4 hours of your bedtime. .  · Sleep on a comfortable pillow and mattress.   · If watching the clock makes you anxious, turn it facing away from you so you cannot see the time. · If you worry when you lie down, start a worry book. Well before bedtime, write down your worries, and then set the book and your concerns aside. · Try meditation or other relaxation techniques before you go to bed. · If you cannot fall asleep, get up and go to another room until you feel sleepy. Do something relaxing. Repeat your bedtime routine before you go to bed again. · Make your house quiet and calm about an hour before bedtime. Turn down the lights, turn off the TV, log off the computer, and turn down the volume on music. This can help you relax after a busy day. Drowsy Driving: The [employer] cites drowsiness as a causing factor in more than 786,059 police reported crashes annually, resulting in 76,000 injuries and 1,500 deaths. Other surveys suggest 55% of people polled have driven while drowsy in the past year, 23% had fallen asleep but not crashed, 3% crashed, and 2% had and accident due to drowsy driving. Who is at risk? Young Drivers: One study of drowsy driving accidents states that 55% of the drivers were under 25 years. Of those, 75% were male. Shift Workers and Travelers: People who work overnight or travel across time zones frequently are at higher risk of experiencing Circadian Rhythm Disorders. They are trying to work and function when their body is programed to sleep. Sleep Deprived: Lack of sleep has a serious impact on your ability to pay attention or focus on a task. Consistently getting less than the average of 8 hours your body needs creates partial or cumulative sleep deprivation. Untreated Sleep Disorders: Sleep Apnea, Narcolepsy, R.L.S., and other sleep disorders (untreated) prevent a person from getting enough restful sleep. This leads to excessive daytime sleepiness and increases the risk for drowsy driving accidents by up to 7 times.   Medications / Alcohol: Even over the counter medications can cause drowsiness. Medications that impair a drivers attention should have a warning label. Alcohol naturally makes you sleepy and on its own can cause accidents. Combined with excessive drowsiness its effects are amplified. Signs of Drowsy Driving:   * You don't remember driving the last few miles   * You may drift out of your wilma   * You are unable to focus and your thoughts wander   * You may yawn more often than normal   * You have difficulty keeping your eyes open / nodding off   * Missing traffic signs, speeding, or tailgating  Prevention-   Good sleep hygiene, lifestyle and behavioral choices have the most impact on drowsy driving. There is no substitute for sleep and the average person requires 8 hours nightly. If you find yourself driving drowsy, stop and sleep. Consider the sleep hygiene tips provided during your visit as well. Medication Refill Policy: Refills for all medications require 1 week advance notice. Please have your pharmacy fax a refill request. We are unable to fax, or call in \"controled substance\" medications and you will need to pick these prescriptions up from our office. Zuse Activation    Thank you for requesting access to Zuse. Please follow the instructions below to securely access and download your online medical record. Zuse allows you to send messages to your doctor, view your test results, renew your prescriptions, schedule appointments, and more. How Do I Sign Up? 1. In your internet browser, go to [email or web address]. 2. Click on the First Time User? Click Here link in the Sign In box. You will see the New Member Sign Up page. 3. Enter your Zuse Access Code exactly as it appears below. You will not need to use this code after youve completed the sign-up process. If you do not sign up before the expiration date, you must request a new code. Zuse Access Code:  Activation code not generated  Current [employer]ner Status: Active (This is the date your [employer]ner access code will )    4. Enter the last four digits of your Social Security Number (xxxx) and Date of Birth (mm/dd/yyyy) as indicated and click Submit. You will be taken to the next sign-up page. 5. Create a Xiaoi Robertt ID. This will be your Xiaoi Robertt login ID and cannot be changed, so think of one that is secure and easy to remember. 6. Create a [employer] password. You can change your password at any time. 7. Enter your Password Reset Question and Answer. This can be used at a later time if you forget your password. 8. Enter your e-mail address. You will receive e-mail notification when new information is available in [address] 9. Click Sign Up. You can now view and download portions of your medical record. 10. Click the Download Summary menu link to download a portable copy of your medical information. Additional Information    If you have questions, please call [phone]. Remember, [employer] is NOT to be used for urgent needs. For medical emergencies, dial 911.

## 2018-07-05 RX ORDER — CLONIDINE 0.3 MG/D
PATCH TRANSDERMAL
Qty: 4 PATCH | Refills: 3 | Status: SHIPPED | OUTPATIENT
Start: 2018-07-05 | End: 2018-10-12 | Stop reason: SDUPTHER

## 2018-07-25 DIAGNOSIS — I48.20 CHRONIC ATRIAL FIBRILLATION (HCC): ICD-10-CM

## 2018-07-25 DIAGNOSIS — I10 ESSENTIAL HYPERTENSION: ICD-10-CM

## 2018-07-25 NOTE — TELEPHONE ENCOUNTER
Requested Prescriptions     Signed Prescriptions Disp Refills    rivaroxaban (XARELTO) 20 mg tab tablet 90 Tab 3     Sig: TAKE 1 TABLET BY MOUTH EVERY DAY -NEED APPT     Authorizing Provider: Rainer Little     Ordering User: Daisy Mercer    Per Dr. Jackie Antonio verbal orders

## 2018-07-25 NOTE — TELEPHONE ENCOUNTER
Pharmacy verified.      Requested Prescriptions     Pending Prescriptions Disp Refills    rivaroxaban (XARELTO) 20 mg tab tablet 90 Tab 2     Thanks

## 2018-07-26 RX ORDER — BENAZEPRIL HYDROCHLORIDE 10 MG/1
TABLET ORAL
Qty: 60 TAB | Refills: 2 | Status: SHIPPED | OUTPATIENT
Start: 2018-07-26 | End: 2018-12-10 | Stop reason: SDUPTHER

## 2018-09-27 NOTE — PROGRESS NOTES
Advised pt her labs look great overall. Continue current regimen of prescription and / or OTC medications. She appears to have uti, with microscopic blood. MD wants home health to collect a urine culture. Once collected she needs to start septra ds x bid 7 days - sent to pharmacy. Her xrays show hip arthritis R>L, minimal knee arthritis.
Attempted to call pt - no answer - can not leave message - will continue to reach pt.
Call- Labs look great overall , Continue current regimen of prescription and / or OTC medications   - she appears to have uti, with microscopic blood. Have home health go out to collect a urine culture.  Once collected, start septra ds x bid 7 d  - xrays show hip arthritis R>L, minimal knee arthritis
[address] ([phone]) - TCB for Nile Drown in regards to MD request to obtain urine culture.
Sherlyn ZAMBRANO phoned into pharmacy - e-scribe issues.
Spoke with Flori Low with [facility] - advised her MD requesting home health nurse to obtain urine culture tomorrow. She will review and call me back.
Spoke with Sophy Hoffman yesterday in regards to MD request to obtain urine culture - vo given. Called today to follow up on this - spoke with Cass Ruiz - she said she wrote order for UA should be done today. Advised her pt had abnormal UA and MD wanted urine culture. She will correct order.  Will forward to MD.
normal shape/ROM intact

## 2018-10-12 RX ORDER — CLONIDINE 0.3 MG/D
PATCH TRANSDERMAL
Qty: 4 PATCH | Refills: 0 | Status: SHIPPED | COMMUNITY
Start: 2018-10-12 | End: 2018-11-15 | Stop reason: SDUPTHER

## 2018-11-05 ENCOUNTER — DOCUMENTATION ONLY (OUTPATIENT)
Dept: SLEEP MEDICINE | Age: 68
End: 2018-11-05

## 2018-11-05 NOTE — PROGRESS NOTES
Received message from [facility] office that patient called on 11/2/18 re: broken PAP. She expressed concern that [facility] will not deliver or service her at home. Patient requested a call back from manager. 11:28am  Attempted to reach patient at home. No answer and unable to leave a voicemail as machine asked for an access code.

## 2018-11-12 ENCOUNTER — TELEPHONE (OUTPATIENT)
Dept: INTERNAL MEDICINE CLINIC | Age: 68
End: 2018-11-12

## 2018-11-13 DIAGNOSIS — R73.01 IFG (IMPAIRED FASTING GLUCOSE): ICD-10-CM

## 2018-11-13 DIAGNOSIS — E78.2 MIXED HYPERLIPIDEMIA: Primary | ICD-10-CM

## 2018-11-15 ENCOUNTER — TELEPHONE (OUTPATIENT)
Dept: CARDIOLOGY CLINIC | Age: 68
End: 2018-11-15

## 2018-11-15 RX ORDER — CLONIDINE 0.3 MG/D
PATCH TRANSDERMAL
Qty: 4 PATCH | Refills: 0 | Status: SHIPPED | OUTPATIENT
Start: 2018-11-15 | End: 2018-12-23 | Stop reason: SDUPTHER

## 2018-11-15 NOTE — TELEPHONE ENCOUNTER
Called patient. Phone rang. No voicemail set up so unable to leave message. Will try to call patient back.

## 2018-11-29 ENCOUNTER — TELEPHONE (OUTPATIENT)
Dept: SLEEP MEDICINE | Age: 68
End: 2018-11-29

## 2018-11-29 NOTE — TELEPHONE ENCOUNTER
On 11/28/18, patient called saying she does not wish to use her device from the [facility] [facility] because it does not have humidification. She said she wants to purchase a new machine from [facility] but does not wish to go there for the setup. She asked our office to call them to see if she could have it shipped here for setup and come here to meet with our technologists to receive it. Spoke with Nohelia at [facility] who states they absolutely can do this for her, she will just need to pay the extra for shipping. She said to simply send over the order and a copy of her sleep study. Advised oNhelia that Mrs. King plans to call today (11/29/18) to make the purchase. She said to have her ask for [facility] who handles CPAP. Faxed the order and sleep study to [phone], and wrote letter explaining patient wants machine shipped to us once purchased by Ms. King over the phone, who it a shut-in. Called Ms. Randene Leventhal and informed her that they will ship for an extra cost and she plans to call them today to purchase.      [facility] P: [phone] F: [phone]

## 2018-11-30 ENCOUNTER — TELEPHONE (OUTPATIENT)
Dept: SLEEP MEDICINE | Age: 68
End: 2018-11-30

## 2018-11-30 DIAGNOSIS — G47.33 OBSTRUCTIVE SLEEP APNEA (ADULT) (PEDIATRIC): Primary | ICD-10-CM

## 2018-11-30 NOTE — TELEPHONE ENCOUNTER
Patient called today asking if she could have an order for a Dream Station. She states she will receive from [address] [place] [address]. She feels that this is the machine you prefer her to be on. They will sell to her for the same price as the [employer] for her, ordered initially, only the Dreamstation will not have automode as an option. Is this okay for patient? If so, she would like the order submitted to [facility] asap.

## 2018-12-10 RX ORDER — BENAZEPRIL HYDROCHLORIDE 10 MG/1
TABLET ORAL
Qty: 60 TAB | Refills: 0 | Status: SHIPPED | OUTPATIENT
Start: 2018-12-10 | End: 2019-03-24 | Stop reason: SDUPTHER

## 2018-12-20 ENCOUNTER — OFFICE VISIT (OUTPATIENT)
Dept: SLEEP MEDICINE | Age: 68
End: 2018-12-20

## 2018-12-20 DIAGNOSIS — G47.33 OSA (OBSTRUCTIVE SLEEP APNEA): Primary | ICD-10-CM

## 2018-12-20 NOTE — PROGRESS NOTES
[facility] [place]. [place], [address]  Tel.  [phone]  Fax. [facility] [address]  [place], [address] [facility]  Tel.  [phone]  Fax. [phone] [address] [place]   Tel.  [phone]  Fax. [phone]       S>Michelle Peralta is a 77 y.o. female seen today to receive a  positive airway pressure machine she received from [facility], for regular home use. · Physician orders were reviewed and pressure was set at 10 cmH2O.  · Patient will continue to use her current mask. · The patient demonstrated good understanding of the positive airway pressure device. O>    There were no vitals taken for this visit. A>  1. BERNIE (obstructive sleep apnea)      AHI = 11.      P>  · Follow-up Disposition: Not on File  · General information regarding operations and maintenance of the device was provided. · She was provided information on sleep apnea including coresponding risk factors and the importance of proper treatment. · Provided patient with troubleshooting and device cleaning/maintainence information. · She was advised this donated device is a one time offer and referred to other sources for additional resupply and equipment. · She was asked to contact our office for any problems regarding her PAP therapy.

## 2018-12-26 DIAGNOSIS — I48.20 CHRONIC ATRIAL FIBRILLATION (HCC): ICD-10-CM

## 2018-12-26 DIAGNOSIS — I10 ESSENTIAL HYPERTENSION: ICD-10-CM

## 2018-12-26 NOTE — TELEPHONE ENCOUNTER
Pharmacy verified     30 day supply with refills     Patient is running low on this medication.      Thank you, Smita Perez

## 2018-12-26 NOTE — TELEPHONE ENCOUNTER
Requested Prescriptions     Signed Prescriptions Disp Refills    rivaroxaban (XARELTO) 20 mg tab tablet 30 Tab 0     Sig: TAKE 1 TABLET BY MOUTH EVERY DAY -NEED APPT     Authorizing Provider: Ferny Skill     Ordering User: Arlet Menezes    Per Dr. Nick Mccullough verbal orders     Note made: patient needs appointment.

## 2018-12-27 DIAGNOSIS — I48.20 CHRONIC ATRIAL FIBRILLATION (HCC): ICD-10-CM

## 2018-12-27 DIAGNOSIS — I10 ESSENTIAL HYPERTENSION: ICD-10-CM

## 2019-01-23 ENCOUNTER — TELEPHONE (OUTPATIENT)
Dept: INTERNAL MEDICINE CLINIC | Age: 69
End: 2019-01-23

## 2019-01-24 ENCOUNTER — HOSPITAL ENCOUNTER (OUTPATIENT)
Dept: LAB | Age: 69
Discharge: HOME | End: 2019-01-24
Payer: MEDICARE

## 2019-01-24 ENCOUNTER — OFFICE VISIT (OUTPATIENT)
Dept: INTERNAL MEDICINE CLINIC | Age: 69
End: 2019-01-24

## 2019-01-24 ENCOUNTER — OFFICE VISIT (OUTPATIENT)
Dept: CARDIOLOGY CLINIC | Age: 69
End: 2019-01-24

## 2019-01-24 VITALS
BODY MASS INDEX: 38.51 KG/M2 | WEIGHT: 204 LBS | HEIGHT: 61 IN | TEMPERATURE: 98 F | RESPIRATION RATE: 16 BRPM | OXYGEN SATURATION: 95 % | HEART RATE: 95 BPM | DIASTOLIC BLOOD PRESSURE: 80 MMHG | SYSTOLIC BLOOD PRESSURE: 124 MMHG

## 2019-01-24 VITALS
DIASTOLIC BLOOD PRESSURE: 80 MMHG | RESPIRATION RATE: 16 BRPM | SYSTOLIC BLOOD PRESSURE: 124 MMHG | HEART RATE: 70 BPM | HEIGHT: 61 IN | BODY MASS INDEX: 38.86 KG/M2 | WEIGHT: 205.8 LBS

## 2019-01-24 DIAGNOSIS — R73.01 IFG (IMPAIRED FASTING GLUCOSE): ICD-10-CM

## 2019-01-24 DIAGNOSIS — F41.9 ANXIETY: ICD-10-CM

## 2019-01-24 DIAGNOSIS — M41.25 OTHER IDIOPATHIC SCOLIOSIS, THORACOLUMBAR REGION: ICD-10-CM

## 2019-01-24 DIAGNOSIS — I48.20 CHRONIC ATRIAL FIBRILLATION (HCC): Primary | ICD-10-CM

## 2019-01-24 DIAGNOSIS — E66.01 SEVERE OBESITY (BMI 35.0-39.9) WITH COMORBIDITY (HCC): ICD-10-CM

## 2019-01-24 DIAGNOSIS — I10 ESSENTIAL HYPERTENSION: ICD-10-CM

## 2019-01-24 DIAGNOSIS — E78.2 MIXED HYPERLIPIDEMIA: ICD-10-CM

## 2019-01-24 DIAGNOSIS — G47.33 OSA (OBSTRUCTIVE SLEEP APNEA): ICD-10-CM

## 2019-01-24 PROCEDURE — 80061 LIPID PANEL: CPT

## 2019-01-24 PROCEDURE — 81001 URINALYSIS AUTO W/SCOPE: CPT

## 2019-01-24 PROCEDURE — 80048 BASIC METABOLIC PNL TOTAL CA: CPT

## 2019-01-24 PROCEDURE — 80076 HEPATIC FUNCTION PANEL: CPT

## 2019-01-24 PROCEDURE — 83036 HEMOGLOBIN GLYCOSYLATED A1C: CPT

## 2019-01-24 PROCEDURE — 36415 COLL VENOUS BLD VENIPUNCTURE: CPT

## 2019-01-24 NOTE — PROGRESS NOTES
HISTORY OF PRESENT ILLNESS  Marcos Krishnamurthy is a 77 y.o. female. HPI Subjective: [facility] is seen today for follow up of hypertension and other problems. 1. Hyperlipidemia, IFG. She is due for routine labs. 2. Chronic atrial fibrillation, up to date with cardiology. In fact, she saw Dr. Emerson Coburn today. No changes in her regimen. 3. Hypertension. Received a message from her pharmacy today that her Verapamil is on backorder. She has a good surplus supply. I will check with Dr. Emerson Coburn regarding an alternative, but I think we can wait a week or so to see if the medication comes in before changing her regimen. 4. Insomnia. Apparently she awakens every hour. She does not feel the CPAP apparatus works properly. I have asked her to follow up with Dr. Ruth Ann Hamilton, her sleep specialist.    Current Outpatient Medications   Medication Sig    rivaroxaban (XARELTO) 20 mg tab tablet TAKE 1 TABLET BY MOUTH EVERY DAY    CATAPRES-TTS-3 0.3 mg/24 hr APPLY 1 PATCH EVERY 8 DAYS AS DIRECTED    benazepril (LOTENSIN) 10 mg tablet TAKE ONE TABLET BY MOUTH TWICE A DAY    lovastatin (MEVACOR) 20 mg tablet TAKE ONE TABLET BY MOUTH DAILY    verapamil ER (VERELAN) 120 mg ER capsule TAKE ONE CAPSULE BY MOUTH TWICE A DAY    brinzolamide-brimonidine (SIMBRINZA) 1-0.2 % drps Apply  to eye two (2) times a day.  ascorbic acid (VITAMIN C) 500 mg tablet Take 1,000 mg by mouth nightly.  Cholecalciferol, Vitamin D3, 3,000 unit tab Take 3,000 Units by mouth daily.  CALCIUM CARBONATE (TUMS PO) Take  by mouth as needed. No current facility-administered medications for this visit. Review of Systems   Constitutional: Negative for weight loss. Respiratory: Negative. Cardiovascular: Negative for chest pain, palpitations, leg swelling and PND. Musculoskeletal: Negative for myalgias. Neurological: Negative for focal weakness. Psychiatric/Behavioral: Positive for depression.  The patient is nervous/anxious and has insomnia. Physical Exam   Constitutional: She appears well-nourished. Neck: Carotid bruit is not present. Cardiovascular: Normal rate and regular rhythm. Exam reveals no gallop and no friction rub. No murmur heard. Pulmonary/Chest: Effort normal and breath sounds normal. No respiratory distress. Musculoskeletal: She exhibits no edema. Nursing note and vitals reviewed. ASSESSMENT and PLAN  Diagnoses and all orders for this visit:    1. Chronic atrial fibrillation [facility])- See cardiologist as directed. 2. Mixed hyperlipidemia- Check lipid panel and appropriate studies to rule out med side effects now and in 6 months- high risk med management. 3. IFG (impaired fasting glucose)- Follow off treatment     4. Anxiety- Follow off treatment     5.  Other idiopathic scoliosis, thoracolumbar region- supportive

## 2019-01-24 NOTE — PROGRESS NOTES
HISTORY OF PRESENT ILLNESS  Shalonda Carlisle is a 77 y.o. female     SUMMARY:   Problem List  Date Reviewed: 1/24/2019          Codes Class Noted    Severe obesity (BMI 35.0-39. 9) with comorbidity ([address] ICD-10-CM: E66.01  ICD-9-CM: 278.01  4/26/2018        Other idiopathic scoliosis, thoracolumbar region ICD-10-CM: M41.25  ICD-9-CM: 737.30  4/26/2018        Anxiety ICD-10-CM: F41.9  ICD-9-CM: 300.00  3/30/2017        Active advance directive on file ICD-10-CM: Z78.9  ICD-9-CM: V49.89  8/1/2016        Chronic atrial fibrillation (HCC) (Chronic) ICD-10-CM: I48.2  ICD-9-CM: 427.31  10/7/2015        Mixed hyperlipidemia ICD-10-CM: E78.2  ICD-9-CM: 272.2  10/7/2015        Microscopic hematuria ICD-10-CM: R31.29  ICD-9-CM: 599.72  10/7/2015        IFG (impaired fasting glucose) ICD-10-CM: R73.01  ICD-9-CM: 790.21  10/7/2015        Major depression ICD-10-CM: F32.9  ICD-9-CM: 296.20  4/6/2015        BERNIE (obstructive sleep apnea) ICD-10-CM: G47.33  ICD-9-CM: 327.23  11/17/2014        Cerebral aneurysm, nonruptured ICD-10-CM: I67.1  ICD-9-CM: 437.3  8/5/2014        Abnormal involuntary movements(781.0) ICD-10-CM: R25.9  ICD-9-CM: 781.0  8/4/2014        Disturbance of skin sensation ICD-10-CM: R20.9  ICD-9-CM: 782.0  8/3/2014        Sleep apnea ICD-10-CM: G47.30  ICD-9-CM: 780.57  5/13/2014              Current Outpatient Medications on File Prior to Visit   Medication Sig    rivaroxaban (XARELTO) 20 mg tab tablet TAKE 1 TABLET BY MOUTH EVERY DAY -NEED APPT    CATAPRES-TTS-3 0.3 mg/24 hr APPLY 1 PATCH EVERY 8 DAYS AS DIRECTED    benazepril (LOTENSIN) 10 mg tablet TAKE ONE TABLET BY MOUTH TWICE A DAY    lovastatin (MEVACOR) 20 mg tablet TAKE ONE TABLET BY MOUTH DAILY    verapamil ER (VERELAN) 120 mg ER capsule TAKE ONE CAPSULE BY MOUTH TWICE A DAY    brinzolamide-brimonidine (SIMBRINZA) 1-0.2 % drps Apply  to eye two (2) times a day.  ascorbic acid (VITAMIN C) 500 mg tablet Take 1,000 mg by mouth nightly.     Cholecalciferol, Vitamin D3, 3,000 unit tab Take 3,000 Units by mouth daily.  CALCIUM CARBONATE (TUMS PO) Take  by mouth as needed. No current facility-administered medications on file prior to visit. CARDIOLOGY STUDIES TO DATE:  5/14 echo normal lvef, lae  5/14 lexiscan cardiolyte stress neg, lvef 70%    Chief Complaint   Patient presents with    Irregular Heart Beat     HPI :  Ms. Michele Alberto is very unhappy today. She got a new CPAP machine at some point in the fall, but she says it is not working. She is waking up frequently at night and she is tired all day. I am not sure if she had another sleep study or just got a new machine and I forgot to ask her. She feels that she is mistreated by everyone and is frustrated by the fact that she cannot make appointments to care for her cat and herself. Fortunately, she is not having any particular cardiac complaints. Her blood pressure is under good control.           CARDIAC ROS:   negative for chest pain, dyspnea, palpitations, syncope, orthopnea, paroxysmal nocturnal dyspnea, exertional chest pressure/discomfort, claudication, lower extremity edema    Family History   Problem Relation Age of Onset    Diabetes Mother     Asthma Mother     Lung Disease Mother     Glaucoma Mother     Cancer Father     Heart Disease Father     Lung Disease Father     Psychiatric Disorder Father     Stroke Father     Other Sister         arthritis       Past Medical History:   Diagnosis Date    Aneurysm ([address]     supraclinoid/cerebral    Arrhythmia     AFib    Arthritis     Depression     Diabetes ([address]     her doc said not she is prediabetic    Hypertension     Ill-defined condition     scoliosis    Ill-defined condition     Other ill-defined conditions(799.89)     glaucoma    Other ill-defined conditions(799.89)     increased cholesterol    Other ill-defined conditions(799.89)     scoliosis    Other ill-defined conditions(799.89)     back pain    Other ill-defined conditions(799.89)     insomnia    Other ill-defined conditions(799.89)     abnormal wt gain    Other ill-defined conditions(799.89)     breast lump - left side at 3 o'clock position    Other ill-defined conditions(799.89) 1994    shingles    Other ill-defined conditions(799.89)     CTS    Other ill-defined conditions(799.89)     colon polyps    Other ill-defined conditions(799.89)     cataracts    Other ill-defined conditions(799.89)     vitamin D deficiency - hx of    Other ill-defined conditions(799.89)     REYES    Psychiatric disorder     depression/personality disorder/OCD    Scoliosis     Sleep apnea     Unspecified sleep apnea     sleep study 15 yrs ago/was told to come back and get a CPAP, but pt did not       GENERAL ROS:  A comprehensive review of systems was negative except for that written in the HPI.     Visit Vitals  /80 (BP 1 Location: Right arm, BP Patient Position: Sitting)   Pulse 70   Resp 16   Ht 5' 1\" (1.549 m)   Wt 205 lb 12.8 oz (93.4 kg)   BMI 38.89 kg/m²       Wt Readings from Last 3 Encounters:   01/24/19 205 lb 12.8 oz (93.4 kg)   06/27/18 200 lb (90.7 kg)   06/18/18 201 lb (91.2 kg)            BP Readings from Last 3 Encounters:   01/24/19 124/80   06/27/18 148/70   06/18/18 142/83       PHYSICAL EXAM  General appearance: alert, cooperative, no distress, appears stated age  Neurologic: Alert and oriented X 3  Neck: supple, symmetrical, trachea midline, no adenopathy, no carotid bruit and no JVD  Lungs: clear to auscultation bilaterally  Heart: irregularly irregular rhythm, S1, S2 normal, no S3 or S4  Extremities: extremities normal, atraumatic, no cyanosis or edema    Lab Results   Component Value Date/Time    Cholesterol, total 190 04/26/2018 03:51 PM    Cholesterol, total 181 12/19/2017 12:00 AM    Cholesterol, total 156 06/14/2017 12:00 AM    Cholesterol, total 166 03/30/2017 03:52 PM    Cholesterol, total 176 08/01/2016 05:05 PM    HDL Cholesterol 54 04/26/2018 03:51 PM    HDL Cholesterol 62 12/19/2017 12:00 AM    HDL Cholesterol 59 06/14/2017 12:00 AM    HDL Cholesterol 56 03/30/2017 03:52 PM    HDL Cholesterol 54 08/01/2016 05:05 PM    LDL, calculated 83 04/26/2018 03:51 PM    LDL, calculated 77 12/19/2017 12:00 AM    LDL, calculated 66 06/14/2017 12:00 AM    LDL, calculated 64 03/30/2017 03:52 PM    LDL, calculated 56 08/01/2016 05:05 PM    Triglyceride 264 (H) 04/26/2018 03:51 PM    Triglyceride 211 (H) 12/19/2017 12:00 AM    Triglyceride 156 (H) 06/14/2017 12:00 AM    Triglyceride 232 (H) 03/30/2017 03:52 PM    Triglyceride 328 (H) 08/01/2016 05:05 PM    CHOL/HDL Ratio 2.7 05/02/2014 04:25 AM    CHOL/HDL Ratio 3.1 12/13/2009 05:00 AM     ASSESSMENT  Ms. Eldred Bloch is stable and I think asymptomatic on her current medical regimen and needs no cardiac testing. I told her she really needed to try to work with the sleep doctors and tell them about her concerns. I wish things could be easier for her. current treatment plan is effective, no change in therapy  lab results and schedule of future lab studies reviewed with patient  reviewed diet, exercise and weight control    Encounter Diagnoses   Name Primary?  Chronic atrial fibrillation (HCC) Yes    Mixed hyperlipidemia     Severe obesity (BMI 35.0-39. 9) with comorbidity ([address]     BERNIE (obstructive sleep apnea)     Anxiety      No orders of the defined types were placed in this encounter. Follow-up Disposition:  Return in about 6 months (around 7/24/2019).     Kev Ashford MD  1/24/2019

## 2019-01-25 LAB
ALBUMIN SERPL-MCNC: 4.1 G/DL (ref 3.5–4.8)
ALP SERPL-CCNC: 59 IU/L (ref 39–117)
ALT SERPL-CCNC: 18 IU/L (ref 0–32)
APPEARANCE UR: CLEAR
AST SERPL-CCNC: 15 IU/L (ref 0–40)
BACTERIA #/AREA URNS HPF: ABNORMAL /[HPF]
BILIRUB DIRECT SERPL-MCNC: 0.09 MG/DL (ref 0–0.4)
BILIRUB SERPL-MCNC: <0.2 MG/DL (ref 0–1.2)
BILIRUB UR QL STRIP: NEGATIVE
BUN SERPL-MCNC: 14 MG/DL (ref 8–27)
BUN/CREAT SERPL: 20 (ref 12–28)
CALCIUM SERPL-MCNC: 9.8 MG/DL (ref 8.7–10.3)
CASTS URNS QL MICRO: ABNORMAL /LPF
CHLORIDE SERPL-SCNC: 105 MMOL/L (ref 96–106)
CHOLEST SERPL-MCNC: 160 MG/DL (ref 100–199)
CO2 SERPL-SCNC: 25 MMOL/L (ref 20–29)
COLOR UR: YELLOW
CREAT SERPL-MCNC: 0.69 MG/DL (ref 0.57–1)
EPI CELLS #/AREA URNS HPF: ABNORMAL /HPF
EST. AVERAGE GLUCOSE BLD GHB EST-MCNC: 123 MG/DL
GLUCOSE SERPL-MCNC: 113 MG/DL (ref 65–99)
GLUCOSE UR QL: NEGATIVE
HBA1C MFR BLD: 5.9 % (ref 4.8–5.6)
HDLC SERPL-MCNC: 54 MG/DL
HGB UR QL STRIP: ABNORMAL
KETONES UR QL STRIP: NEGATIVE
LDLC SERPL CALC-MCNC: 54 MG/DL (ref 0–99)
LEUKOCYTE ESTERASE UR QL STRIP: NEGATIVE
MICRO URNS: ABNORMAL
MUCOUS THREADS URNS QL MICRO: PRESENT
NITRITE UR QL STRIP: NEGATIVE
PH UR STRIP: 6 [PH] (ref 5–7.5)
POTASSIUM SERPL-SCNC: 4.5 MMOL/L (ref 3.5–5.2)
PROT SERPL-MCNC: 6.6 G/DL (ref 6–8.5)
PROT UR QL STRIP: NEGATIVE
RBC #/AREA URNS HPF: ABNORMAL /HPF
SODIUM SERPL-SCNC: 146 MMOL/L (ref 134–144)
SP GR UR: 1.02 (ref 1–1.03)
TRIGL SERPL-MCNC: 258 MG/DL (ref 0–149)
UROBILINOGEN UR STRIP-MCNC: 0.2 MG/DL (ref 0.2–1)
VLDLC SERPL CALC-MCNC: 52 MG/DL (ref 5–40)
WBC #/AREA URNS HPF: ABNORMAL /HPF

## 2019-01-25 NOTE — PROGRESS NOTES
Call- she may have a uti, given multiple drug allergies I advise a culture. Given transportation difficulties would she consent to [facility] for uti treatment?   - otherwise Labs look great overall . There is slight elevation of average blood sugar based on the A1c measurement. This can be controlled / improved by staying active and watching the diet for concentrated sweets and excessive starchy carbohydrates.   This is mild and stable

## 2019-01-28 ENCOUNTER — TELEPHONE (OUTPATIENT)
Dept: INTERNAL MEDICINE CLINIC | Age: 69
End: 2019-01-28

## 2019-01-28 NOTE — TELEPHONE ENCOUNTER
She also wants MD to order X-rays of hips. Asked her if she discussed this with MD at her last visit? She did not. Advised her he may want her to return for appt for further evaluation for this sinnce he did not see her for this issue at last appt. She states she wanted me to ask him anyway.  Will forward to MD.

## 2019-01-28 NOTE — TELEPHONE ENCOUNTER
Pt wanted Dr to know that she has taken tablets before -  RX:  Verapamil ER Capsules not available  Pt [phone]

## 2019-01-28 NOTE — PROGRESS NOTES
Advised pt she may have a uti - given multiple drug allergies MD advises a culture. Given transportation difficulties would she consent to [facility] for uti treatment? She states yes - can they come late afternoon tomorrow? Advised her sooner they come and collect urine to sooner results come back. Otherwise labs look great overall. There is slight elevation of average blood sugar based on the A1c measurement. This can be controlled / improved by staying active and watching the diet for concentrated sweets and excessive starchy carbohydrates. This is mild and stable.   [id number] [address] - spoke with Lissette Herndon - scheduled to be seen tonight around 7:30 pm.

## 2019-01-29 ENCOUNTER — TELEPHONE (OUTPATIENT)
Dept: SLEEP MEDICINE | Age: 69
End: 2019-01-29

## 2019-01-29 DIAGNOSIS — M25.551 PAIN OF BOTH HIP JOINTS: ICD-10-CM

## 2019-01-29 DIAGNOSIS — M25.552 PAIN OF BOTH HIP JOINTS: ICD-10-CM

## 2019-01-29 DIAGNOSIS — G89.29 CHRONIC KNEE PAIN, UNSPECIFIED LATERALITY: Primary | ICD-10-CM

## 2019-01-29 DIAGNOSIS — M25.569 CHRONIC KNEE PAIN, UNSPECIFIED LATERALITY: Primary | ICD-10-CM

## 2019-01-29 RX ORDER — VERAPAMIL HYDROCHLORIDE 120 MG/1
TABLET, FILM COATED, EXTENDED RELEASE ORAL
Qty: 60 TAB | Refills: 3 | Status: SHIPPED | OUTPATIENT
Start: 2019-01-29 | End: 2019-07-24 | Stop reason: SDUPTHER

## 2019-01-29 NOTE — TELEPHONE ENCOUNTER
[address] to see if they can change pt's verapamil to tablets? Pharmacist states she can. Will send in. Advised pt we will send in her Verapamil to tabs. Also MD approved her request for hip x-rays. She has also requesting knee x-rays. Asked MD if this was ok? He approved this also. Ordered - pt has eye appt at [facility] on Thursday 1/31/19. Advised her to get her x-rays done there same day.  Will forward to MD.

## 2019-01-29 NOTE — TELEPHONE ENCOUNTER
The patient phoned the office requesting an order for a nasal pillow. Spoke with Rae Garcia at [facility] and he is willing to mail her a pack of masks and she can keep the one that best fits her.

## 2019-01-31 ENCOUNTER — TELEPHONE (OUTPATIENT)
Dept: INTERNAL MEDICINE CLINIC | Age: 69
End: 2019-01-31

## 2019-01-31 NOTE — TELEPHONE ENCOUNTER
Spoke with Riza Christopher with [facility] - requested to change order to bilat hip x-ray - done.

## 2019-01-31 NOTE — TELEPHONE ENCOUNTER
gilberto at [facility] radiology [phone]     Requesting a call back about x-rays ordered on this pt.

## 2019-02-04 ENCOUNTER — TELEPHONE (OUTPATIENT)
Dept: SLEEP MEDICINE | Age: 69
End: 2019-02-04

## 2019-03-20 ENCOUNTER — TELEPHONE (OUTPATIENT)
Dept: INTERNAL MEDICINE CLINIC | Age: 69
End: 2019-03-20

## 2019-03-20 NOTE — TELEPHONE ENCOUNTER
[address] [facility]     Please call regarding a letter  that was faxed here last week, She needs a reply ASAP

## 2019-03-21 ENCOUNTER — TELEPHONE (OUTPATIENT)
Dept: INTERNAL MEDICINE CLINIC | Age: 69
End: 2019-03-21

## 2019-03-21 NOTE — TELEPHONE ENCOUNTER
Terrance Agnes (Self) [phone] (H)     Pt says that she left a mess for a call back yesterday and she does not need that now. It has been taken care of.

## 2019-04-26 RX ORDER — BENAZEPRIL HYDROCHLORIDE 10 MG/1
TABLET ORAL
Qty: 180 TAB | Refills: 0 | Status: SHIPPED | OUTPATIENT
Start: 2019-04-26 | End: 2019-07-24 | Stop reason: SDUPTHER

## 2019-04-26 RX ORDER — LOVASTATIN 20 MG/1
TABLET ORAL
Qty: 90 TAB | Refills: 0 | Status: SHIPPED | OUTPATIENT
Start: 2019-04-26 | End: 2019-07-24 | Stop reason: SDUPTHER

## 2019-06-20 ENCOUNTER — TELEPHONE (OUTPATIENT)
Dept: SLEEP MEDICINE | Age: 69
End: 2019-06-20

## 2019-06-20 DIAGNOSIS — G47.33 OBSTRUCTIVE SLEEP APNEA (ADULT) (PEDIATRIC): Primary | ICD-10-CM

## 2019-07-24 ENCOUNTER — TELEPHONE (OUTPATIENT)
Dept: INTERNAL MEDICINE CLINIC | Age: 69
End: 2019-07-24

## 2019-07-24 RX ORDER — VERAPAMIL HYDROCHLORIDE 120 MG/1
TABLET, FILM COATED, EXTENDED RELEASE ORAL
Qty: 180 TAB | Refills: 0 | Status: SHIPPED | OUTPATIENT
Start: 2019-07-24 | End: 2019-12-27

## 2019-07-24 RX ORDER — LOVASTATIN 20 MG/1
TABLET ORAL
Qty: 90 TAB | Refills: 0 | Status: SHIPPED | OUTPATIENT
Start: 2019-07-24 | End: 2019-08-31 | Stop reason: SDUPTHER

## 2019-07-24 RX ORDER — CLONIDINE 0.3 MG/24H
PATCH, EXTENDED RELEASE TRANSDERMAL
Qty: 4 PATCH | Refills: 2 | Status: SHIPPED | OUTPATIENT
Start: 2019-07-24 | End: 2019-10-29 | Stop reason: SDUPTHER

## 2019-07-24 RX ORDER — BENAZEPRIL HYDROCHLORIDE 10 MG/1
TABLET ORAL
Qty: 180 TAB | Refills: 0 | Status: SHIPPED | OUTPATIENT
Start: 2019-07-24 | End: 2019-12-08 | Stop reason: SDUPTHER

## 2019-07-24 NOTE — TELEPHONE ENCOUNTER
[phone]    The patient would like to see if dispatch health can come to her home tomorrow to check on something's for her.  Please call to discuss

## 2019-07-24 NOTE — TELEPHONE ENCOUNTER
Pt wanted to see if [facility] could come to her home tomorrow to check on something's for her. She states she had to cancel her appt tomorrow due to heat. Wanted them to come see her to do labs so she can get her refills. Advised her they are for emergency issues - not for office visits. That MD needs to physically see her and evaluate her to make sure she is doing well - her medications are working. She states she has rescheduled her appt to 9/12/19. Asked her why so far out? Because it is cooler then. Advised her I would send message to MD to see if he will refill meds until her sept appt but she needs to keep that one.

## 2019-07-24 NOTE — TELEPHONE ENCOUNTER
Advised pt MD has approved her request for refills on her meds but she needs to keep her appt on 9/12/19. Rx sent to pharmacy.

## 2019-08-02 ENCOUNTER — TELEPHONE (OUTPATIENT)
Dept: SLEEP MEDICINE | Age: 69
End: 2019-08-02

## 2019-08-02 NOTE — TELEPHONE ENCOUNTER
Attempted to LVM requesting a return call to schedule a F2F per DME. However, she has no voice mail.

## 2019-08-07 ENCOUNTER — OFFICE VISIT (OUTPATIENT)
Dept: SLEEP MEDICINE | Age: 69
End: 2019-08-07

## 2019-08-07 VITALS
HEART RATE: 74 BPM | BODY MASS INDEX: 38.14 KG/M2 | SYSTOLIC BLOOD PRESSURE: 124 MMHG | OXYGEN SATURATION: 97 % | HEIGHT: 61 IN | DIASTOLIC BLOOD PRESSURE: 76 MMHG | WEIGHT: 202 LBS

## 2019-08-07 DIAGNOSIS — G47.33 OBSTRUCTIVE SLEEP APNEA (ADULT) (PEDIATRIC): Primary | ICD-10-CM

## 2019-08-07 RX ORDER — SODIUM FLUORIDE 5 MG/G
PASTE, DENTIFRICE ORAL
COMMUNITY
Start: 2019-05-16

## 2019-08-07 NOTE — PATIENT INSTRUCTIONS
[address] [place]. [place], [address]  Tel.  [phone]  Fax. [address] [facility] 60  [place], [address]  Tel.  [phone]  Fax. [phone] [address] [facility] [place]  Tel.  [phone]  Fax. [phone]     PROPER SLEEP HYGIENE    What to avoid  · Do not have drinks with caffeine, such as coffee or black tea, for 8 hours before bed. · Do not smoke or use other types of tobacco near bedtime. Nicotine is a stimulant and can keep you awake. · Avoid drinking alcohol late in the evening, because it can cause you to wake in the middle of the night. · Do not eat a big meal close to bedtime. If you are hungry, eat a light snack. · Do not drink a lot of water close to bedtime, because the need to urinate may wake you up during the night. · Do not read or watch TV in bed. Use the bed only for sleeping and sexual activity. What to try  · Go to bed at the same time every night, and wake up at the same time every morning. Do not take naps during the day. · Keep your bedroom quiet, dark, and cool. · Get regular exercise, but not within 3 to 4 hours of your bedtime. .  · Sleep on a comfortable pillow and mattress. · If watching the clock makes you anxious, turn it facing away from you so you cannot see the time. · If you worry when you lie down, start a worry book. Well before bedtime, write down your worries, and then set the book and your concerns aside. · Try meditation or other relaxation techniques before you go to bed. · If you cannot fall asleep, get up and go to another room until you feel sleepy. Do something relaxing. Repeat your bedtime routine before you go to bed again. · Make your house quiet and calm about an hour before bedtime. Turn down the lights, turn off the TV, log off the computer, and turn down the volume on music. This can help you relax after a busy day.     Drowsy Driving  The [address] [place] [address] Road Traffic Safety Administration cites drowsiness as a causing factor in more than 371,762 police reported crashes annually, resulting in 76,000 injuries and 1,500 deaths. Other surveys suggest 55% of people polled have driven while drowsy in the past year, 23% had fallen asleep but not crashed, 3% crashed, and 2% had and accident due to drowsy driving. Who is at risk? Young Drivers: One study of drowsy driving accidents states that 55% of the drivers were under 25 years. Of those, 75% were male. Shift Workers and Travelers: People who work overnight or travel across time zones frequently are at higher risk of experiencing Circadian Rhythm Disorders. They are trying to work and function when their body is programed to sleep. Sleep Deprived: Lack of sleep has a serious impact on your ability to pay attention or focus on a task. Consistently getting less than the average of 8 hours your body needs creates partial or cumulative sleep deprivation. Untreated Sleep Disorders: Sleep Apnea, Narcolepsy, R.L.S., and other sleep disorders (untreated) prevent a person from getting enough restful sleep. This leads to excessive daytime sleepiness and increases the risk for drowsy driving accidents by up to 7 times. Medications / Alcohol: Even over the counter medications can cause drowsiness. Medications that impair a drivers attention should have a warning label. Alcohol naturally makes you sleepy and on its own can cause accidents. Combined with excessive drowsiness its effects are amplified. Signs of Drowsy Driving:   * You don't remember driving the last few miles   * You may drift out of your wilma   * You are unable to focus and your thoughts wander   * You may yawn more often than normal   * You have difficulty keeping your eyes open / nodding off   * Missing traffic signs, speeding, or tailgating  Prevention-   Good sleep hygiene, lifestyle and behavioral choices have the most impact on drowsy driving.  There is no substitute for sleep and the average person requires 8 hours nightly. If you find yourself driving drowsy, stop and sleep. Consider the sleep hygiene tips provided during your visit as well. Medication Refill Policy: Refills for all medications require 1 week advance notice. Please have your pharmacy fax a refill request. We are unable to fax, or call in \"controled substance\" medications and you will need to pick these prescriptions up from our office. [email or web address] Activation    Thank you for requesting access to [employer]. Please follow the instructions below to securely access and download your online medical record. [employer] allows you to send messages to your doctor, view your test results, renew your prescriptions, schedule appointments, and more. How Do I Sign Up? 1. In your internet browser, go to [email or web address]. 2. Click on the First Time User? Click Here link in the Sign In box. You will see the New Member Sign Up page. 3. Enter your Alg[employer] Access Code exactly as it appears below. You will not need to use this code after youve completed the sign-up process. If you do not sign up before the expiration date, you must request a new code. AlgEvolve Access Code: Activation code not generated  Current AlgEv[employer] Status: Active (This is the date your AlgEvolve access code will )    4. Enter the last four digits of your Social Security Number (xxxx) and Date of Birth (mm/dd/yyyy) as indicated and click Submit. You will be taken to the next sign-up page. 5. Create a AlgEvolve ID. This will be your AlgEvolve login ID and cannot be changed, so think of one that is secure and easy to remember. 6. Create a AlgEvolve password. You can change your password at any time. 7. Enter your Password Reset Question and Answer. This can be used at a later time if you forget your password. 8. Enter your e-mail address. You will receive e-mail notification when new information is available in [address]   9. Click Sign Up. You can now view and download portions of your medical record. 10. Click the Download Summary menu link to download a portable copy of your medical information. Additional Information    If you have questions, please call [phone]. Remember, SecureAuth is NOT to be used for urgent needs. For medical emergencies, dial 911.

## 2019-08-07 NOTE — PROGRESS NOTES
[address] [place]. [place], [address]  Tel.  [phone]  Fax. 100 [facility] 60  [place], [address]  Tel.  [phone]  Fax. [phone] [address] [facility] [place]   Tel.  [phone]  Fax. [phone]     S>Michelle Walker is a 77 y.o. female seen for a positive airway pressure follow-up. She reports no problems using the device. The following problems are identified:    Drowsiness no Problems exhaling no   Snoring no Forget to put on no   Mask Comfortable yes Can't fall asleep no   Dry Mouth yes Mask falls off no   Air Leaking no Frequent awakenings yes     Download reviewed. Therapy Apnea Index averaged over PAP use: 5 /hr which reflects improved sleep breathing condition. Allergies   Allergen Reactions    Latex Other (comments)     \"Trouble breathing\"    Cardizem [Diltiazem Hcl] Unknown (comments)    Ceclor [Cefaclor] Unknown (comments)    Coffee (Coffea Arabica) Other (comments)     Causes swollen neck glands    Also states allergy to tea with same reaction    Flexeril [Cyclobenzaprine] Other (comments)     insomnia    Levaquin [Levofloxacin] Other (comments)     Pt does not want medication since reading insert. - tendon pain    Ludiomil Other (comments)     depression    Macrodantin [Nitrofurantoin Macrocrystalline] Unknown (comments)    Other Medication Other (comments)     Side effects to some BP meds. Unsure of names. Pt will call Dr. Yuliet Gibbons office and have them fax the information. Information received and entered into chart. Allergic to syntex.  Plendil [Felodipine] Other (comments)     Flushing, chest pain    Prinivil [Lisinopril] Unknown (comments)    Procardia [Nifedipine] Other (comments)     Burning feeling in brain. XL    Provera [Medroxyprogesterone] Other (comments)     Finger pain    Shellfish Derived Unknown (comments)     Allergic to shrimp.     Singulair [Montelukast] Other (comments)     faintness  Tenoretic 100 [Atenolol-Chlorthalidone] Other (comments)     PO - headache    Tenormin [Atenolol] Unknown (comments)    Vistaril [Hydroxyzine Pamoate] Unknown (comments)    Zocor [Simvastatin] Other (comments)     Leg weakness       She has a current medication list which includes the following prescription(s): clinpro 5000, lovastatin, benazepril, verapamil er, clonidine, rivaroxaban, brinzolamide-brimonidine, ascorbic acid (vitamin c), cholecalciferol (vitamin d3), and calcium carbonate. .      She  has a past medical history of Aneurysm ([facility] [address] Arrhythmia, Arthritis, Depression, Diabetes ([facility] [address] Hypertension, Ill-defined condition, Ill-defined condition, Other ill-defined conditions(799.89), Other ill-defined conditions(799.89), Other ill-defined conditions(799.89), Other ill-defined conditions(799.89), Other ill-defined conditions(799.89), Other ill-defined conditions(799.89), Other ill-defined conditions(799.89), Other ill-defined conditions(799.89) (1994), Other ill-defined conditions(799.89), Other ill-defined conditions(799.89), Other ill-defined conditions(799.89), Other ill-defined conditions(799.89), Other ill-defined conditions(799.89), Psychiatric disorder, Scoliosis, Sleep apnea, and Unspecified sleep apnea. Widen Sleepiness Score: 11   and Modified F.O.S.Q. Score Total / 2: 15   which reflect improved sleep quality over therapy time. O>    Visit Vitals  /76   Pulse 74   Ht 5' 1\" (1.549 m)   Wt 202 lb (91.6 kg)   SpO2 97%   BMI 38.17 kg/m²           General:   Alert, oriented, not in distress   Neck:   No JVD    Chest/Lungs:  symetrical lung expansion , no accessory muscle use    Extremities:  no obvious rashes , negative edema    Neuro:  No focal deficits ; No obvious tremor    Psych:  Normal affect ,  Normal countenance ;         A>    ICD-10-CM ICD-9-CM    1. Obstructive sleep apnea (adult) (pediatric) G47.33 327.23 AMB SUPPLY ORDER     AHI = 11(2014).   On CPAP :  10 cmH2O. Compliant:      yes    Therapeutic Response:  Positive    P>      *   Tech to tech how to adjust humidification- see tech note  she will continue on her current pressure settings. I have ordered replacement supplies    she is compliant with PAP therapy and PAP continues to benefit patient and remains necessary for control of her sleep apnea. I reviewed that her CPAP records hours of use, not quality of sleep. Awakenings during the night are normal and we can try to minimize/shorten them. A regular exercise schedule, at least 3 hours before bedtime, would be beneficial to improving sleep quality. she was advised to avoid evening light and to use sunglasses in the late afternoon. All of her questions/concerns (on her notes) were addressed. She does not wish to have an in-lab titration at this time. She is not interested in a second opinion at this time. * She was asked to contact our office for any problems regarding PAP therapy. * Counseling was provided regarding the importance of regular PAP use and on proper sleep hygiene and safe driving. * Re-enforced proper and regular cleaning for the device.     Electronically signed by    Lj Yu MD  Diplomate in Sleep Medicine  [facility]

## 2019-08-08 ENCOUNTER — DOCUMENTATION ONLY (OUTPATIENT)
Dept: SLEEP MEDICINE | Age: 69
End: 2019-08-08

## 2019-08-23 RX ORDER — VERAPAMIL HYDROCHLORIDE 120 MG/1
TABLET, FILM COATED, EXTENDED RELEASE ORAL
Qty: 60 TAB | Refills: 2 | Status: SHIPPED | OUTPATIENT
Start: 2019-08-23 | End: 2019-10-22

## 2019-08-28 DIAGNOSIS — I10 ESSENTIAL HYPERTENSION: ICD-10-CM

## 2019-08-28 DIAGNOSIS — I48.20 CHRONIC ATRIAL FIBRILLATION (HCC): ICD-10-CM

## 2019-08-29 ENCOUNTER — TELEPHONE (OUTPATIENT)
Dept: SLEEP MEDICINE | Age: 69
End: 2019-08-29

## 2019-08-29 NOTE — TELEPHONE ENCOUNTER
Patient called. She said she got the wrong filters, needs a tank but got tubing, has a dreamstation, and needs her nasal mask. She said [facility] is waiting on a prescription from us. Emailed Viki Boone to see If its the [address], and requested he resend if so. Will notify patient of update once I hear back from [facility].

## 2019-08-29 NOTE — TELEPHONE ENCOUNTER
Requested Prescriptions     Signed Prescriptions Disp Refills    rivaroxaban (XARELTO) 20 mg tab tablet 30 Tab 1     Sig: TAKE 1 TABLET BY MOUTH EVERY DAY     Authorizing Provider: Aminata Frank     Ordering User: Natalia Vera    Per Dr. Triny Bradley verbal orders

## 2019-09-01 RX ORDER — LOVASTATIN 20 MG/1
TABLET ORAL
Qty: 90 TAB | Refills: 0 | Status: SHIPPED | OUTPATIENT
Start: 2019-09-01 | End: 2019-11-08 | Stop reason: SDUPTHER

## 2019-10-02 ENCOUNTER — TELEPHONE (OUTPATIENT)
Dept: INTERNAL MEDICINE CLINIC | Age: 69
End: 2019-10-02

## 2019-10-02 DIAGNOSIS — I48.20 CHRONIC ATRIAL FIBRILLATION (HCC): ICD-10-CM

## 2019-10-02 DIAGNOSIS — I10 ESSENTIAL HYPERTENSION: ICD-10-CM

## 2019-10-02 NOTE — TELEPHONE ENCOUNTER
Requested Prescriptions     Signed Prescriptions Disp Refills    rivaroxaban (XARELTO) 20 mg tab tablet 30 Tab 1     Sig: TAKE 1 TABLET BY MOUTH EVERY DAY     Authorizing Provider: Faustino Morse     Ordering User: Natividad Mcfadden    Per Dr. Shaan Baird verbal orders     Last office visit 1/24/19. F/U scheduled for 10/22/19.     Future Appointments   Date Time Provider Kendrick Eusebia   10/22/2019  3:00 PM Daniel Baldwin  [address]   10/22/2019  4:35 PM Erick Bear MD [id number] [facility]

## 2019-10-02 NOTE — TELEPHONE ENCOUNTER
Patient will be coming in for an appt with Dr. Janes Rubio on 10/22 and is asking if her labslip could be put at the  so she can get that done before the appt. Has transportation issues and can't get here any other time.

## 2019-10-03 DIAGNOSIS — Z79.899 ENCOUNTER FOR LONG-TERM (CURRENT) USE OF MEDICATIONS: ICD-10-CM

## 2019-10-03 DIAGNOSIS — R73.01 IFG (IMPAIRED FASTING GLUCOSE): ICD-10-CM

## 2019-10-03 DIAGNOSIS — E78.2 MIXED HYPERLIPIDEMIA: Primary | ICD-10-CM

## 2019-10-03 NOTE — TELEPHONE ENCOUNTER
Attempted to call pt - phone rang long time then received message to \"enter remote access code. \" Not able to leave message. Lab slip printed and at  for pt.

## 2019-10-16 NOTE — TELEPHONE ENCOUNTER
Lab Results   Component Value Date    HGBA1C 5 0 10/16/2019    Diet controlled, doing well with this  Requested Prescriptions     Signed Prescriptions Disp Refills    XARELTO 20 mg tab tablet 90 Tab 2     Sig: TAKE 1 TABLET BY MOUTH EVERY DAY -NEED APPT     Authorizing Provider: Yael Carrera     Ordering User: Alicja Lorenzo verbal orders

## 2019-10-22 ENCOUNTER — OFFICE VISIT (OUTPATIENT)
Dept: CARDIOLOGY CLINIC | Age: 69
End: 2019-10-22

## 2019-10-22 ENCOUNTER — HOSPITAL ENCOUNTER (OUTPATIENT)
Dept: LAB | Age: 69
Discharge: HOME | End: 2019-10-22
Payer: MEDICARE

## 2019-10-22 ENCOUNTER — OFFICE VISIT (OUTPATIENT)
Dept: INTERNAL MEDICINE CLINIC | Age: 69
End: 2019-10-22

## 2019-10-22 VITALS
DIASTOLIC BLOOD PRESSURE: 80 MMHG | HEART RATE: 64 BPM | HEIGHT: 61 IN | WEIGHT: 200 LBS | OXYGEN SATURATION: 96 % | SYSTOLIC BLOOD PRESSURE: 124 MMHG | RESPIRATION RATE: 16 BRPM | BODY MASS INDEX: 37.76 KG/M2

## 2019-10-22 DIAGNOSIS — Z23 ENCOUNTER FOR IMMUNIZATION: Primary | ICD-10-CM

## 2019-10-22 DIAGNOSIS — I10 ESSENTIAL HYPERTENSION: ICD-10-CM

## 2019-10-22 DIAGNOSIS — I48.20 CHRONIC ATRIAL FIBRILLATION (HCC): ICD-10-CM

## 2019-10-22 DIAGNOSIS — F41.9 ANXIETY: ICD-10-CM

## 2019-10-22 DIAGNOSIS — M41.25 OTHER IDIOPATHIC SCOLIOSIS, THORACOLUMBAR REGION: ICD-10-CM

## 2019-10-22 DIAGNOSIS — E66.01 SEVERE OBESITY (BMI 35.0-39.9) WITH COMORBIDITY (HCC): ICD-10-CM

## 2019-10-22 DIAGNOSIS — E78.2 MIXED HYPERLIPIDEMIA: ICD-10-CM

## 2019-10-22 DIAGNOSIS — I48.20 CHRONIC ATRIAL FIBRILLATION (HCC): Primary | ICD-10-CM

## 2019-10-22 DIAGNOSIS — G47.33 OSA (OBSTRUCTIVE SLEEP APNEA): ICD-10-CM

## 2019-10-22 DIAGNOSIS — R73.01 IFG (IMPAIRED FASTING GLUCOSE): ICD-10-CM

## 2019-10-22 PROCEDURE — 83036 HEMOGLOBIN GLYCOSYLATED A1C: CPT

## 2019-10-22 PROCEDURE — 80048 BASIC METABOLIC PNL TOTAL CA: CPT

## 2019-10-22 PROCEDURE — 80061 LIPID PANEL: CPT

## 2019-10-22 PROCEDURE — 85025 COMPLETE CBC W/AUTO DIFF WBC: CPT

## 2019-10-22 PROCEDURE — 81001 URINALYSIS AUTO W/SCOPE: CPT

## 2019-10-22 PROCEDURE — 80076 HEPATIC FUNCTION PANEL: CPT

## 2019-10-22 PROCEDURE — 36415 COLL VENOUS BLD VENIPUNCTURE: CPT

## 2019-10-22 NOTE — PROGRESS NOTES
HISTORY OF PRESENT ILLNESS  Bisi Rosado is a 77 y.o. female. HPI Subjective: Linda Eason is seen today for follow up of hypertension and other problems. 1. Hypertension, stable on current regimen. 2. Chronic atrial fibrillation. She saw Dr. Marleny Astorga today. She is not having any current symptoms. 3. BERNIE. She sees Dr. James Kay. She is satisfied with her current status, but says Dr. James Kay has not helped her. I asked her to follow up to discuss other options. 4. Hyperlipidemia. Due for routine labs. 5. 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 She requests an order for a \"transformer chair\". This represents a face to face for this wheelchair. She has significant debility and does require a good wheelchair as she is in this most of the day. Social History:  Notable for her beloved cat passing away and I offered condolences. She is pretty sad about it. Review of Systems   Constitutional: Negative for weight loss. Respiratory: Negative. Cardiovascular: Negative for chest pain, palpitations, leg swelling and PND. Musculoskeletal: Positive for back pain. Negative for myalgias. Neurological: Positive for weakness. Negative for focal weakness. Psychiatric/Behavioral: Positive for depression. The patient has insomnia. Physical Exam   Constitutional: No distress. Cardiovascular: Normal rate. An irregularly irregular rhythm present. Exam reveals no gallop and no friction rub. No murmur heard. Pulmonary/Chest: Effort normal and breath sounds normal.   Musculoskeletal: She exhibits edema. Trace stasis edema bilateral lower extremities. Nursing note and vitals reviewed. ASSESSMENT and PLAN  Diagnoses and all orders for this visit:    1. Encounter for immunization  -     INFLUENZA VACCINE INACTIVATED (IIV), SUBUNIT, ADJUVANTED, IM  -     AR IMMUNIZ ADMIN,1 SINGLE/COMB VAC/TOXOID    2. Chronic atrial fibrillation- See cardiologist as directed.      3. Mixed hyperlipidemia- Check lipid panel and appropriate studies to rule out med side effects now and in 6 months- high risk med management. 4. IFG (impaired fasting glucose)- Follow off treatment     5. Anxiety- Continue current regimen of prescription and / or OTC medications     6.  Other idiopathic scoliosis, thoracolumbar region- Proceed with plan as discussed

## 2019-10-22 NOTE — PROGRESS NOTES
Patient received flu vaccine in office today. Administered Fluad 0.5 ml in right deltoid, IM.  Pt tolerated well

## 2019-10-22 NOTE — PROGRESS NOTES
HISTORY OF PRESENT ILLNESS  Vazquez Cota is a 77 y.o. female     SUMMARY:   Problem List  Date Reviewed: 10/22/2019          Codes Class Noted    Severe obesity (BMI 35.0-39. 9) with comorbidity ([facility] [address] ICD-10-CM: E66.01  ICD-9-CM: 278.01  4/26/2018        Other idiopathic scoliosis, thoracolumbar region ICD-10-CM: M41.25  ICD-9-CM: 737.30  4/26/2018        Anxiety ICD-10-CM: F41.9  ICD-9-CM: 300.00  3/30/2017        Active advance directive on file ICD-10-CM: Z78.9  ICD-9-CM: V49.89  8/1/2016        Chronic atrial fibrillation (Chronic) ICD-10-CM: I48.20  ICD-9-CM: 427.31  10/7/2015        Mixed hyperlipidemia ICD-10-CM: E78.2  ICD-9-CM: 272.2  10/7/2015        Microscopic hematuria ICD-10-CM: R31.29  ICD-9-CM: 599.72  10/7/2015        IFG (impaired fasting glucose) ICD-10-CM: R73.01  ICD-9-CM: 790.21  10/7/2015        Major depression ICD-10-CM: F32.9  ICD-9-CM: 296.20  4/6/2015        BERNIE (obstructive sleep apnea) ICD-10-CM: G47.33  ICD-9-CM: 327.23  11/17/2014        Cerebral aneurysm, nonruptured ICD-10-CM: I67.1  ICD-9-CM: 437.3  8/5/2014        Abnormal involuntary movements(781.0) ICD-10-CM: R25.9  ICD-9-CM: 781.0  8/4/2014        Disturbance of skin sensation ICD-10-CM: R20.9  ICD-9-CM: 782.0  8/3/2014        Sleep apnea ICD-10-CM: G47.30  ICD-9-CM: 780.57  5/13/2014              Current Outpatient Medications on File Prior to Visit   Medication Sig    rivaroxaban (XARELTO) 20 mg tab tablet TAKE 1 TABLET BY MOUTH EVERY DAY    lovastatin (MEVACOR) 20 mg tablet TAKE ONE TABLET BY MOUTH DAILY    CLINPRO 5000 1.1 % pste     benazepril (LOTENSIN) 10 mg tablet TAKE ONE TABLET BY MOUTH TWICE A DAY (Patient taking differently: TAKE ONE TABLET BY MOUTH TWICE A DAY  (as of 10-22-19, taking as needed per BP reading.)    verapamil ER (CALAN-SR) 120 mg tablet TAKE ONE CAPSULE BY MOUTH TWICE A DAY. (Patient taking differently: TAKE ONE CAPSULE BY MOUTH TWICE A DAY. (as of 10-22-19, taking daily based on BP))  cloNIDine (CATAPRES-TTS-3) 0.3 mg/24 hr APPLY 1 PATCH EVERY 8 DAYS AS DIRECTED    brinzolamide-brimonidine (SIMBRINZA) 1-0.2 % drps Apply  to eye two (2) times a day.  ascorbic acid (VITAMIN C) 500 mg tablet Take 1,000 mg by mouth nightly.  Cholecalciferol, Vitamin D3, 3,000 unit tab Take 3,000 Units by mouth daily.  CALCIUM CARBONATE (TUMS PO) Take  by mouth as needed. No current facility-administered medications on file prior to visit. CARDIOLOGY STUDIES TO DATE:  5/14 echo normal lvef, lae  5/14 lexiscan cardiolyte stress neg, lvef 70%    Chief Complaint   Patient presents with    Follow-up     6 months (overdue)    Irregular Heart Beat     HPI :  Ms. Cameron Camara is kind of down in the dumps today because apparently her cat of 14 years recently had to be put down, so she is completely alone again in her apartment. Her initial blood pressure today was elevated. However, on followup it was okay and she tells me that I told her that her blood pressure was too low and therefore she should not take her blood pressure medication unless she got a high reading and earlier today it was not high so she did not take it. I took the opportunity to look back through records and going all the way back to early 2014 when we first met, her lowest systolic blood pressure was 100.         CARDIAC ROS:   negative for chest pain, dyspnea, palpitations, syncope, orthopnea, paroxysmal nocturnal dyspnea, exertional chest pressure/discomfort, claudication, lower extremity edema    Family History   Problem Relation Age of Onset    Diabetes Mother     Asthma Mother     Lung Disease Mother     Glaucoma Mother     Cancer Father     Heart Disease Father     Lung Disease Father     Psychiatric Disorder Father     Stroke Father     Other Sister         arthritis       Past Medical History:   Diagnosis Date    Aneurysm [facility])     supraclinoid/cerebral    Arrhythmia     AFib    Arthritis     Depression     Diabetes [facility])     her doc said not she is prediabetic    Hypertension     Ill-defined condition     scoliosis    Ill-defined condition     Other ill-defined conditions(799.89)     glaucoma    Other ill-defined conditions(799.89)     increased cholesterol    Other ill-defined conditions(799.89)     scoliosis    Other ill-defined conditions(799.89)     back pain    Other ill-defined conditions(799.89)     insomnia    Other ill-defined conditions(799.89)     abnormal wt gain    Other ill-defined conditions(799.89)     breast lump - left side at 3 o'clock position    Other ill-defined conditions(799.89) 1994    shingles    Other ill-defined conditions(799.89)     CTS    Other ill-defined conditions(799.89)     colon polyps    Other ill-defined conditions(799.89)     cataracts    Other ill-defined conditions(799.89)     vitamin D deficiency - hx of    Other ill-defined conditions(799.89)     REYES    Psychiatric disorder     depression/personality disorder/OCD    Scoliosis     Sleep apnea     Unspecified sleep apnea     sleep study 15 yrs ago/was told to come back and get a CPAP, but pt did not       GENERAL ROS:  A comprehensive review of systems was negative except for that written in the HPI.     Visit Vitals  /90 (BP 1 Location: Left arm, BP Patient Position: Sitting)   Pulse 64 Comment: irregular   Resp 16   Ht 5' 1\" (1.549 m)   Wt 200 lb (90.7 kg)   SpO2 96%   BMI 37.79 kg/m²       Wt Readings from Last 3 Encounters:   10/22/19 200 lb (90.7 kg)   08/07/19 202 lb (91.6 kg)   01/24/19 204 lb (92.5 kg)            BP Readings from Last 3 Encounters:   10/22/19 130/90   08/07/19 124/76   01/24/19 124/80       PHYSICAL EXAM  General appearance: alert, cooperative, no distress, appears stated age  Neurologic: Alert and oriented X 3  Neck: supple, symmetrical, trachea midline, no adenopathy, no carotid bruit and no JVD  Lungs: clear to auscultation bilaterally  Heart: irregularly irregular rhythm, S1, S2 normal, no S3 or S4  Extremities: extremities normal, atraumatic, no cyanosis or edema    Lab Results   Component Value Date/Time    Cholesterol, total 160 01/24/2019 04:07 PM    Cholesterol, total 190 04/26/2018 03:51 PM    Cholesterol, total 181 12/19/2017 12:00 AM    Cholesterol, total 156 06/14/2017 12:00 AM    Cholesterol, total 166 03/30/2017 03:52 PM    HDL Cholesterol 54 01/24/2019 04:07 PM    HDL Cholesterol 54 04/26/2018 03:51 PM    HDL Cholesterol 62 12/19/2017 12:00 AM    HDL Cholesterol 59 06/14/2017 12:00 AM    HDL Cholesterol 56 03/30/2017 03:52 PM    LDL, calculated 54 01/24/2019 04:07 PM    LDL, calculated 83 04/26/2018 03:51 PM    LDL, calculated 77 12/19/2017 12:00 AM    LDL, calculated 66 06/14/2017 12:00 AM    LDL, calculated 64 03/30/2017 03:52 PM    Triglyceride 258 (H) 01/24/2019 04:07 PM    Triglyceride 264 (H) 04/26/2018 03:51 PM    Triglyceride 211 (H) 12/19/2017 12:00 AM    Triglyceride 156 (H) 06/14/2017 12:00 AM    Triglyceride 232 (H) 03/30/2017 03:52 PM    CHOL/HDL Ratio 2.7 05/02/2014 04:25 AM    CHOL/HDL Ratio 3.1 12/13/2009 05:00 AM     ASSESSMENT  Ms. Prerna Jordan is stable and I think asymptomatic from a cardiac perspective, well compensated on a good medical regimen and needs no cardiac testing at this time. I forgot to mention one of her other concerns is that Xarelto may go generic and she has heard that generic anticoagulants are suspect. I tried to reassure her that we would cross that bridge when we got to it.       current treatment plan is effective, no change in therapy  lab results and schedule of future lab studies reviewed with patient  reviewed diet, exercise and weight control    Encounter Diagnoses   Name Primary?  Chronic atrial fibrillation Yes    Essential hypertension     Mixed hyperlipidemia     Severe obesity (BMI 35.0-39. 9) with comorbidity ([address]     BERNIE (obstructive sleep apnea)     Anxiety      No orders of the defined types were placed in this encounter. Follow-up and Dispositions    · Return in about 6 months (around 4/22/2020).          Rob Carrillo MD  10/22/2019

## 2019-10-23 LAB
ALBUMIN SERPL-MCNC: 4.1 G/DL (ref 3.5–4.8)
ALP SERPL-CCNC: 60 IU/L (ref 39–117)
ALT SERPL-CCNC: 19 IU/L (ref 0–32)
APPEARANCE UR: CLEAR
AST SERPL-CCNC: 17 IU/L (ref 0–40)
BACTERIA #/AREA URNS HPF: ABNORMAL /[HPF]
BASOPHILS # BLD AUTO: 0 X10E3/UL (ref 0–0.2)
BASOPHILS NFR BLD AUTO: 0 %
BILIRUB DIRECT SERPL-MCNC: 0.09 MG/DL (ref 0–0.4)
BILIRUB SERPL-MCNC: 0.2 MG/DL (ref 0–1.2)
BILIRUB UR QL STRIP: NEGATIVE
BUN SERPL-MCNC: 18 MG/DL (ref 8–27)
BUN/CREAT SERPL: 26 (ref 12–28)
CALCIUM SERPL-MCNC: 9.8 MG/DL (ref 8.7–10.3)
CASTS URNS QL MICRO: ABNORMAL /LPF
CHLORIDE SERPL-SCNC: 105 MMOL/L (ref 96–106)
CHOLEST SERPL-MCNC: 197 MG/DL (ref 100–199)
CO2 SERPL-SCNC: 23 MMOL/L (ref 20–29)
COLOR UR: YELLOW
CREAT SERPL-MCNC: 0.7 MG/DL (ref 0.57–1)
EOSINOPHIL # BLD AUTO: 0.1 X10E3/UL (ref 0–0.4)
EOSINOPHIL NFR BLD AUTO: 2 %
EPI CELLS #/AREA URNS HPF: ABNORMAL /HPF (ref 0–10)
ERYTHROCYTE [DISTWIDTH] IN BLOOD BY AUTOMATED COUNT: 12 % (ref 12.3–15.4)
EST. AVERAGE GLUCOSE BLD GHB EST-MCNC: 120 MG/DL
GLUCOSE SERPL-MCNC: 90 MG/DL (ref 65–99)
GLUCOSE UR QL: NEGATIVE
HBA1C MFR BLD: 5.8 % (ref 4.8–5.6)
HCT VFR BLD AUTO: 46.1 % (ref 34–46.6)
HDLC SERPL-MCNC: 52 MG/DL
HGB BLD-MCNC: 16.6 G/DL (ref 11.1–15.9)
HGB UR QL STRIP: ABNORMAL
IMM GRANULOCYTES # BLD AUTO: 0 X10E3/UL (ref 0–0.1)
IMM GRANULOCYTES NFR BLD AUTO: 0 %
KETONES UR QL STRIP: NEGATIVE
LDLC SERPL CALC-MCNC: 69 MG/DL (ref 0–99)
LEUKOCYTE ESTERASE UR QL STRIP: NEGATIVE
LYMPHOCYTES # BLD AUTO: 2.6 X10E3/UL (ref 0.7–3.1)
LYMPHOCYTES NFR BLD AUTO: 29 %
MCH RBC QN AUTO: 32.1 PG (ref 26.6–33)
MCHC RBC AUTO-ENTMCNC: 36 G/DL (ref 31.5–35.7)
MCV RBC AUTO: 89 FL (ref 79–97)
MICRO URNS: ABNORMAL
MONOCYTES # BLD AUTO: 0.6 X10E3/UL (ref 0.1–0.9)
MONOCYTES NFR BLD AUTO: 7 %
MUCOUS THREADS URNS QL MICRO: PRESENT
NEUTROPHILS # BLD AUTO: 5.6 X10E3/UL (ref 1.4–7)
NEUTROPHILS NFR BLD AUTO: 62 %
NITRITE UR QL STRIP: NEGATIVE
PH UR STRIP: 7.5 [PH] (ref 5–7.5)
PLATELET # BLD AUTO: 264 X10E3/UL (ref 150–450)
POTASSIUM SERPL-SCNC: 4.1 MMOL/L (ref 3.5–5.2)
PROT SERPL-MCNC: 6.8 G/DL (ref 6–8.5)
PROT UR QL STRIP: NEGATIVE
RBC # BLD AUTO: 5.17 X10E6/UL (ref 3.77–5.28)
RBC #/AREA URNS HPF: ABNORMAL /HPF (ref 0–2)
SODIUM SERPL-SCNC: 145 MMOL/L (ref 134–144)
SP GR UR: 1.02 (ref 1–1.03)
TRIGL SERPL-MCNC: 379 MG/DL (ref 0–149)
UROBILINOGEN UR STRIP-MCNC: 0.2 MG/DL (ref 0.2–1)
VLDLC SERPL CALC-MCNC: 76 MG/DL (ref 5–40)
WBC # BLD AUTO: 9 X10E3/UL (ref 3.4–10.8)
WBC #/AREA URNS HPF: ABNORMAL /HPF (ref 0–5)

## 2019-10-26 VITALS
DIASTOLIC BLOOD PRESSURE: 76 MMHG | SYSTOLIC BLOOD PRESSURE: 126 MMHG | BODY MASS INDEX: 37.99 KG/M2 | HEIGHT: 61 IN | RESPIRATION RATE: 20 BRPM | TEMPERATURE: 98.2 F | HEART RATE: 82 BPM | OXYGEN SATURATION: 97 % | WEIGHT: 201.2 LBS

## 2019-10-28 DIAGNOSIS — I10 ESSENTIAL HYPERTENSION: ICD-10-CM

## 2019-10-28 DIAGNOSIS — I48.20 CHRONIC ATRIAL FIBRILLATION (HCC): ICD-10-CM

## 2019-10-28 NOTE — TELEPHONE ENCOUNTER
Requested Prescriptions     Signed Prescriptions Disp Refills    rivaroxaban (XARELTO) 20 mg tab tablet 30 Tab 5     Sig: TAKE 1 TABLET BY MOUTH EVERY DAY     Authorizing Provider: Weston Perea     Ordering User: Monica Rudolph    Per Dr. Morgan Dobbs verbal orders     Future Appointments   Date Time Provider Kendrick Laws   1/9/2020  2:30 PM [facility] [address]   4/16/2020  2:00 PM Dorys Pisano  [address]

## 2019-10-29 RX ORDER — CLONIDINE 0.3 MG/24H
PATCH, EXTENDED RELEASE TRANSDERMAL
Qty: 4 PATCH | Refills: 1 | Status: SHIPPED | OUTPATIENT
Start: 2019-10-29 | End: 2019-12-27 | Stop reason: SDUPTHER

## 2019-10-29 RX ORDER — CLONIDINE 0.3 MG/24H
PATCH, EXTENDED RELEASE TRANSDERMAL
Qty: 4 PATCH | Refills: 2 | Status: SHIPPED | OUTPATIENT
Start: 2019-10-29 | End: 2020-06-17 | Stop reason: SDUPTHER

## 2019-10-30 ENCOUNTER — DOCUMENTATION ONLY (OUTPATIENT)
Dept: INTERNAL MEDICINE CLINIC | Age: 69
End: 2019-10-30

## 2019-11-06 ENCOUNTER — TELEPHONE (OUTPATIENT)
Dept: INTERNAL MEDICINE CLINIC | Age: 69
End: 2019-11-06

## 2019-11-06 NOTE — TELEPHONE ENCOUNTER
Patient states she received her recent lab results in the mail, but the result for her A1C is missing. She is scheduled to see her eye doctor tomorrow, and needs the lab value for her A1C, please call.

## 2019-11-06 NOTE — TELEPHONE ENCOUNTER
Spoke with patient she states need A1c results. Provided patient with verbal 5.8 A1c results via phone. Patient verbalized understanding. She is not active on [employer]. Computer was hacked.

## 2019-11-08 RX ORDER — LOVASTATIN 20 MG/1
TABLET ORAL
Qty: 90 TAB | Refills: 0 | Status: SHIPPED | OUTPATIENT
Start: 2019-11-08 | End: 2020-04-30 | Stop reason: SDUPTHER

## 2019-12-10 DIAGNOSIS — I10 ESSENTIAL HYPERTENSION: ICD-10-CM

## 2019-12-10 DIAGNOSIS — I48.20 CHRONIC ATRIAL FIBRILLATION (HCC): ICD-10-CM

## 2019-12-10 RX ORDER — LOVASTATIN 20 MG/1
TABLET ORAL
Qty: 90 TAB | Refills: 0 | Status: SHIPPED | OUTPATIENT
Start: 2019-12-10 | End: 2020-04-09

## 2019-12-10 NOTE — TELEPHONE ENCOUNTER
Pharmacy confirmed. Patient stated that the person that picks her meds up for her can ONLY pick them up today at 2pm. Please advise.

## 2019-12-10 NOTE — TELEPHONE ENCOUNTER
Requested Prescriptions     Signed Prescriptions Disp Refills    rivaroxaban (XARELTO) 20 mg tab tablet 30 Tab 5     Sig: TAKE 1 TABLET BY MOUTH EVERY DAY     Authorizing Provider: Jacqueline Segura     Ordering User: Harjeet Osorio verbal orders

## 2019-12-27 DIAGNOSIS — I10 ESSENTIAL HYPERTENSION: Primary | ICD-10-CM

## 2019-12-27 DIAGNOSIS — I10 ESSENTIAL HYPERTENSION: ICD-10-CM

## 2019-12-27 DIAGNOSIS — I48.20 CHRONIC ATRIAL FIBRILLATION (HCC): ICD-10-CM

## 2019-12-27 RX ORDER — CLONIDINE 0.3 MG/24H
PATCH, EXTENDED RELEASE TRANSDERMAL
Qty: 12 PATCH | Refills: 1 | Status: SHIPPED | OUTPATIENT
Start: 2019-12-27 | End: 2020-04-09

## 2019-12-27 RX ORDER — VERAPAMIL HYDROCHLORIDE 120 MG/1
TABLET, FILM COATED, EXTENDED RELEASE ORAL
Qty: 180 TAB | Refills: 0 | Status: SHIPPED | OUTPATIENT
Start: 2019-12-27 | End: 2020-04-09

## 2019-12-27 NOTE — TELEPHONE ENCOUNTER
Requested Prescriptions     Signed Prescriptions Disp Refills    rivaroxaban (XARELTO) 20 mg tab tablet 90 Tab 0     Sig: TAKE 1 TABLET BY MOUTH EVERY DAY     Authorizing Provider: Lord Candelario     Ordering User: eJromy Hicks    Per Dr. Valeria Ashby verbal orders

## 2019-12-27 NOTE — TELEPHONE ENCOUNTER
Pharmacy confirmed. Patient wants a 3 months supply being that she gets her medication at a low rate until the first of the year. However after Dec 31st the price will be expensive. Catastrophic medicare. Please refill ASAP.       Phone:[phone]

## 2019-12-27 NOTE — TELEPHONE ENCOUNTER
RX:  Catates patches  Pt needs refills and would like to  on Monday. Wants a 90 day supply.   Advise - [phone]

## 2020-01-09 ENCOUNTER — HOSPITAL ENCOUNTER (OUTPATIENT)
Dept: MAMMOGRAPHY | Age: 70
Discharge: HOME | End: 2020-01-09
Attending: INTERNAL MEDICINE
Payer: MEDICARE

## 2020-01-09 DIAGNOSIS — Z12.31 VISIT FOR SCREENING MAMMOGRAM: ICD-10-CM

## 2020-01-09 PROCEDURE — 77063 BREAST TOMOSYNTHESIS BI: CPT

## 2020-02-12 ENCOUNTER — TELEPHONE (OUTPATIENT)
Dept: SLEEP MEDICINE | Age: 70
End: 2020-02-12

## 2020-02-12 NOTE — TELEPHONE ENCOUNTER
Patient called stating she is waking up every hour and having a difficult time sleeping, she said it doesn't seem like there is enough air coming out, wants her pressure changed to a 11 or 12 . Please Advise.

## 2020-02-13 ENCOUNTER — TELEPHONE (OUTPATIENT)
Dept: SLEEP MEDICINE | Age: 70
End: 2020-02-13

## 2020-02-13 NOTE — TELEPHONE ENCOUNTER
Patient called concerning waking up. [facility] to ensure there is not issues. Patient had numerous questions concerning why she was waking up. Advised her that she can make an OV with Dr. Neha Hood or come in for PAP clinic so her settings can be assessed. Patient did not want to wait for OV appointment with Dr. Neha Hood but also did not have a ride until March to bring PAP machine. Patient transferred to front office to schedule PAP clinic.

## 2020-02-25 ENCOUNTER — TELEPHONE (OUTPATIENT)
Dept: CARDIOLOGY CLINIC | Age: 70
End: 2020-02-25

## 2020-02-25 DIAGNOSIS — R00.1 BRADYCARDIA: ICD-10-CM

## 2020-02-25 DIAGNOSIS — I48.20 CHRONIC ATRIAL FIBRILLATION (HCC): Primary | ICD-10-CM

## 2020-02-25 NOTE — TELEPHONE ENCOUNTER
Patient is requesting to speak with Dr Mandi Dunlap or Neva Babb, she states that she is not feeling well. No further details were given.      Phone: [phone]

## 2020-02-25 NOTE — TELEPHONE ENCOUNTER
Called patient. Verified patient's identity with two identifiers. Patient stated her HR has been running low and she does not feel good. Her HR runs 40's- 90's. She said her BP was around 170/90 earlier, but usually 140's/90's. Patient stated she he thinks Dr. Elliott Dang told her she would not need pacemaker unless she started passing out due to low HR. She also mentioned he said she could take 4 benazpril tabs at once if BP elevated. I suggested she not try 4 all at once if she is to need an extra dose. Had a long discussion with patient. She has a difficult time with transportation. Sounds like mailing an event monitor may help resolve HR concern with patient and she will continue monitoring BP. She also mentioned gaining 14 pounds since having to put her cat down, but this was before the holidays. Also advised she watch salt, which she said she does. I will call her back and we will also consider moving April appt to March.

## 2020-02-26 NOTE — TELEPHONE ENCOUNTER
Called patient. Notified her Dr. Papa Lew said okay to order monitor. Explained monitor again. Explained [employer] gets auth from insurance, but I suggest calling billing # to verify there cost/ deductible. Offered # to call billing, but patient said she did not need it. There is nurse who sometimes helps her with things, such as driving her to dentist appointments, that she thinks will elp her apply monitor if needed and return it to [employer] once completed. Discussed signs and symptoms qualifying urgent care or call to office. Patient verbalized understanding and denied further questions or concerns.

## 2020-02-26 NOTE — TELEPHONE ENCOUNTER
Event monitor is good idea. if we move appt, need to coordinate with dr Alix Ballard since she usually sees him same day.  Also, I never told her she could take 4 bp pills at the same time

## 2020-02-27 ENCOUNTER — CLINICAL SUPPORT (OUTPATIENT)
Dept: CARDIOLOGY CLINIC | Age: 70
End: 2020-02-27

## 2020-02-27 DIAGNOSIS — I48.20 CHRONIC ATRIAL FIBRILLATION (HCC): ICD-10-CM

## 2020-02-27 DIAGNOSIS — R00.1 BRADYCARDIA: ICD-10-CM

## 2020-03-09 ENCOUNTER — TELEPHONE (OUTPATIENT)
Dept: CARDIOLOGY CLINIC | Age: 70
End: 2020-03-09

## 2020-03-09 ENCOUNTER — DOCUMENTATION ONLY (OUTPATIENT)
Dept: CARDIOLOGY CLINIC | Age: 70
End: 2020-03-09

## 2020-03-11 ENCOUNTER — PATIENT OUTREACH (OUTPATIENT)
Dept: CASE MANAGEMENT | Age: 70
End: 2020-03-11

## 2020-03-11 NOTE — PROGRESS NOTES
Care transitions nurse -    Referral from provider - concerns re: pt's medication administration -   Pt's last office visit to pcp and cardiology - 10/22/29 - Dr. Gayla Talley / Dr. Javad Villalobos -     Ms. King verbalized her own action system to taking her bp meds - she was advised to take as rx. She is now calling the office with questions about medication administration -     Attempt to reach pt - phone asks for a code -    Plan: Pt needs follow up appt - cardiology or pcp    Pt needs comprehensive med rec - meds and dosing, and frequency. Pt needs to check bp am/ pm - and call to cardiology. Options for home check - home health nursing visit x1 or [facility] -   Safety visit - if needed. Note to provider - will continue to try to reach pt to  and confer.     Tyrel Hurtado RN, [facility], [facility]  Care transitions nurse [phone]  [facility] Coordination Team

## 2020-04-03 ENCOUNTER — TELEPHONE (OUTPATIENT)
Dept: CARDIOLOGY CLINIC | Age: 70
End: 2020-04-03

## 2020-04-03 NOTE — TELEPHONE ENCOUNTER
Unable to leave message, if pt calls back needs to be scheduled for a virtual visit with Dr. Rachel Martini, please send call to [facility].

## 2020-04-08 ENCOUNTER — DOCUMENTATION ONLY (OUTPATIENT)
Dept: CARDIOLOGY CLINIC | Age: 70
End: 2020-04-08

## 2020-04-08 NOTE — TELEPHONE ENCOUNTER
[place] tried calling patient to r/s today's \"phone call only\" with Dr. Luly Dangelo to tomorrow morning 4/9. I patient calls back, please schedule for \"phone call visit\" on 4/9/2020 in the morning.

## 2020-04-08 NOTE — PROGRESS NOTES
External loop monitor with 3 second pause AFIB 3/9/2020.   Verapamil had been bid but reduced to every day

## 2020-04-09 ENCOUNTER — VIRTUAL VISIT (OUTPATIENT)
Dept: CARDIOLOGY CLINIC | Age: 70
End: 2020-04-09

## 2020-04-09 ENCOUNTER — DOCUMENTATION ONLY (OUTPATIENT)
Dept: CARDIOLOGY CLINIC | Age: 70
End: 2020-04-09

## 2020-04-09 VITALS
HEART RATE: 83 BPM | DIASTOLIC BLOOD PRESSURE: 88 MMHG | HEIGHT: 61 IN | SYSTOLIC BLOOD PRESSURE: 141 MMHG | WEIGHT: 206 LBS | BODY MASS INDEX: 38.89 KG/M2

## 2020-04-09 DIAGNOSIS — E78.2 MIXED HYPERLIPIDEMIA: ICD-10-CM

## 2020-04-09 DIAGNOSIS — E66.01 SEVERE OBESITY (BMI 35.0-39.9) WITH COMORBIDITY (HCC): ICD-10-CM

## 2020-04-09 DIAGNOSIS — F41.9 ANXIETY: ICD-10-CM

## 2020-04-09 DIAGNOSIS — G47.33 OSA (OBSTRUCTIVE SLEEP APNEA): ICD-10-CM

## 2020-04-09 DIAGNOSIS — I10 ESSENTIAL HYPERTENSION: Primary | ICD-10-CM

## 2020-04-09 DIAGNOSIS — I48.20 CHRONIC ATRIAL FIBRILLATION (HCC): ICD-10-CM

## 2020-04-09 RX ORDER — VITAMIN E 1000 UNIT
1000 CAPSULE ORAL DAILY
COMMUNITY
End: 2021-01-07 | Stop reason: SDUPTHER

## 2020-04-09 NOTE — PROGRESS NOTES
David Corral is a 68 y.o. female evaluated via telephone on 4/9/2020. Consent:  She and/or health care decision maker is aware that that she may receive a bill for this telephone service, depending on her insurance coverage, and has provided verbal consent to proceed: Yes      Documentation:  Pt was contacted regarding appointment rescheduling and they requested a telephone appointment. Details of this discussion including any medical advice provided:     Ms. Silvestre nAsari event monitor showed some pauses early on, which resolved after she stopped taking verapamil. We reviewed these results in detail and I reassured her that she does not need a pacemaker. Her blood pressures generally under good control though she occasionally has to take an extra benazepril.she is completely housebound and has been getting some food and supplies some local churches. She continues to sleep poorly. She had an episode the other day when she had a coughing spell and was very short of breath for a minute or 2 but that has not recurred. current treatment plan is effective, no change in therapy  lab results and schedule of future lab studies reviewed with patient  reviewed diet, exercise and weight control    I affirm this is a Patient Initiated Episode with an Established Patient who has not had a related appointment within my department in the past 7 days or scheduled within the next 24 hours.     Total Time: minutes: 11-20 minutes    Note: not billable if this call serves to triage the patient into an appointment for the relevant concern      Bartolo Rush MD

## 2020-04-09 NOTE — PROGRESS NOTES
Refill for xarelto sent to pharmacy. Tried calling patient several times to schedule 6 month f/u, but line was busy each time so unable to leave message. Sent patient [employer] message with appointment date/time stating she can call back to r/s if it does not work for her.

## 2020-04-10 ENCOUNTER — TELEPHONE (OUTPATIENT)
Dept: CARDIOLOGY CLINIC | Age: 70
End: 2020-04-10

## 2020-04-10 NOTE — LETTER
4/10/2020 Ms. Lane Alpers [address] [place] [phone] Dear Ms. Yodere Vickie, We were unable to reach you by phone to schedule your 6 month follow up appointment. We went ahead and scheduled your appointment for Thursday, October 8th 2020 at 01:40 PM. If this does not work for you, please call back to reschedule to a different date/time. Sincerely, Yves Adhikari MD

## 2020-04-30 RX ORDER — BENAZEPRIL HYDROCHLORIDE 10 MG/1
10 TABLET ORAL 2 TIMES DAILY
Qty: 90 TAB | Refills: 0 | Status: SHIPPED | OUTPATIENT
Start: 2020-04-30 | End: 2020-08-10

## 2020-04-30 RX ORDER — LOVASTATIN 20 MG/1
20 TABLET ORAL DAILY
Qty: 90 TAB | Refills: 0 | Status: SHIPPED | OUTPATIENT
Start: 2020-04-30 | End: 2020-07-02 | Stop reason: SDUPTHER

## 2020-06-17 RX ORDER — CLONIDINE 0.3 MG/24H
PATCH, EXTENDED RELEASE TRANSDERMAL
Qty: 4 PATCH | Refills: 0 | Status: SHIPPED | OUTPATIENT
Start: 2020-06-17 | End: 2020-10-28 | Stop reason: SDUPTHER

## 2020-06-24 ENCOUNTER — TELEPHONE (OUTPATIENT)
Dept: INTERNAL MEDICINE CLINIC | Age: 70
End: 2020-06-24

## 2020-06-24 NOTE — TELEPHONE ENCOUNTER
Patient asking if Dr. Trevon Ruiz will have Barrett Tyler come to her apartment Friday or Saturday evening around 7pm to check her A1C, urinalysis for possible uti and b/p check.   Patient rescheduled her appt from 6/25 to Sept.

## 2020-06-24 NOTE — TELEPHONE ENCOUNTER
Advised pt MD recommends she call [facility]. Given pt number. They will check for uti and BP. he doubts they will do A1C.

## 2020-07-02 RX ORDER — LOVASTATIN 20 MG/1
20 TABLET ORAL DAILY
Qty: 90 TAB | Refills: 0 | Status: SHIPPED | OUTPATIENT
Start: 2020-07-02 | End: 2020-12-10

## 2020-07-02 NOTE — TELEPHONE ENCOUNTER
Requested Prescriptions     Pending Prescriptions Disp Refills    lovastatin (MEVACOR) 20 mg tablet 90 Tab 0     Sig: Take 1 Tab by mouth daily.        [facility] [address], [address]

## 2020-07-07 ENCOUNTER — TELEPHONE (OUTPATIENT)
Dept: SLEEP MEDICINE | Age: 70
End: 2020-07-07

## 2020-07-07 DIAGNOSIS — G47.33 OBSTRUCTIVE SLEEP APNEA (ADULT) (PEDIATRIC): Primary | ICD-10-CM
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
## 2020-07-08 ENCOUNTER — DOCUMENTATION ONLY (OUTPATIENT)
Dept: SLEEP MEDICINE | Age: 70
End: 2020-07-08

## 2020-07-20 ENCOUNTER — TELEPHONE (OUTPATIENT)
Dept: CARDIOLOGY CLINIC | Age: 70
End: 2020-07-20

## 2020-07-20 NOTE — TELEPHONE ENCOUNTER
Pt requesting to speak with Moustapha Anthony in regards to a question about her medication; no further details given, pt says it complicated. Please call pt after 12 pm on tomorrow if can't call today.       Phone:[phone]

## 2020-07-21 NOTE — TELEPHONE ENCOUNTER
Called patient. Verified patient's identity with two identifiers. She said her BP when she wakes up is around 160/100. She thinks it is because her cpap is not working. Her appointment is with NP at sleep clinic for [facility] on August 4th. She uses clonidine patch and is taking lotensin, max 4 tabs. After taking lotensin during the day, /85 HR 60's-70's. She asked if Dr. Masha Hargrove could prescribe new med that will not lower heart rate. I told her I'd message him and call back tomorrow. She also asked if Dr. Masha Hargrove could convince someone at the sleep clinic to order new cpap. I advised she call and request sooner appointment.

## 2020-07-22 NOTE — TELEPHONE ENCOUNTER
Did you mean prescribe something to lower bp?  Would not want slower heart rate, that is why we changed meds in the past. As far a bp goes, would like to wait till cpap is checked since that might be the problem and dont want to over medicate her

## 2020-07-22 NOTE — TELEPHONE ENCOUNTER
Called patient. Notified her of Dr. Huitron Brood message. Patient verbalized understanding and denied further questions or concerns.

## 2020-08-04 ENCOUNTER — VIRTUAL VISIT (OUTPATIENT)
Dept: SLEEP MEDICINE | Age: 70
End: 2020-08-04

## 2020-08-04 ENCOUNTER — DOCUMENTATION ONLY (OUTPATIENT)
Dept: SLEEP MEDICINE | Age: 70
End: 2020-08-04

## 2020-08-04 DIAGNOSIS — G47.33 OBSTRUCTIVE SLEEP APNEA (ADULT) (PEDIATRIC): Primary | ICD-10-CM

## 2020-08-04 NOTE — PATIENT INSTRUCTIONS
[address] [facility] [place]. 1668 [facility], [address] Tel.  [phone] Fax. [address] [facility] [address] [place], [address] [facility] [address] Tel.  [phone] Fax. [phone] 9[address] Tel.  [phone] Fax. [phone] Learning About CPAP for Sleep Apnea What is CPAP? CPAP is a small machine that you use at home every night while you sleep. It increases air pressure in your throat to keep your airway open. When you have sleep apnea, this can help you sleep better so you feel much better. CPAP stands for \"continuous positive airway pressure. \" The CPAP machine will have one of the following: · A mask that covers your nose and mouth · Prongs that fit into your nose · A mask that covers your nose only, the most common type. This type is called NCPAP. The N stands for \"nasal.\" Why is it done? CPAP is usually the best treatment for obstructive sleep apnea. It is the first treatment choice and the most widely used. Your doctor may suggest CPAP if you have: · Moderate to severe sleep apnea. · Sleep apnea and coronary artery disease (CAD) or heart failure. How does it help? · CPAP can help you have more normal sleep, so you feel less sleepy and more alert during the daytime. · CPAP may help keep heart failure or other heart problems from getting worse. · NCPAP may help lower your blood pressure. · If you use CPAP, your bed partner may also sleep better because you are not snoring or restless. What are the side effects? Some people who use CPAP have: · A dry or stuffy nose and a sore throat. · Irritated skin on the face. · Sore eyes. · Bloating. If you have any of these problems, work with your doctor to fix them. Here are some things you can try: · Be sure the mask or nasal prongs fit well. · See if your doctor can adjust the pressure of your CPAP. · If your nose is dry, try a humidifier. · If your nose is runny or stuffy, try decongestant medicine or a steroid nasal spray. If these things do not help, you might try a different type of machine. Some machines have air pressure that adjusts on its own. Others have air pressures that are different when you breathe in than when you breathe out. This may reduce discomfort caused by too much pressure in your nose. Where can you learn more? Go to [employer] Enter I940 in the search box to learn more about \"Learning About CPAP for Sleep Apnea. \"  
© 7929-9257 Healthwise, Incorporated. Care instructions adapted under license by [employer] (which disclaims liability or warranty for this information). This care instruction is for use with your licensed healthcare professional. If you have questions about a medical condition or this instruction, always ask your healthcare professional. [address] any warranty or liability for your use of this information. Content Version: 5.1.03057; Last Revised: January 11, 2010 PROPER SLEEP HYGIENE What to avoid · Do not have drinks with caffeine, such as coffee or black tea, for 8 hours before bed. · Do not smoke or use other types of tobacco near bedtime. Nicotine is a stimulant and can keep you awake. · Avoid drinking alcohol late in the evening, because it can cause you to wake in the middle of the night. · Do not eat a big meal close to bedtime. If you are hungry, eat a light snack. · Do not drink a lot of water close to bedtime, because the need to urinate may wake you up during the night. · Do not read or watch TV in bed. Use the bed only for sleeping and sexual activity. What to try · Go to bed at the same time every night, and wake up at the same time every morning. Do not take naps during the day. · Keep your bedroom quiet, dark, and cool. · Get regular exercise, but not within 3 to 4 hours of your bedtime. Elma Odom · Sleep on a comfortable pillow and mattress. · If watching the clock makes you anxious, turn it facing away from you so you cannot see the time. · If you worry when you lie down, start a worry book. Well before bedtime, write down your worries, and then set the book and your concerns aside. · Try meditation or other relaxation techniques before you go to bed. · If you cannot fall asleep, get up and go to another room until you feel sleepy. Do something relaxing. Repeat your bedtime routine before you go to bed again. · Make your house quiet and calm about an hour before bedtime. Turn down the lights, turn off the TV, log off the computer, and turn down the volume on music. This can help you relax after a busy day. Drowsy Driving: The [employer] cites drowsiness as a causing factor in more than 190,036 police reported crashes annually, resulting in 76,000 injuries and 1,500 deaths. Other surveys suggest 55% of people polled have driven while drowsy in the past year, 23% had fallen asleep but not crashed, 3% crashed, and 2% had and accident due to drowsy driving. Who is at risk? Young Drivers: One study of drowsy driving accidents states that 55% of the drivers were under 25 years. Of those, 75% were male. Shift Workers and Travelers: People who work overnight or travel across time zones frequently are at higher risk of experiencing Circadian Rhythm Disorders. They are trying to work and function when their body is programed to sleep. Sleep Deprived: Lack of sleep has a serious impact on your ability to pay attention or focus on a task. Consistently getting less than the average of 8 hours your body needs creates partial or cumulative sleep deprivation.   
Untreated Sleep Disorders: Sleep Apnea, Narcolepsy, R.L.S., and other sleep disorders (untreated) prevent a person from getting enough restful sleep. This leads to excessive daytime sleepiness and increases the risk for drowsy driving accidents by up to 7 times. Medications / Alcohol: Even over the counter medications can cause drowsiness. Medications that impair a drivers attention should have a warning label. Alcohol naturally makes you sleepy and on its own can cause accidents. Combined with excessive drowsiness its effects are amplified. Signs of Drowsy Driving: * You don't remember driving the last few miles * You may drift out of your wilma * You are unable to focus and your thoughts wander * You may yawn more often than normal 
 * You have difficulty keeping your eyes open / nodding off * Missing traffic signs, speeding, or tailgating Prevention-  
Good sleep hygiene, lifestyle and behavioral choices have the most impact on drowsy driving. There is no substitute for sleep and the average person requires 8 hours nightly. If you find yourself driving drowsy, stop and sleep. Consider the sleep hygiene tips provided during your visit as well. Medication Refill Policy: Refills for all medications require 1 week advance notice. Please have your pharmacy fax a refill request. We are unable to fax, or call in \"controled substance\" medications and you will need to pick these prescriptions up from our office. [facility] Activation Thank you for requesting access to [facility]. Please follow the instructions below to securely access and download your online medical record. [facility] allows you to send messages to your doctor, view your test results, renew your prescriptions, schedule appointments, and more. How Do I Sign Up? 1. In your internet browser, go to [email or web address]. 2. Click on the First Time User? Click Here link in the Sign In box. You will see the New Member Sign Up page. 3. Enter your [facility] Access Code exactly as it appears below.  You will not need to use this code after youve completed the sign-up process. If you do not sign up before the expiration date, you must request a new code. [employer] Access Code: Activation code not generated Current [facility] Status: Active (This is the date your [employer] access code will ) 4. Enter the last four digits of your Social Security Number (xxxx) and Date of Birth (mm/dd/yyyy) as indicated and click Submit. You will be taken to the next sign-up page. 5. Create a Rekoot ID. This will be your [employer] login ID and cannot be changed, so think of one that is secure and easy to remember. 6. Create a [employer] password. You can change your password at any time. 7. Enter your Password Reset Question and Answer. This can be used at a later time if you forget your password. 8. Enter your e-mail address. You will receive e-mail notification when new information is available in [address]. 9. Click Sign Up. You can now view and download portions of your medical record. 10. Click the Download Summary menu link to download a portable copy of your medical information. Additional Information If you have questions, please call [phone]. Remember, [employer] is NOT to be used for urgent needs. For medical emergencies, dial 911.
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
## 2020-08-10 RX ORDER — BENAZEPRIL HYDROCHLORIDE 10 MG/1
TABLET ORAL
Qty: 60 TAB | Refills: 4 | Status: SHIPPED | OUTPATIENT
Start: 2020-08-10 | End: 2020-12-17 | Stop reason: SDUPTHER

## 2020-10-09 ENCOUNTER — TELEPHONE (OUTPATIENT)
Dept: INTERNAL MEDICINE CLINIC | Age: 70
End: 2020-10-09

## 2020-10-09 NOTE — TELEPHONE ENCOUNTER
Patient called to let Dr. Xochitl Lanier know she had a dental emergency yesterday and totally forgot to call and cancel her appt with him. Has rescheduled for Nov 3rd.

## 2020-11-01 RX ORDER — CLONIDINE 0.3 MG/24H
PATCH, EXTENDED RELEASE TRANSDERMAL
Qty: 4 PATCH | Refills: 5 | Status: SHIPPED | OUTPATIENT
Start: 2020-11-01 | End: 2020-11-03

## 2020-11-03 RX ORDER — CLONIDINE 0.3 MG/24H
PATCH, EXTENDED RELEASE TRANSDERMAL
Qty: 4 PATCH | Refills: 0 | Status: SHIPPED | OUTPATIENT
Start: 2020-11-03 | End: 2021-03-19 | Stop reason: SDUPTHER

## 2020-12-10 RX ORDER — LOVASTATIN 20 MG/1
TABLET ORAL
Qty: 30 TAB | Refills: 0 | Status: SHIPPED | OUTPATIENT
Start: 2020-12-10 | End: 2020-12-17 | Stop reason: SDUPTHER

## 2020-12-14 ENCOUNTER — TELEPHONE (OUTPATIENT)
Dept: INTERNAL MEDICINE CLINIC | Age: 70
End: 2020-12-14

## 2020-12-14 NOTE — TELEPHONE ENCOUNTER
Reviewing chart - last seen 10/22/19. Due to return in 6 months - 4/2020. Has appt scheduled on 1/5/21.  Will forward to MD for approval.

## 2020-12-17 NOTE — TELEPHONE ENCOUNTER
Requested Prescriptions     Pending Prescriptions Disp Refills    benazepriL (LOTENSIN) 10 mg tablet 60 Tab 4         Requested Quantity : 180.0      Abdielterrence Vieyra [address], 1000 [facility] 615 [facility]

## 2020-12-17 NOTE — TELEPHONE ENCOUNTER
Requested Prescriptions     Pending Prescriptions Disp Refills    lovastatin (MEVACOR) 20 mg tablet 30 Tab 0     Patient is asking Pharmacy for a 90 day supply      [address] [place] [address] [address], 300 [facility]

## 2020-12-18 RX ORDER — LOVASTATIN 20 MG/1
TABLET ORAL
Qty: 30 TAB | Refills: 0 | Status: SHIPPED | OUTPATIENT
Start: 2020-12-18 | End: 2021-02-22 | Stop reason: SDUPTHER

## 2020-12-18 RX ORDER — BENAZEPRIL HYDROCHLORIDE 10 MG/1
TABLET ORAL
Qty: 60 TAB | Refills: 0 | Status: SHIPPED | OUTPATIENT
Start: 2020-12-18 | End: 2020-12-18 | Stop reason: SDUPTHER

## 2020-12-22 RX ORDER — BENAZEPRIL HYDROCHLORIDE 10 MG/1
TABLET ORAL
Qty: 60 TAB | Refills: 0 | Status: SHIPPED | OUTPATIENT
Start: 2020-12-22 | End: 2021-06-14

## 2021-01-04 ENCOUNTER — TELEPHONE (OUTPATIENT)
Dept: INTERNAL MEDICINE CLINIC | Age: 71
End: 2021-01-04

## 2021-01-04 NOTE — TELEPHONE ENCOUNTER
Patient asked if Shana Arkoma would give her a call -- wants to know if you have the name(s) of any good internists who make house calls.

## 2021-01-05 NOTE — TELEPHONE ENCOUNTER
Advised pt here are 2 resources she can call:  1- Home based PCP through [facility] contact Dawson Thompson at [facility]  [phone]

## 2021-01-07 ENCOUNTER — VIRTUAL VISIT (OUTPATIENT)
Dept: CARDIOLOGY CLINIC | Age: 71
End: 2021-01-07
Payer: MEDICARE

## 2021-01-07 ENCOUNTER — TELEPHONE (OUTPATIENT)
Dept: CARDIOLOGY CLINIC | Age: 71
End: 2021-01-07

## 2021-01-07 ENCOUNTER — TELEPHONE (OUTPATIENT)
Dept: INTERNAL MEDICINE CLINIC | Age: 71
End: 2021-01-07

## 2021-01-07 DIAGNOSIS — I48.20 CHRONIC ATRIAL FIBRILLATION (HCC): Primary | ICD-10-CM

## 2021-01-07 DIAGNOSIS — G47.33 OSA (OBSTRUCTIVE SLEEP APNEA): ICD-10-CM

## 2021-01-07 DIAGNOSIS — F41.9 ANXIETY: ICD-10-CM

## 2021-01-07 DIAGNOSIS — E78.2 MIXED HYPERLIPIDEMIA: ICD-10-CM

## 2021-01-07 DIAGNOSIS — E66.01 SEVERE OBESITY (BMI 35.0-39.9) WITH COMORBIDITY (HCC): ICD-10-CM

## 2021-01-07 PROCEDURE — 99442 PR PHYS/QHP TELEPHONE EVALUATION 11-20 MIN: CPT | Performed by: SPECIALIST

## 2021-01-07 NOTE — PROGRESS NOTES
Aden Vergara is a 65 y.o. female evaluated via telephone on 1/7/2021. Consent:  She and/or health care decision maker is aware that that she may receive a bill for this telephone service, depending on her insurance coverage, and has provided verbal consent to proceed: Yes    5/14 echo normal lvef, lae  5/14 lexiscan cardiolyte stress neg, lvef 70%  3/20 event monitor, 3 pauses max 3.3 secs, resolved after stopping verapamil, otherwise afib/flutter with controlled rate     Current Outpatient Medications on File Prior to Visit   Medication Sig    benazepriL (LOTENSIN) 10 mg tablet TAKE ONE TABLET BY MOUTH TWICE A DAY    lovastatin (MEVACOR) 20 mg tablet TAKE ONE TABLET BY MOUTH DAILY    cloNIDine (Catapres-TTS-3) 0.3 mg/24 hr APPLY 1 PATCH EVERY 8 DAYS AS DIRECTED.  rivaroxaban (Xarelto) 20 mg tab tablet TAKE 1 TABLET BY MOUTH EVERY DAY    CLINPRO 5000 1.1 % pste     brinzolamide-brimonidine (SIMBRINZA) 1-0.2 % drps Apply  to eye two (2) times a day.  ascorbic acid (VITAMIN C) 500 mg tablet Take 1,000 mg by mouth nightly.  Cholecalciferol, Vitamin D3, 3,000 unit tab Take 3,000 Units by mouth daily.  CALCIUM CARBONATE (TUMS PO) Take  by mouth as needed. No current facility-administered medications on file prior to visit. Documentation:  Pt was contacted regarding appointment rescheduling and they requested a telephone appointment. Details of this discussion including any medical advice provided:   She is still struggling with isolation and her inability to get out of her apartment. She is concerned because since she has trouble making in person visits that she will be able to continue to get her medications and she also wants someone to listen to her heart and lungs. Sometimes her blood pressure is low enough that she does not even need to take any benazepril and occasionally she will need to take an extra 1. She is not getting any real activity.     Overall it sounds like things are stable from a cardiac perspective and she is on the right medications. I told her we will try to work with her going forward if she is able to get a ride and just gives us 24 to 48 hours notice we should be able to work her in.      negative for chest pain, dyspnea, palpitations, syncope, orthopnea, paroxysmal nocturnal dyspnea, exertional chest pressure/discomfort, claudication, lower extremity edema    current treatment plan is effective, no change in therapy  lab results and schedule of future lab studies reviewed with patient  reviewed diet, exercise and weight control    I affirm this is a Patient Initiated Episode with an Established Patient who has not had a related appointment within my department in the past 7 days or scheduled within the next 24 hours.     Total Time: minutes: 11-20 minutes    Note: not billable if this call serves to triage the patient into an appointment for the relevant concern      Lisa Drew MD

## 2021-01-07 NOTE — TELEPHONE ENCOUNTER
1/7/2021 at 10:57 called patient to register/triage for today's virtual with Dr. Luly Dangelo no answer and no voicemail

## 2021-01-07 NOTE — TELEPHONE ENCOUNTER
Patient asked that Rukhsananassami Hitchcock call her on Monday to tell her whether she should get the covid vaccine --- says they are supposed to be getting them on Tues.

## 2021-01-07 NOTE — PATIENT INSTRUCTIONS
Your follow up appointment to come back to see Dr. Ernesto Morrison in 6 months is scheduled for Tuesday, July 6th at 2:00 pm in the office. Please call office if you are able to come in sooner for appointment.

## 2021-01-11 ENCOUNTER — TELEPHONE (OUTPATIENT)
Dept: INTERNAL MEDICINE CLINIC | Age: 71
End: 2021-01-11

## 2021-02-19 ENCOUNTER — TELEPHONE (OUTPATIENT)
Dept: CARDIOLOGY CLINIC | Age: 71
End: 2021-02-19

## 2021-02-19 NOTE — TELEPHONE ENCOUNTER
Patient states that she needs Dr. Michelle Desai to prescribe lovastatin, hasn't seen internist in a while and was told Dr. Michelle Desai could. Patient requesting a call back today since she is running out.      Phone: [phone]

## 2021-02-22 RX ORDER — LOVASTATIN 20 MG/1
TABLET ORAL
Qty: 90 TAB | Refills: 1 | Status: SHIPPED | OUTPATIENT
Start: 2021-02-22 | End: 2021-08-10

## 2021-02-22 NOTE — TELEPHONE ENCOUNTER
Requested Prescriptions     Signed Prescriptions Disp Refills    lovastatin (MEVACOR) 20 mg tablet 90 Tab 1     Sig: TAKE ONE TABLET BY MOUTH DAILY     Authorizing Provider: Wil Contreras     Ordering User: Emily Serrano     Written order per Dr. Laurance Hatchet.     Future Appointments   Date Time Provider Kendrick Laws   3/3/2021  3:30 PM Helena Bear MD [facility] ANTONIO ECKERT AMB   7/6/2021  2:00 PM MD EMILI Arias BS AMB

## 2021-02-22 NOTE — TELEPHONE ENCOUNTER
Patient following up on previous call. States she only has a couple left and the dosage is 20 mg.     Phone: [phone]

## 2021-03-01 ENCOUNTER — TELEPHONE (OUTPATIENT)
Dept: CARDIOLOGY CLINIC | Age: 71
End: 2021-03-01

## 2021-03-01 NOTE — TELEPHONE ENCOUNTER
Doubt head throbbing is related to bp. She may want to consider taking med every day for a while if bp consistently up. Could be some other reason for throbbing and if doesn't improve, she should go to the er and get checked, especially if develops any neurological symptoms.

## 2021-03-01 NOTE — TELEPHONE ENCOUNTER
Identifiers x 2. Patient states blood pressures of 159/93 and 158/95. Took 2 tablets of benazapril this am.  BP decreased to 132/85. HR in 80s. States taking benazapril prn for diastolic greater than 75. She is scared/concerned regarding head \"throbbing\". Would like to make Dr. Nell Lassiter aware. States unable to go to upcoming appt with Dr. Paulina Wyatt due to not having transportation.

## 2021-03-01 NOTE — TELEPHONE ENCOUNTER
Identifiers x 2. Informed of Dr. Nicolás Carpenter recommendation. Offered earlier appointment with Dr. Leodan Strauss. Does not have transportation. Encourage her to call office in 1-2 weeks with update. Verbalized understanding.

## 2021-03-19 ENCOUNTER — TELEPHONE (OUTPATIENT)
Dept: CARDIOLOGY CLINIC | Age: 71
End: 2021-03-19

## 2021-03-19 DIAGNOSIS — I10 ESSENTIAL HYPERTENSION: Primary | ICD-10-CM

## 2021-03-19 RX ORDER — CLONIDINE 0.3 MG/24H
PATCH, EXTENDED RELEASE TRANSDERMAL
Qty: 4 PATCH | Refills: 0 | Status: SHIPPED | OUTPATIENT
Start: 2021-03-19 | End: 2021-03-22 | Stop reason: SDUPTHER

## 2021-03-19 NOTE — TELEPHONE ENCOUNTER
It was on med list so sent refill. Requested Prescriptions     Signed Prescriptions Disp Refills    cloNIDine (Catapres-TTS-3) 0.3 mg/24 hr 4 Patch 0     Sig: APPLY 1 PATCH EVERY 8 DAYS AS DIRECTED.      Authorizing Provider: Tomas Bedoya     Ordering User: Jaida Alberto    Per Dr. Ellen Alvarez verbal orders

## 2021-03-19 NOTE — TELEPHONE ENCOUNTER
Patient is requesting a refill on her Catapres Patch 0.3 mg. Did not see listed on med list. Patient states that Dr. Tamiko Ramirez told her that he would refill this for her as she has problems with transportation and can not make it to the prescribing doctor.      Pharmacy confirmed

## 2021-03-22 ENCOUNTER — TELEPHONE (OUTPATIENT)
Dept: CARDIOLOGY CLINIC | Age: 71
End: 2021-03-22

## 2021-03-22 DIAGNOSIS — I10 ESSENTIAL HYPERTENSION: ICD-10-CM

## 2021-03-22 RX ORDER — CLONIDINE 0.3 MG/D
PATCH TRANSDERMAL
Qty: 4 PATCH | Refills: 0 | Status: SHIPPED | OUTPATIENT
Start: 2021-03-22 | End: 2021-05-18

## 2021-04-27 DIAGNOSIS — I10 ESSENTIAL HYPERTENSION: ICD-10-CM

## 2021-04-27 DIAGNOSIS — I48.20 CHRONIC ATRIAL FIBRILLATION (HCC): ICD-10-CM

## 2021-04-27 NOTE — TELEPHONE ENCOUNTER
Requested Prescriptions     Signed Prescriptions Disp Refills    rivaroxaban (Xarelto) 20 mg tab tablet 30 Tab 5     Sig: TAKE ONE TABLET BY MOUTH DAILY     Authorizing Provider: Enrique Rueda     Ordering User: Vanessa Forman    Per Dr. Stanley Swenson verbal orders

## 2021-05-15 ENCOUNTER — APPOINTMENT (OUTPATIENT)
Dept: CT IMAGING | Age: 71
End: 2021-05-15
Attending: EMERGENCY MEDICINE
Payer: MEDICARE

## 2021-05-15 ENCOUNTER — HOSPITAL ENCOUNTER (EMERGENCY)
Age: 71
Discharge: HOME | End: 2021-05-15
Attending: EMERGENCY MEDICINE | Admitting: EMERGENCY MEDICINE
Payer: MEDICARE

## 2021-05-15 VITALS
TEMPERATURE: 99.2 F | DIASTOLIC BLOOD PRESSURE: 81 MMHG | HEART RATE: 74 BPM | RESPIRATION RATE: 16 BRPM | OXYGEN SATURATION: 91 % | BODY MASS INDEX: 36.44 KG/M2 | SYSTOLIC BLOOD PRESSURE: 159 MMHG | WEIGHT: 198 LBS | HEIGHT: 62 IN

## 2021-05-15 DIAGNOSIS — H11.31 SUBCONJUNCTIVAL HEMORRHAGE OF RIGHT EYE: Primary | ICD-10-CM

## 2021-05-15 PROCEDURE — 99284 EMERGENCY DEPT VISIT MOD MDM: CPT

## 2021-05-15 PROCEDURE — 70450 CT HEAD/BRAIN W/O DYE: CPT

## 2021-05-15 RX ORDER — ACETAMINOPHEN 325 MG/1
650 TABLET ORAL ONCE
Status: DISCONTINUED | OUTPATIENT
Start: 2021-05-15 | End: 2021-05-15 | Stop reason: HOSPADM

## 2021-05-15 NOTE — ED PROVIDER NOTES
47-year-old female with history of hypertension, diabetes, depression presents with complaints of noticing blood in her right eye when putting on her glaucoma eyedrops last night. Denies trauma. Denies blood thinner use. Denies foreign body sensation. Denies vision changes. Patient also complains of throbbing headache x3 days. Denies fever, chills, nausea, vomiting, chest pain, shortness of breath.     Denies tobacco use, drug use, alcohol use   Primary CARE-damon           Past Medical History:   Diagnosis Date    Aneurysm ([facility] [address]     supraclinoid/cerebral    Arrhythmia     AFib    Arthritis     Depression     Diabetes ([facility] [address]     her doc said not she is prediabetic    Hypertension     Ill-defined condition     scoliosis    Ill-defined condition     Other ill-defined conditions(799.89)     glaucoma    Other ill-defined conditions(799.89)     increased cholesterol    Other ill-defined conditions(799.89)     scoliosis    Other ill-defined conditions(799.89)     back pain    Other ill-defined conditions(799.89)     insomnia    Other ill-defined conditions(799.89)     abnormal wt gain    Other ill-defined conditions(799.89)     breast lump - left side at 3 o'clock position    Other ill-defined conditions(799.89) 1994    shingles    Other ill-defined conditions(799.89)     CTS    Other ill-defined conditions(799.89)     colon polyps    Other ill-defined conditions(799.89)     cataracts    Other ill-defined conditions(799.89)     vitamin D deficiency - hx of    Other ill-defined conditions(799.89)     REYES    Psychiatric disorder     depression/personality disorder/OCD    Scoliosis     Sleep apnea     Unspecified sleep apnea     sleep study 15 yrs ago/was told to come back and get a CPAP, but pt did not       Past Surgical History:   Procedure Laterality Date    HX BREAST BIOPSY Right 04/12/2012    (Neg) right breast biopsy    HX GYN      endometrial ablation x 2    HX OTHER SURGICAL colonoscopy x 1    HX TONSILLECTOMY      T & A    [place] CARDIAC SURG PROCEDURE UNLIST      cardiac cath normal per pt, [facility] approx 1994         Family History:   Problem Relation Age of Onset    Diabetes Mother     Asthma Mother     Lung Disease Mother     Glaucoma Mother     Cancer Father     Heart Disease Father     Lung Disease Father     Psychiatric Disorder Father     Stroke Father     Other Sister         arthritis       Social History     Socioeconomic History    Marital status: SINGLE     Spouse name: Not on file    Number of children: Not on file    Years of education: Not on file    Highest education level: Not on file   Occupational History    Not on file   Social Needs    Financial resource strain: Not on file    Food insecurity     Worry: Not on file     Inability: Not on file    Transportation needs     Medical: Not on file     Non-medical: Not on file   Tobacco Use    Smoking status: Never Smoker    Smokeless tobacco: Never Used   Substance and Sexual Activity    Alcohol use: No     Alcohol/week: 0.0 standard drinks    Drug use: No    Sexual activity: Not on file   Lifestyle    Physical activity     Days per week: Not on file     Minutes per session: Not on file    Stress: Not on file   Relationships    Social connections     Talks on phone: Not on file     Gets together: Not on file     Attends Oriental orthodox service: Not on file     Active member of club or organization: Not on file     Attends meetings of clubs or organizations: Not on file     Relationship status: Not on file    Intimate partner violence     Fear of current or ex partner: Not on file     Emotionally abused: Not on file     Physically abused: Not on file     Forced sexual activity: Not on file   Other Topics Concern    Not on file   Social History Narrative    Not on file         ALLERGIES: Latex, Cardizem [diltiazem hcl], Ceclor [cefaclor], Coffee (coffea arabica), Flexeril [cyclobenzaprine], Levaquin [levofloxacin], Ludiomil, Macrodantin [nitrofurantoin macrocrystalline], Other medication, Plendil [felodipine], Prinivil [lisinopril], Procardia [nifedipine], Provera [medroxyprogesterone], Shellfish derived, Singulair [montelukast], Tenoretic 100 [atenolol-chlorthalidone], Tenormin [atenolol], Vistaril [hydroxyzine pamoate], and Zocor [simvastatin]    Review of Systems   Constitutional: Negative for chills and fever. HENT: Negative for congestion, nosebleeds and rhinorrhea. Eyes: Positive for redness. Negative for pain. Respiratory: Negative for cough and shortness of breath. Cardiovascular: Negative for chest pain and palpitations. Gastrointestinal: Negative for abdominal pain, nausea and vomiting. Genitourinary: Negative for dysuria, frequency, vaginal bleeding and vaginal pain. Musculoskeletal: Negative for myalgias. Skin: Negative for rash and wound. Neurological: Negative for seizures, syncope and weakness. Hematological: Does not bruise/bleed easily. Psychiatric/Behavioral: Negative for agitation, confusion, dysphoric mood and suicidal ideas. The patient is not nervous/anxious. Vitals:    05/15/21 0542   Weight: 89.8 kg (198 lb)   Height: 5' 2\" (1.575 m)            Physical Exam  Vitals signs and nursing note reviewed. Constitutional:       Appearance: She is well-developed. HENT:      Head: Normocephalic and atraumatic. Eyes:      Conjunctiva/sclera:      Right eye: Hemorrhage present. Pupils: Pupils are equal, round, and reactive to light. Neck:      Musculoskeletal: Normal range of motion and neck supple. Trachea: No tracheal deviation. Cardiovascular:      Rate and Rhythm: Normal rate and regular rhythm. Heart sounds: Normal heart sounds. Pulmonary:      Effort: Pulmonary effort is normal. No respiratory distress. Breath sounds: Normal breath sounds. No stridor. No wheezing or rales. Chest:      Chest wall: No tenderness. Abdominal:      General: Bowel sounds are normal. There is no distension. Palpations: Abdomen is soft. Tenderness: There is no abdominal tenderness. There is no rebound. Musculoskeletal: Normal range of motion. General: No tenderness. Skin:     General: Skin is warm and dry. Coloration: Skin is not pale. Findings: No rash. Neurological:      Mental Status: She is alert and oriented to person, place, and time. Cranial Nerves: No cranial nerve deficit. MDM  Number of Diagnoses or Management Options  Diagnosis management comments: 75-year-old female presents with right subconjunctival hematoma. Patient anxious and complaining of her head pain throbbing and reports that she has not had her head evaluated in many years and her neurologist said that she needed a picture of her head every 2 years. Plan-visual acuity, head CT. Head CT unremarkable         Procedures      Visual acuity 20/20 left, 20/20 right, 20/20 bilateral.       7:14 AM  Patient's results have been reviewed with them. Patient and/or family have verbally conveyed their understanding and agreement of the patient's signs, symptoms, diagnosis, treatment and prognosis and additionally agree to follow up as recommended or return to the Emergency Room should their condition change prior to follow-up. Discharge instructions have also been provided to the patient with some educational information regarding their diagnosis as well a list of reasons why they would want to return to the ER prior to their follow-up appointment should their condition change.

## 2021-05-15 NOTE — ED TRIAGE NOTES
Patient brought in from [facility] elderly apartments with a CC of blood in her eye. She said that she was putting in her glaucoma eye drops and when she looked at her eye in the mirror and it was full of blood. Patient's R eye is red but not bleeding. She then took at bath and felt shaky.

## 2021-05-24 ENCOUNTER — TELEPHONE (OUTPATIENT)
Dept: INTERNAL MEDICINE CLINIC | Age: 71
End: 2021-05-24

## 2021-05-24 NOTE — TELEPHONE ENCOUNTER
PT needs list of doctor's who make house calls. Please call back    Patient states we MAY NOT respond via mychart.

## 2021-05-25 NOTE — TELEPHONE ENCOUNTER
Spoke with pt - advised her looking back in her chart I found message from 1/5/21. Gave her same information.   Advised pt here are 2 resources she can call:  1- Home based PCP through [facility] contact Tyron Ren at [address] [place] [address]  [phone]

## 2021-05-26 ENCOUNTER — TELEPHONE (OUTPATIENT)
Dept: CARDIOLOGY CLINIC | Age: 71
End: 2021-05-26

## 2021-05-26 NOTE — TELEPHONE ENCOUNTER
Patient called. Verified patient's identity with two identifiers. Patient stated he BP has been 160's/ when she waked up in the morning and her head is pounding. This is prior to taking her medication. She stated she is taking max amount of benazepril (4 tabs) and BP has still been high this week. She is worried she is going to have a heart attack or stroke. Explained her BP is not that high and the readings are before medicine. Advised she also take BP a couple hours after taking meds. She insists Dr. Zeeshan Bermudez prescribe another medicine. She will try to keep appointment scheduled for this Friday, but stated she cannot wait until then. I told her I would message Dr. Zeeshan Bermudez. She sleeps in until about 12 so I will wait to call her back tomorrow. Discussed signs and symptoms qualifying urgent care or call to office. Patient verbalized understanding and denied further questions or concerns.

## 2021-05-27 NOTE — TELEPHONE ENCOUNTER
Patient called back stating she will be her at 3pm tomorrow for her labs done    [employer]

## 2021-05-27 NOTE — TELEPHONE ENCOUNTER
Called patient back. No answer and unable to leave message. She should keep appointment for tomorrow with Dr. Rubio Murphy, bring meds, and arrive earlier than scheduled appointment time. I advised her of this on the phone earlier.

## 2021-05-27 NOTE — TELEPHONE ENCOUNTER
Patient called back. Explained Dr. Karina Virk advised she bring meds to appointment tomorrow. Patient stated she also thinks she has diabetes. I told her Dr. Karina Virk can probably give her a lab slip to check her hgb a1c when she comes in tomorrow. The phone call was disconnected. Called patient back 3 times and line rang busy.

## 2021-05-27 NOTE — TELEPHONE ENCOUNTER
We will talk on Friday. Please have her bring her medicines with her to make sure we know exactly what and how she is taking them.  thanks

## 2021-05-28 ENCOUNTER — TELEPHONE (OUTPATIENT)
Dept: CARDIOLOGY CLINIC | Age: 71
End: 2021-05-28

## 2021-05-28 ENCOUNTER — TELEPHONE (OUTPATIENT)
Dept: FAMILY MEDICINE CLINIC | Age: 71
End: 2021-05-28

## 2021-05-28 NOTE — TELEPHONE ENCOUNTER
Patient requesting to speak to the nurse, patient stating she need to discuss her blood pressure being elevated, please advise      [phone]

## 2021-05-28 NOTE — TELEPHONE ENCOUNTER
Patient canceled her appointment today. Dr. Jones People advised she should contact [facility] or one of the doctors her PCP recommended that come to her home. He also advised she has sleep apnea that could be contributing to her htn. Called patient and notified her of this. She stated she wears her cpap nightly. She said she is scared about her BP. [facility] Patient stated she has before and they canceled. Advised she try again and call me if she has problems. Patient verbalized understanding and denied further questions or concerns.

## 2021-05-28 NOTE — TELEPHONE ENCOUNTER
Home Based [facility]  Phone:  [phone]     Fax:  [phone] [employer], [address] [place] [address], [facility] Sumaya Rangel  1950    Patient called [address] [place] [address] office stating she needs a primary care provider to see her in the home as she is mostly homebound and can no longer get out to see Dr. Richy Ernst in his office. Pt states she is pre-diabetic and wants her A1C checked. ACO Registries:  ACO patient    PATIENT DEMOGRAPHICS:      Urszula Rangel  [facility], [address] [facility], [address]  [phone]  1950    PRIMARY CARE PROVIDER:  Name:  Zack Butler MD  Phone Number: [phone]    REFERRAL SOURCE CONTACT INFORMATION:  Pt self referred    DIAGNOSIS:   Chronic afib, BERNIE, major depression, scoliosis    PRIMARY CARETAKER:     Name: none    EMERGENCY CONTACT  (if different from Primary Caretaker):   Brother in [place]:  Iam García [phone]    ACP:   No ACP documents on file    Primary Healthcare Decision Maker: Iam García   Phone:  [phone]   Relationship:  brother    THE FOLLOWING QUESTIONS WERE ANSWERED BY: patient    HOME ENVIRONMENT:    Independent living apt at a [facility] community    ADL's:    independent  Medication Management:  independent  Is patient taking any opioids or benzodiazepines?  no  Transportation:    [facility]: Requires no assistance with bathing and dressing        Mobility:    Walks short distances, uses wheelchair     DME:  CPAP    HOME SERVICES:  none    NUTRITION:     Diabetic diet, does not eat \"white foods\" (rice, potatoes, pasta)      HEIGHT/WEIGHT:  5/2\"  /198 lbs    SKIN:    Intact    TOILETING:    Continent       WHAT WAS PATIENT'S FUNCTIONAL STATUS 6 MONTHS AGO? About the same    WHAT BARRIERS DO YOU HAVE TO GETTING IN TO MD OFFICE?   Pt says she has 70-80 degree Scoliosis and it is painful for her to sit up for long periods    DATE OF MOST RECENT CONTACT WITH A PROVIDER:  [facility] 5/15/21 Subconjunctival hemorrhage of right eye    DATE OF MOST RECENT PCP OFFICE VISIT:  Office visit with Dr. Garcia Done 10/22/19    DATE OF MOST RECENT SPECIALIST VISIT/UPCOMING SPECIALIST APPTS:  1/7/21 virtual visit with cardiologist Dr. Geovanny Villar; 8/4/20 virtual visit with Dr. Adrien Rosa, [facility] [address]:    5/15/21 [facility] ED  (Subconjunctival hemorrhage of right eye)    NOTES:  Pt states she is very worried about diabetes. She has not been able to get to MD office for labs. Last A1C was 5.8 on 10/22/19. This nurse explained that the [address] [place] [address] team discusses new referrals at our weekly IDG meetings. The next meeting is scheduled for 6/2/21  This nurse will present the referral to the [address] [place] [address] team on 6/2/21 and will call patient back to notify her of the decision. Pt states she sleeps late and she does not answer her phone in the mornings. This nurse explained that if pt is accepted with HBCP, providers cannot guarantee afternoon visits. Pt voices understanding. Addendum: Due to unusually high volume of [facility] referrals last week, pt's referral request has been deferred to the 6/9/21 [facility] meeting. This nurse called pt to inform her of this, no answer, no option to leave voice message. Addendum:  Discussed pt request for [facility] in team IDG on 6/9/21. Unable to accept pt at this time.       MUSA Champagne, RN-BC, [facility]  Referral Navigator  Home Based [address]  Phone:  [phone]  Fax:  [phone]  [email or web address]

## 2021-06-14 ENCOUNTER — TELEPHONE (OUTPATIENT)
Dept: CARDIOLOGY CLINIC | Age: 71
End: 2021-06-14

## 2021-06-14 RX ORDER — BENAZEPRIL HYDROCHLORIDE 10 MG/1
TABLET ORAL
Qty: 60 TABLET | Refills: 3 | Status: SHIPPED | OUTPATIENT
Start: 2021-06-14 | End: 2022-06-03

## 2021-06-14 NOTE — TELEPHONE ENCOUNTER
Unable to LM on Pts cell phone. LM on brother Greg phone to have pt call back. Pt needs to rs 7/6/2021 appt with Dr. Bailey Herrera, provider is post call.

## 2021-06-15 NOTE — TELEPHONE ENCOUNTER
Called patient. Verified patient's identity with two identifiers. Patient stated she is still upset about emergency contact being called. I told her I will make note in chart that we not contact for appointments. Patient verbalized understanding and denied further questions or concerns.

## 2021-06-15 NOTE — TELEPHONE ENCOUNTER
Called patient. No answer or voicemail to leave message. Patient called earlier because  contacted emergency contact after not reaching patient and had left message to have patient contact us. This was to r/s July appointment with Dr. Genna Manrique. Rosalina Ross transferred call to me per pt request. Verified patient's identity with two identifiers. Patient was not happy her emergency contact was called. Scheduled f/u.

## 2021-06-23 ENCOUNTER — TELEPHONE (OUTPATIENT)
Dept: CARDIOLOGY CLINIC | Age: 71
End: 2021-06-23

## 2021-06-23 NOTE — TELEPHONE ENCOUNTER
Patient sent 5 letters by mail all stating she does not want our staff contacting her emergency contacts unless she is in the hospital or dying. She had called back on 6/15 due to  reaching out to emergency contact to have her call and r/s appointment due to doctor out of office day of scheduled appointment. Patient made comments on phone stating  was  \"dumb. \" She stated her brother will no longer talk to her and can no longer be her emergency contact. I had explained that when  is unable to reach patient after several tries and/or leave message, they usually call emergency contact to reach out to patient. Information given to .

## 2021-06-25 DIAGNOSIS — I10 ESSENTIAL HYPERTENSION: ICD-10-CM

## 2021-06-28 RX ORDER — CLONIDINE 0.3 MG/24H
PATCH, EXTENDED RELEASE TRANSDERMAL
Qty: 4 PATCH | Refills: 0 | Status: SHIPPED | OUTPATIENT
Start: 2021-06-28 | End: 2021-07-26

## 2021-07-26 DIAGNOSIS — I10 ESSENTIAL HYPERTENSION: ICD-10-CM

## 2021-07-26 RX ORDER — CLONIDINE 0.3 MG/24H
PATCH, EXTENDED RELEASE TRANSDERMAL
Qty: 4 PATCH | Refills: 0 | Status: SHIPPED | OUTPATIENT
Start: 2021-07-26 | End: 2021-08-24 | Stop reason: SDUPTHER

## 2021-07-30 ENCOUNTER — TRANSCRIBE ORDER (OUTPATIENT)
Dept: SCHEDULING | Age: 71
End: 2021-07-30

## 2021-07-30 DIAGNOSIS — Z12.31 ENCOUNTER FOR MAMMOGRAM TO ESTABLISH BASELINE MAMMOGRAM: Primary | ICD-10-CM

## 2021-08-04 ENCOUNTER — VIRTUAL VISIT (OUTPATIENT)
Dept: CARDIOLOGY CLINIC | Age: 71
End: 2021-08-04
Payer: MEDICARE

## 2021-08-04 DIAGNOSIS — F41.9 ANXIETY: ICD-10-CM

## 2021-08-04 DIAGNOSIS — G47.33 OSA (OBSTRUCTIVE SLEEP APNEA): ICD-10-CM

## 2021-08-04 DIAGNOSIS — E66.01 SEVERE OBESITY (BMI 35.0-39.9) WITH COMORBIDITY (HCC): ICD-10-CM

## 2021-08-04 DIAGNOSIS — E78.2 MIXED HYPERLIPIDEMIA: ICD-10-CM

## 2021-08-04 DIAGNOSIS — I10 ESSENTIAL HYPERTENSION: ICD-10-CM

## 2021-08-04 DIAGNOSIS — I48.20 CHRONIC ATRIAL FIBRILLATION (HCC): Primary | ICD-10-CM

## 2021-08-04 PROCEDURE — 99442 PR PHYS/QHP TELEPHONE EVALUATION 11-20 MIN: CPT | Performed by: SPECIALIST

## 2021-08-04 RX ORDER — LANOLIN ALCOHOL/MO/W.PET/CERES
1000 CREAM (GRAM) TOPICAL AS NEEDED
COMMUNITY

## 2021-08-04 RX ORDER — PYRIDOXINE HCL (VITAMIN B6) 100 MG
100 TABLET ORAL AS NEEDED
COMMUNITY
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
## 2021-08-05 ENCOUNTER — TELEPHONE (OUTPATIENT)
Dept: CARDIOLOGY CLINIC | Age: 71
End: 2021-08-05

## 2021-08-10 DIAGNOSIS — E78.2 MIXED HYPERLIPIDEMIA: Primary | ICD-10-CM

## 2021-08-10 RX ORDER — LOVASTATIN 20 MG/1
TABLET ORAL
Qty: 90 TABLET | Refills: 1 | Status: SHIPPED | OUTPATIENT
Start: 2021-08-10 | End: 2022-01-04

## 2021-08-10 NOTE — TELEPHONE ENCOUNTER
Requested Prescriptions     Signed Prescriptions Disp Refills    lovastatin (MEVACOR) 20 mg tablet 90 Tablet 1     Sig: TAKE ONE TABLET BY MOUTH DAILY (GENERIC FOR MEVACOR)     Authorizing Provider: Ksenia Smith     Ordering User: Carolann Gagnon    Per Dr. Mikaela Garrett verbal orders

## 2021-08-18 ENCOUNTER — DOCUMENTATION ONLY (OUTPATIENT)
Dept: SLEEP MEDICINE | Age: 71
End: 2021-08-18

## 2021-08-24 DIAGNOSIS — I10 ESSENTIAL HYPERTENSION: ICD-10-CM

## 2021-08-24 RX ORDER — CLONIDINE 0.3 MG/24H
PATCH, EXTENDED RELEASE TRANSDERMAL
Qty: 4 PATCH | Refills: 0 | Status: SHIPPED | OUTPATIENT
Start: 2021-08-24 | End: 2021-10-11

## 2021-10-11 DIAGNOSIS — I10 ESSENTIAL HYPERTENSION: ICD-10-CM

## 2021-10-11 RX ORDER — CLONIDINE 0.3 MG/24H
PATCH, EXTENDED RELEASE TRANSDERMAL
Qty: 4 PATCH | Refills: 0 | Status: SHIPPED | OUTPATIENT
Start: 2021-10-11 | End: 2021-12-13 | Stop reason: SDUPTHER

## 2021-10-22 ENCOUNTER — VIRTUAL VISIT (OUTPATIENT)
Dept: CARDIOLOGY CLINIC | Age: 71
End: 2021-10-22
Payer: MEDICARE

## 2021-10-22 ENCOUNTER — TELEPHONE (OUTPATIENT)
Dept: CARDIOLOGY CLINIC | Age: 71
End: 2021-10-22

## 2021-10-22 DIAGNOSIS — I48.20 CHRONIC ATRIAL FIBRILLATION (HCC): Primary | ICD-10-CM

## 2021-10-22 PROCEDURE — 99442 PR PHYS/QHP TELEPHONE EVALUATION 11-20 MIN: CPT | Performed by: SPECIALIST

## 2021-10-22 NOTE — TELEPHONE ENCOUNTER
10/22/2021 at 4:22 attempted to reach patient to schedule 4 month telephone visit per Dr. Jayda Brooks - no answer - no voicemail - I'll try to reach patient again on Monday, 10/25/2021

## 2021-10-22 NOTE — TELEPHONE ENCOUNTER
----- Message from Christine Cleary RN sent at 10/22/2021  4:19 PM EDT -----  Regarding: appt    ----- Message -----  From: Daryle Flow, MD  Sent: 10/22/2021   4:15 PM EDT  To: Christine Cleary RN    Phone visit 4 months.  Needs no meds

## 2021-10-22 NOTE — PROGRESS NOTES
Mike Grant is a 65 y.o. female evaluated via telephone on 10/22/2021. Consent:  She and/or health care decision maker is aware that that she may receive a bill for this telephone service, depending on her insurance coverage, and has provided verbal consent to proceed: Yes    5/14 echo normal lvef, lae  5/14 lexiscan cardiolyte stress neg, lvef 70%  3/20 event monitor, 3 pauses max 3.3 secs, resolved after stopping verapamil, otherwise afib/flutter with controlled rate     Current Outpatient Medications on File Prior to Visit   Medication Sig    acetaminophen (TYLENOL PO) Take  by mouth two (2) times a day. Extra strength 2 before bedtime    cloNIDine (Catapres-TTS-3) 0.3 mg/24 hr APPLY 1 PATCH TOPICALLY TO SKIN EVERY 8 DAYS AS DIRECTED. **OVERDUE FOR APPOINTMENT**    lovastatin (MEVACOR) 20 mg tablet TAKE ONE TABLET BY MOUTH DAILY (GENERIC FOR MEVACOR)    pyridoxine, vitamin B6, (Vitamin B-6) 100 mg tablet Take 100 mg by mouth as needed.  cyanocobalamin 1,000 mcg tablet Take 1,000 mcg by mouth as needed.  benazepriL (LOTENSIN) 10 mg tablet TAKE ONE TABLET BY MOUTH TWICE A DAY    rivaroxaban (Xarelto) 20 mg tab tablet TAKE ONE TABLET BY MOUTH DAILY    CLINPRO 5000 1.1 % pste     brinzolamide-brimonidine (SIMBRINZA) 1-0.2 % drps Apply  to eye two (2) times a day.  ascorbic acid (VITAMIN C) 500 mg tablet Take 1,000 mg by mouth nightly.  Cholecalciferol, Vitamin D3, 3,000 unit tab Take 3,000 Units by mouth daily.  CALCIUM CARBONATE (TUMS PO) Take  by mouth as needed.  [DISCONTINUED] cloNIDine (Catapres-TTS-3) 0.3 mg/24 hr APPLY 1 PATCH TOPICALLY TO SKIN EVERY 8 DAYS AS DIRECTED. No current facility-administered medications on file prior to visit. Documentation:  Pt was contacted regarding appointment rescheduling and they requested a telephone appointment. Details of this discussion including any medical advice provided:   Bout the same with her.   She stays on the clonidine and then she takes the benazepril as needed if her systolic gets up to high over 150 or 160 and she has to do this several times per week typically. She continues to complain of neuropathy type symptoms but she has not yet made an appointment to see a neurologist because of her transportation issues. She does have a visiting doctor that comes to see her from time to time and draws blood and is confirmed that she is prediabetic. She has been told that chromium supplements can help delay the onset of diabetes. I was not aware of this and she asked my opinion and I told her is only she did not take more than what was recommended or stay on the low side of things that should be okay    She is stable and asymptomatic with respect to her A. fib on a reasonable medical regimen and needs no cardiac testing at this time. negative for chest pain, dyspnea, syncope, orthopnea, paroxysmal nocturnal dyspnea, exertional chest pressure/discomfort, claudication, lower extremity edema    current treatment plan is effective, no change in therapy  lab results and schedule of future lab studies reviewed with patient  reviewed diet, exercise and weight control    I affirm this is a Patient Initiated Episode with an Established Patient who has not had a related appointment within my department in the past 7 days or scheduled within the next 24 hours.     Total Time: minutes: 11-20 minutes    Note: not billable if this call serves to triage the patient into an appointment for the relevant concern      Washington Rushing MD

## 2021-10-25 NOTE — TELEPHONE ENCOUNTER
10/25/2021 patient returned my call telephone visit scheduled as follows: Future Appointments   Date Time Provider Kendrick Eusebia   11/11/2021  3:10 PM Ana Angeles NP [facility] BS AMB   11/11/2021  3:30 PM [facility]    2/25/2022  4:00 PM MD EMILI Back BS AMB

## 2021-10-25 NOTE — TELEPHONE ENCOUNTER
10/25/2021 at 2:18 attempted second call to patient no answer, no voicemail will attempt one additional call to patient to schedule telephone visit per Dr. Echo Corea

## 2021-11-11 ENCOUNTER — DOCUMENTATION ONLY (OUTPATIENT)
Dept: SLEEP MEDICINE | Age: 71
End: 2021-11-11

## 2021-11-11 ENCOUNTER — TELEPHONE (OUTPATIENT)
Dept: SLEEP MEDICINE | Age: 71
End: 2021-11-11

## 2021-11-11 ENCOUNTER — VIRTUAL VISIT (OUTPATIENT)
Dept: SLEEP MEDICINE | Age: 71
End: 2021-11-11
Payer: MEDICARE

## 2021-11-11 VITALS — BODY MASS INDEX: 36.44 KG/M2 | HEIGHT: 62 IN | WEIGHT: 198 LBS

## 2021-11-11 DIAGNOSIS — I10 PRIMARY HYPERTENSION: ICD-10-CM

## 2021-11-11 DIAGNOSIS — G47.33 OBSTRUCTIVE SLEEP APNEA (ADULT) (PEDIATRIC): Primary | ICD-10-CM

## 2021-11-11 DIAGNOSIS — M41.25 OTHER IDIOPATHIC SCOLIOSIS, THORACOLUMBAR REGION: ICD-10-CM

## 2021-11-11 PROCEDURE — 99442 PR PHYS/QHP TELEPHONE EVALUATION 11-20 MIN: CPT | Performed by: NURSE PRACTITIONER

## 2021-11-11 NOTE — PATIENT INSTRUCTIONS
[address] [place]. [place], [address]  Tel.  [phone]  Fax. [address] [facility] [address]  [place], [address] [facility] [address]  Tel.  [phone]  Fax. [phone] [address]  Tel.  [phone]  Fax. [phone]     Learning About CPAP for Sleep Apnea  What is CPAP? CPAP is a small machine that you use at home every night while you sleep. It increases air pressure in your throat to keep your airway open. When you have sleep apnea, this can help you sleep better so you feel much better. CPAP stands for \"continuous positive airway pressure. \"  The CPAP machine will have one of the following:  · A mask that covers your nose and mouth  · Prongs that fit into your nose  · A mask that covers your nose only, the most common type. This type is called NCPAP. The N stands for \"nasal.\"  Why is it done? CPAP is usually the best treatment for obstructive sleep apnea. It is the first treatment choice and the most widely used. Your doctor may suggest CPAP if you have:  · Moderate to severe sleep apnea. · Sleep apnea and coronary artery disease (CAD) or heart failure. How does it help? · CPAP can help you have more normal sleep, so you feel less sleepy and more alert during the daytime. · CPAP may help keep heart failure or other heart problems from getting worse. · NCPAP may help lower your blood pressure. · If you use CPAP, your bed partner may also sleep better because you are not snoring or restless. What are the side effects? Some people who use CPAP have:  · A dry or stuffy nose and a sore throat. · Irritated skin on the face. · Sore eyes. · Bloating. If you have any of these problems, work with your doctor to fix them. Here are some things you can try:  · Be sure the mask or nasal prongs fit well. · See if your doctor can adjust the pressure of your CPAP. · If your nose is dry, try a humidifier.   · If your nose is runny or stuffy, try decongestant medicine or a steroid nasal spray. If these things do not help, you might try a different type of machine. Some machines have air pressure that adjusts on its own. Others have air pressures that are different when you breathe in than when you breathe out. This may reduce discomfort caused by too much pressure in your nose. Where can you learn more? Go to [employer]  Enter Michael Granados in the search box to learn more about \"Learning About CPAP for Sleep Apnea. \"   © 0544-0985 Healthwise, Incorporated. Care instructions adapted under license by [facility] [employer] (which disclaims liability or warranty for this information). This care instruction is for use with your licensed healthcare professional. If you have questions about a medical condition or this instruction, always ask your healthcare professional. [address] any warranty or liability for your use of this information. Content Version: 5.2.21049; Last Revised: January 11, 2010  PROPER SLEEP HYGIENE    What to avoid  · Do not have drinks with caffeine, such as coffee or black tea, for 8 hours before bed. · Do not smoke or use other types of tobacco near bedtime. Nicotine is a stimulant and can keep you awake. · Avoid drinking alcohol late in the evening, because it can cause you to wake in the middle of the night. · Do not eat a big meal close to bedtime. If you are hungry, eat a light snack. · Do not drink a lot of water close to bedtime, because the need to urinate may wake you up during the night. · Do not read or watch TV in bed. Use the bed only for sleeping and sexual activity. What to try  · Go to bed at the same time every night, and wake up at the same time every morning. Do not take naps during the day. · Keep your bedroom quiet, dark, and cool. · Get regular exercise, but not within 3 to 4 hours of your bedtime. .  · Sleep on a comfortable pillow and mattress.   · If watching the clock makes you anxious, turn it facing away from you so you cannot see the time. · If you worry when you lie down, start a worry book. Well before bedtime, write down your worries, and then set the book and your concerns aside. · Try meditation or other relaxation techniques before you go to bed. · If you cannot fall asleep, get up and go to another room until you feel sleepy. Do something relaxing. Repeat your bedtime routine before you go to bed again. · Make your house quiet and calm about an hour before bedtime. Turn down the lights, turn off the TV, log off the computer, and turn down the volume on music. This can help you relax after a busy day. Drowsy Driving: The [facility] 54 cites drowsiness as a causing factor in more than 149,875 police reported crashes annually, resulting in 76,000 injuries and 1,500 deaths. Other surveys suggest 55% of people polled have driven while drowsy in the past year, 23% had fallen asleep but not crashed, 3% crashed, and 2% had and accident due to drowsy driving. Who is at risk? Young Drivers: One study of drowsy driving accidents states that 55% of the drivers were under 25 years. Of those, 75% were male. Shift Workers and Travelers: People who work overnight or travel across time zones frequently are at higher risk of experiencing Circadian Rhythm Disorders. They are trying to work and function when their body is programed to sleep. Sleep Deprived: Lack of sleep has a serious impact on your ability to pay attention or focus on a task. Consistently getting less than the average of 8 hours your body needs creates partial or cumulative sleep deprivation. Untreated Sleep Disorders: Sleep Apnea, Narcolepsy, R.L.S., and other sleep disorders (untreated) prevent a person from getting enough restful sleep. This leads to excessive daytime sleepiness and increases the risk for drowsy driving accidents by up to 7 times.   Medications / Alcohol: Even over the counter medications can cause drowsiness. Medications that impair a drivers attention should have a warning label. Alcohol naturally makes you sleepy and on its own can cause accidents. Combined with excessive drowsiness its effects are amplified. Signs of Drowsy Driving:   * You don't remember driving the last few miles   * You may drift out of your wilma   * You are unable to focus and your thoughts wander   * You may yawn more often than normal   * You have difficulty keeping your eyes open / nodding off   * Missing traffic signs, speeding, or tailgating  Prevention-   Good sleep hygiene, lifestyle and behavioral choices have the most impact on drowsy driving. There is no substitute for sleep and the average person requires 8 hours nightly. If you find yourself driving drowsy, stop and sleep. Consider the sleep hygiene tips provided during your visit as well. Medication Refill Policy: Refills for all medications require 1 week advance notice. Please have your pharmacy fax a refill request. We are unable to fax, or call in \"controled substance\" medications and you will need to pick these prescriptions up from our office. [employer] Activation    Thank you for requesting access to [employer]. Please follow the instructions below to securely access and download your online medical record. [employer] allows you to send messages to your doctor, view your test results, renew your prescriptions, schedule appointments, and more. How Do I Sign Up? 1. In your internet browser, go to [email or web address]. 2. Click on the First Time User? Click Here link in the Sign In box. You will see the New Member Sign Up page. 3. Enter your [employer] Access Code exactly as it appears below. You will not need to use this code after youve completed the sign-up process. If you do not sign up before the expiration date, you must request a new code. [employer] Access Code:  Activation code not generated  Current [employer] Status: Active (This is the date your [employer] access code will )    4. Enter the last four digits of your Social Security Number (xxxx) and Date of Birth (mm/dd/yyyy) as indicated and click Submit. You will be taken to the next sign-up page. 5. Create a [employer] ID. This will be your [employer] login ID and cannot be changed, so think of one that is secure and easy to remember. 6. Create a [employer] password. You can change your password at any time. 7. Enter your Password Reset Question and Answer. This can be used at a later time if you forget your password. 8. Enter your e-mail address. You will receive e-mail notification when new information is available in [address]. 9. Click Sign Up. You can now view and download portions of your medical record. 10. Click the Download Summary menu link to download a portable copy of your medical information. Additional Information    If you have questions, please call [phone]. Remember, [employer] is NOT to be used for urgent needs. For medical emergencies, dial 911.

## 2021-11-11 NOTE — PROGRESS NOTES
[address] [facility] [place]. [place], [address]   Tel.  [phone]   Fax. [address] [facility]   [place], [address] [facility]   Tel.  [phone]   Fax. [phone] [address]   Tel.  [phone]   Fax. Priti Callahan (: 1950) is a 65 y.o. female, established patient, seen for positive airway pressure follow-up, she was last seen by me on 2020, previously seen by Dr. Cinthya Ventura on 2019, prior notes reviewed in detail. In lab split sleep test 3/2014 showed AHI of 11.5/hr with a lowest SaO2 of 86%. ASSESSMENT/PLAN:    ICD-10-CM ICD-9-CM    1. Obstructive sleep apnea (adult) (pediatric)  G47.33 327.23 AMB SUPPLY ORDER   2. Primary hypertension  I10 401.9    3. Other idiopathic scoliosis, thoracolumbar region  M41.25 737.30    4. Adult BMI 36.0-36.9 kg/sq m  Z68.36 V85.36        AHI = 11.5(3/2014). On ResMed APAP :  10-11 cmH2O. Set up 2020. She is adherent with PAP therapy and PAP continues to benefit patient and remains necessary for control of her sleep apnea. Follow-up and Dispositions    · Return in about 1 year (around 2022) for annual follow up . 1. Sleep Apnea - Continue on current pressures. Suggested that she try going to bed later or getting up earlier to help with improved sleep which she states she cannot do. Suggestions were also made to adjust sleep position and she declines these. *  Supplies ordered - full face mask and heated tubing    Orders Placed This Encounter    AMB SUPPLY ORDER     Diagnosis: (G47.33) BERNIE (obstructive sleep apnea)  (primary encounter diagnosis)     Replacement Supplies for Positive Airway Pressure Therapy Device:   Duration of need: 99 months.  Full Face Mask 1 every 3 months.  Full Face Mask Cushion 1 per month.  Headgear 1 every 6 months.    Pos Airway pressure chin strap     Tubing with heating element 1 every 3 months.  Filter(s) Disposable 2 per month.  Filter(s) Non-Disposable 1 every 6 months. .   [address] [facility] for Humidifier (Replace) 1 every 6 months. CATHERINE Finn-BC; NPI: [id number]    Electronically signed. Date:- 11/11/21     * Re-enforced proper and regular cleaning for the device. We discussed the risk associated with use of cleaning devices and she will continue with use of dish soap and water as the appropriate cleaning method. * She was asked to contact our office for any problems regarding PAP therapy. 2. Hypertension -  continue on her current regimen, she will continue to monitor her BP and follow up with her PMD for reevaluation/adjustment of medications if warranted. I have reviewed the relationship between hypertension as it relates to sleep-disordered breathing. 3. Scoliosis - she notes that she is most comfortable when she sleeps flat on her back, she cannot use a pillow. PAP device does help with breathing in this position. 4.  Encouraged continued weight management program through appropriate diet and exercise regimen as significant weight reduction has been shown to reduce severity of obstructive sleep apnea. She notes she is wheelchair bound but that she has lost 10 pounds with change in diet, eating more vegetables. SUBJECTIVE/OBJECTIVE:    She  is seen today for follow up on PAP device and reports no problems using the device. The following concerns reviewed:    Drowsiness no Problems exhaling no   Snoring no Forget to put on no   Mask Comfortable yes Can't fall asleep no   Dry Mouth no Mask falls off no   Air Leaking no Frequent awakenings no       She feels that her sleep has not improved on PAP therapy using full face mask and heated tubing, she notes she wakes up frequently and feels that her head is throbbing during the night.   She does sleep during the day which she was advised to avoid and stated she had to sleep during the day because she does not sleep well at night. She is concerned about neuropathy and pre-diabetes. Sleep apnea is well controlled with PAP device settings and she uses daily. Elevated leak discussed, she will replace mask cushion more frequently. Review of device download indicated:  Auto pressure: 10-11 cmH2O; Max Pressure: 10.9 cmH2O;  95th percentile Pressure: 10.9 cmH2O   95th Percentile Leak: 81.1 L/Min     % Used Days >= 4 hours: 100. Avg hours used:  9:04. Therapy Apnea Index averaged over PAP use: 2.7 /hr which reflects improved sleep breathing condition. [place] Sleepiness Score: 7 and Modified F.O.S.Q. Score Total / 2: 15.5 which reflects improved sleep quality over therapy time. Sleep Review of Systems: notable for Negative difficulty falling asleep; Positive awakenings at night; Negative early morning headaches; Negative memory problems; Negative concentration issues; Negative chest pain; Negative shortness of breath; Negative significant joint pain at night; Negative significant muscle pain at night; Negative rashes or itching; Negative heartburn; Negative significant mood issues; daily afternoon naps    Vitals reported by patient   Patient-Reported Vitals 11/11/2021   Patient-Reported Weight 198   Patient-Reported Pulse -   Patient-Reported Systolic  -   Patient-Reported Diastolic -      Calculated BMI 36    Physical Exam not completed due to audio only visit. Pursuant to the emergency declaration under the [facility], [address] [place] [address] authority and the [employer] and [employer] Act, this telephone encounter was conducted with patient's (and/or legal guardian's) consent, to reduce the risk of exposure to COVID-19 and provide necessary medical care. Services were provided through a telephone call to substitute for in-person encounter.  I was in the office while conducting this encounter, patient located at their home or alternate location of their choice. Patient identification was verified at the start of the visit: YES using name and date of birth. Patient's phone number [phone] (home)  was confirmed for accuracy. She gives permission for messages regarding results and appointments to be left at that number. On this date 11/11/21 I have spent 20 minutes reviewing previous notes, test results, and on an audio only visit with the patient discussing the diagnosis and importance of compliance with the treatment plan as well as documenting on the day of the visit. An electronic signature was used to authenticate this note.     -- Ponce Benton NP, [facility]  11/11/21

## 2021-11-19 ENCOUNTER — TELEPHONE (OUTPATIENT)
Dept: CARDIOLOGY CLINIC | Age: 71
End: 2021-11-19

## 2021-11-19 NOTE — TELEPHONE ENCOUNTER
Per Dr. Beulah Bates, pt can take an extra benazepril. Called pt. Pt thinks stress related to going to doctor's appt was causing elevated BP. Her bp today was 130/80, but her sister in-law advised she contact Dr. Beulah Bates about yesterday's elevated bp. She will monitor BP and take extra benazepril if needed. She is losing weight by eating regular meals and eating only vegetable in between for snacks. Discussed signs and symptoms qualifying urgent care or call to office. Patient verbalized understanding and denied further questions or concerns.

## 2021-11-19 NOTE — TELEPHONE ENCOUNTER
Patient called to inform that her blood pressure is 179/95. Stated that per google, she needs to contact Dr. Ami Bailey or the nurse.        Phone: [phone]

## 2021-11-30 DIAGNOSIS — I10 ESSENTIAL HYPERTENSION: ICD-10-CM

## 2021-11-30 DIAGNOSIS — I48.20 CHRONIC ATRIAL FIBRILLATION (HCC): ICD-10-CM

## 2021-11-30 NOTE — TELEPHONE ENCOUNTER
Requested Prescriptions     Signed Prescriptions Disp Refills    rivaroxaban (Xarelto) 20 mg tab tablet 30 Tablet 5     Sig: TAKE ONE TABLET BY MOUTH DAILY     Authorizing Provider: Robert Maxwell     Ordering User: Guanaco Abraham Per Dr. France Jenkins verbal orders

## 2021-11-30 NOTE — TELEPHONE ENCOUNTER
Patient is calling because she needs a refill on her Xarelto 20 mg. Patient is low on her medicine. Pharmacy confirmed.     [phone]

## 2021-12-13 ENCOUNTER — TELEPHONE (OUTPATIENT)
Dept: CARDIOLOGY CLINIC | Age: 71
End: 2021-12-13

## 2021-12-13 DIAGNOSIS — I48.20 CHRONIC ATRIAL FIBRILLATION (HCC): ICD-10-CM

## 2021-12-13 DIAGNOSIS — I10 ESSENTIAL HYPERTENSION: ICD-10-CM

## 2021-12-13 RX ORDER — CLONIDINE 0.3 MG/24H
PATCH, EXTENDED RELEASE TRANSDERMAL
Qty: 12 PATCH | Refills: 0 | Status: SHIPPED | OUTPATIENT
Start: 2021-12-13 | End: 2022-05-18 | Stop reason: SDUPTHER

## 2021-12-13 NOTE — TELEPHONE ENCOUNTER
Patient called to speak with the nurse about her blood pressure being elevated and also would like to discuss some other concerns, please advise        [phone]

## 2021-12-13 NOTE — TELEPHONE ENCOUNTER
Called pt. Verified patient's identity with two identifiers. She said her BP was elevated 181/95 and 160/90 before getting ready to go to the eye doctor so she did not go. She took lotensin and it went down to 123/72. She still is not getting good sleep. She wanted me to let her know the home doctor that came to her house no longer is working, she is not seeing Dr. Netta Ramon, so has no pcp. I told her we would ask around for any pcps that make home visits and let her know. Patient yeni requested 3 mo supply of catapress patches and xarelto. Verified patient's pharmacy. Prescriptions sent. Patient verbalized understanding and denied further questions or concerns.     Requested Prescriptions     Signed Prescriptions Disp Refills    cloNIDine (Catapres-TTS-3) 0.3 mg/24 hr 12 Patch 0     Sig: APPLY 1 PATCH TOPICALLY TO SKIN EVERY 8 DAYS AS DIRECTED. **OVERDUE FOR APPOINTMENT**     Authorizing Provider: Brooke Blancas     Ordering User: Preet Duarte rivaroxaban (Xarelto) 20 mg tab tablet 90 Tablet 0     Sig: TAKE ONE TABLET BY MOUTH DAILY     Authorizing Provider: Brooke Blancas     Ordering User: Narciso Navarro    Per Dr. Kirsten Welch verbal orders

## 2021-12-15 ENCOUNTER — TELEPHONE (OUTPATIENT)
Dept: CARDIOLOGY CLINIC | Age: 71
End: 2021-12-15

## 2021-12-15 NOTE — TELEPHONE ENCOUNTER
Patient called requesting to speak with you.  Stated she has additional questions following the phone call earlier this week.       -did not disclose questions       Phone: [phone]

## 2021-12-23 NOTE — TELEPHONE ENCOUNTER
Identifiers x 2. Inquired to phone call. She did not want to discuss. States \"there is nothing that can be done until Monday\". Inquired to message that I could relay to MD or Prerna Owusu. Did not want to discuss.

## 2022-01-04 DIAGNOSIS — E78.2 MIXED HYPERLIPIDEMIA: ICD-10-CM

## 2022-01-04 RX ORDER — LOVASTATIN 20 MG/1
TABLET ORAL
Qty: 90 TABLET | Refills: 1 | Status: SHIPPED | OUTPATIENT
Start: 2022-01-04 | End: 2022-08-01

## 2022-02-09 ENCOUNTER — HOSPITAL ENCOUNTER (OUTPATIENT)
Dept: MAMMOGRAPHY | Age: 72
Discharge: HOME | End: 2022-02-09
Attending: FAMILY MEDICINE
Payer: MEDICARE

## 2022-02-09 DIAGNOSIS — Z12.31 ENCOUNTER FOR MAMMOGRAM TO ESTABLISH BASELINE MAMMOGRAM: ICD-10-CM

## 2022-02-09 PROCEDURE — 77063 BREAST TOMOSYNTHESIS BI: CPT

## 2022-02-28 ENCOUNTER — VIRTUAL VISIT (OUTPATIENT)
Dept: CARDIOLOGY CLINIC | Age: 72
End: 2022-02-28
Payer: MEDICARE

## 2022-02-28 ENCOUNTER — TELEPHONE (OUTPATIENT)
Dept: CARDIOLOGY CLINIC | Age: 72
End: 2022-02-28

## 2022-02-28 DIAGNOSIS — I10 ESSENTIAL HYPERTENSION: Primary | ICD-10-CM

## 2022-02-28 DIAGNOSIS — G47.33 OSA (OBSTRUCTIVE SLEEP APNEA): Primary | ICD-10-CM

## 2022-02-28 DIAGNOSIS — E66.01 SEVERE OBESITY (BMI 35.0-39.9) WITH COMORBIDITY (HCC): ICD-10-CM

## 2022-02-28 DIAGNOSIS — I48.20 CHRONIC ATRIAL FIBRILLATION (HCC): ICD-10-CM

## 2022-02-28 DIAGNOSIS — F41.9 ANXIETY: ICD-10-CM

## 2022-02-28 PROCEDURE — 99443 PR PHYS/QHP TELEPHONE EVALUATION 21-30 MIN: CPT | Performed by: SPECIALIST

## 2022-02-28 RX ORDER — AMLODIPINE BESYLATE 5 MG/1
5 TABLET ORAL
Qty: 90 TABLET | Refills: 1 | Status: SHIPPED | OUTPATIENT
Start: 2022-02-28

## 2022-02-28 NOTE — TELEPHONE ENCOUNTER
----- Message from Hal Zamudio MD sent at 2/28/2022  4:10 PM EST -----  Start amlodipine 5 mg per day as needed for bp. 6mo phone visit.  Needs no other refills

## 2022-02-28 NOTE — TELEPHONE ENCOUNTER
Called pt. No answer. Went ahead and sent rx for amlodipine 5 mg daily prn to [address] [place] [address]. Scheduled 6 month telephone f/u and mailed appointment to patient stating she can call the office if date/time needs to be r/s'ed.

## 2022-02-28 NOTE — PROGRESS NOTES
Ellie Abel is a 66 y.o. female evaluated via telephone on 2/28/2022. Consent:  She and/or health care decision maker is aware that that she may receive a bill for this telephone service, depending on her insurance coverage, and has provided verbal consent to proceed: Yes    5/14 echo normal lvef, lae  5/14 lexiscan cardiolyte stress neg, lvef 70%  3/20 event monitor, 3 pauses max 3.3 secs, resolved after stopping verapamil, otherwise afib/flutter with controlled rate     Current Outpatient Medications on File Prior to Visit   Medication Sig    lovastatin (MEVACOR) 20 mg tablet TAKE ONE TABLET BY MOUTH DAILY.  cloNIDine (Catapres-TTS-3) 0.3 mg/24 hr APPLY 1 PATCH TOPICALLY TO SKIN EVERY 8 DAYS AS DIRECTED. **OVERDUE FOR APPOINTMENT**    rivaroxaban (Xarelto) 20 mg tab tablet TAKE ONE TABLET BY MOUTH DAILY    acetaminophen (TYLENOL PO) Take  by mouth two (2) times a day. Extra strength 2 before bedtime    pyridoxine, vitamin B6, (Vitamin B-6) 100 mg tablet Take 100 mg by mouth as needed.  cyanocobalamin 1,000 mcg tablet Take 1,000 mcg by mouth as needed.  benazepriL (LOTENSIN) 10 mg tablet TAKE ONE TABLET BY MOUTH TWICE A DAY    CLINPRO 5000 1.1 % pste     brinzolamide-brimonidine (SIMBRINZA) 1-0.2 % drps Apply  to eye two (2) times a day.  ascorbic acid (VITAMIN C) 500 mg tablet Take 1,000 mg by mouth nightly.  Cholecalciferol, Vitamin D3, 3,000 unit tab Take 3,000 Units by mouth daily.  CALCIUM CARBONATE (TUMS PO) Take  by mouth as needed. No current facility-administered medications on file prior to visit. Documentation:  Pt was contacted regarding appointment rescheduling and they requested a telephone appointment. Details of this discussion including any medical advice provided: For the most part her blood pressures been doing pretty well taking just 1 of benazepril in the evening.   She has had a couple of appointments with the vascular surgeons because she is think she may have a vascular problem but each time she is supposed to have gone for a visit her blood pressures been too high and she has had to cancel. At 1 point she actually took 3 extra benazepril her apartment building it did not seem to help. She is having trouble sleeping because of some sort of disturbance at night in her apartment building. She says she lost about 15 pounds has no edema. She does not want me to know any details about her vascular concerns because he is afraid that people and her building will find out the details of her medical history. We are going to try amlodipine 5 mg as needed in the morning if her blood pressure goes too high. She can continue to supplement with the benazepril as she has been doing. I do not want to put her back on verapamil or beta-blocker because she has had documented bradycardia and pauses when she was taking verapamil. Her most recent PCP who was able to do home visits has quit her job.        negative for chest pain, dyspnea, palpitations    current treatment plan is effective, no change in therapy  lab results and schedule of future lab studies reviewed with patient  reviewed diet, exercise and weight control    I affirm this is a Patient Initiated Episode with an Established Patient who has not had a related appointment within my department in the past 7 days or scheduled within the next 24 hours.     Total Time: minutes: 21-30 minutes    Note: not billable if this call serves to triage the patient into an appointment for the relevant concern      Jorgito Heck MD

## 2022-03-18 PROBLEM — M41.25 OTHER IDIOPATHIC SCOLIOSIS, THORACOLUMBAR REGION: Status: ACTIVE | Noted: 2018-04-26

## 2022-03-19 PROBLEM — E66.01 SEVERE OBESITY (BMI 35.0-39.9) WITH COMORBIDITY (HCC): Status: ACTIVE | Noted: 2018-04-26

## 2022-03-19 PROBLEM — F41.9 ANXIETY: Status: ACTIVE | Noted: 2017-03-30

## 2022-03-24 ENCOUNTER — TELEPHONE (OUTPATIENT)
Dept: CARDIOLOGY CLINIC | Age: 72
End: 2022-03-24

## 2022-03-24 NOTE — TELEPHONE ENCOUNTER
Pt calling to say you can call her back tomorrow because it's too late today.      Phone: [phone]

## 2022-03-25 ENCOUNTER — PATIENT OUTREACH (OUTPATIENT)
Dept: CASE MANAGEMENT | Age: 72
End: 2022-03-25

## 2022-03-25 DIAGNOSIS — F41.9 ANXIETY: ICD-10-CM

## 2022-03-25 DIAGNOSIS — F32.89 OTHER DEPRESSION: Primary | ICD-10-CM

## 2022-03-25 NOTE — PROGRESS NOTES
Care Transitions Note:     Received in basket message-request from Dr. Lisa Pastor nurse to help secure home based primary care to ensure care and refills. Referral made to [address] [place] [address].    Copy to [facility] team.

## 2022-05-18 DIAGNOSIS — I10 ESSENTIAL HYPERTENSION: ICD-10-CM

## 2022-05-18 RX ORDER — CLONIDINE 0.3 MG/24H
PATCH, EXTENDED RELEASE TRANSDERMAL
Qty: 12 PATCH | Refills: 0 | Status: SHIPPED | OUTPATIENT
Start: 2022-05-18 | End: 2022-07-29 | Stop reason: SDUPTHER

## 2022-05-18 NOTE — TELEPHONE ENCOUNTER
Patient is calling because she needs a refill on clonidine (Catapres TTS-3) 0.3 mg/24 hr patches. Pharmacy is confirmed.     [phone]

## 2022-05-18 NOTE — TELEPHONE ENCOUNTER
Requested Prescriptions     Signed Prescriptions Disp Refills    cloNIDine (Catapres-TTS-3) 0.3 mg/24 hr 12 Patch 0     Sig: APPLY 1 PATCH TOPICALLY TO SKIN EVERY 8 DAYS AS DIRECTED. **OVERDUE FOR APPOINTMENT**     Authorizing Provider: Charlotte Garza     Ordering User: Roland Duque    Per Dr. Susanna Espinoza verbal orders

## 2022-06-02 DIAGNOSIS — I10 ESSENTIAL HYPERTENSION: Primary | ICD-10-CM

## 2022-06-03 RX ORDER — BENAZEPRIL HYDROCHLORIDE 10 MG/1
TABLET ORAL
Qty: 60 TABLET | Refills: 11 | Status: SHIPPED | OUTPATIENT
Start: 2022-06-03 | End: 2022-08-29 | Stop reason: SDUPTHER

## 2022-06-03 NOTE — TELEPHONE ENCOUNTER
Requested Prescriptions     Signed Prescriptions Disp Refills    benazepriL (LOTENSIN) 10 mg tablet 60 Tablet 11     Sig: TAKE ONE TABLET BY MOUTH TWICE A DAY     Authorizing Provider: Laurent Bear     Ordering User: Gregory Shi Hermann verbal orders

## 2022-07-29 ENCOUNTER — TELEPHONE (OUTPATIENT)
Dept: CARDIOLOGY CLINIC | Age: 72
End: 2022-07-29

## 2022-07-29 DIAGNOSIS — I10 ESSENTIAL HYPERTENSION: ICD-10-CM

## 2022-07-29 RX ORDER — CLONIDINE 0.3 MG/24H
PATCH, EXTENDED RELEASE TRANSDERMAL
Qty: 12 PATCH | Refills: 0 | Status: SHIPPED | OUTPATIENT
Start: 2022-07-29 | End: 2022-09-06

## 2022-07-29 NOTE — TELEPHONE ENCOUNTER
Calling to follow up on the pre-authorization on cloNIDine (Catapres-TTS-3) 0.3 mg/24 hr. Must be brand name patches.         Please advise        EIEWV:[phone]

## 2022-07-29 NOTE — TELEPHONE ENCOUNTER
Brand was denied 1st try. Spoke with [facility] Pharmacist. She said pt was getting brand catapres from [facility], but now switched to [facility] and the brand is a different  not covered by patient's plan. Rx need PA. She gave me patient's insurance card info. Opened new case using covermymeds. Key: [id number].     Rx id: [id number]   Roberto [place]: [id number]  Pcn: [id number]  Group: [id number]

## 2022-08-01 DIAGNOSIS — E78.2 MIXED HYPERLIPIDEMIA: ICD-10-CM

## 2022-08-01 RX ORDER — LOVASTATIN 20 MG/1
20 TABLET ORAL
Qty: 90 TABLET | Refills: 0 | Status: SHIPPED | OUTPATIENT
Start: 2022-08-01 | End: 2022-08-05 | Stop reason: SDUPTHER

## 2022-08-01 NOTE — TELEPHONE ENCOUNTER
Requested Prescriptions     Signed Prescriptions Disp Refills    lovastatin (MEVACOR) 20 mg tablet 90 Tablet 0     Sig: Take 1 Tablet by mouth nightly. TAKE ONE TABLET BY MOUTH DAILY. Authorizing Provider: Brisa Morrison     Ordering User: Rut Lagos   Per Dr. Chata Hogan verbal orders       Labs scanned in under media from 6/4/2021 (liver fx and lipid included).  Will discuss her getting labs during VV fu.     Future Appointments   Date Time Provider Kendrick Laws   9/1/2022  4:00 PM MD EMILI Ortega [facility]B

## 2022-08-02 NOTE — TELEPHONE ENCOUNTER
[facility] pharmacy is calling to find out the status of the patient's medication request.They are trying to find out if the authorization was received or approved.     [phone]

## 2022-08-05 ENCOUNTER — TELEPHONE (OUTPATIENT)
Dept: CARDIOLOGY CLINIC | Age: 72
End: 2022-08-05

## 2022-08-05 DIAGNOSIS — E78.2 MIXED HYPERLIPIDEMIA: ICD-10-CM

## 2022-08-05 RX ORDER — LOVASTATIN 20 MG/1
20 TABLET ORAL
Qty: 90 TABLET | Refills: 0 | Status: SHIPPED | OUTPATIENT
Start: 2022-08-05 | End: 2022-10-28

## 2022-08-05 NOTE — TELEPHONE ENCOUNTER
Dr. Fabby Membreno said no need for \"brand\" just because it was approved  previously. Patient has not tried and failed generic. I completed a new case anyway using covermymeds.  [id number] - PA Case ID: [id number] - Rx #: [id number]

## 2022-08-05 NOTE — TELEPHONE ENCOUNTER
Pt asking to speak to the nurse. Would also like to know if a sooner VV is possible.      [phone]

## 2022-08-05 NOTE — TELEPHONE ENCOUNTER
Requested Prescriptions     Signed Prescriptions Disp Refills    lovastatin (MEVACOR) 20 mg tablet 90 Tablet 0     Sig: Take 1 Tablet by mouth nightly. TAKE ONE TABLET BY MOUTH DAILY.      Authorizing Provider: Supriya Snyder     Ordering User: Abdiel Laird    Per Dr. Danyelle Walter verbal orders

## 2022-08-05 NOTE — TELEPHONE ENCOUNTER
Pt asking to speak to the nurse. Would also like to know if a sooner VV is possible.      [phone] Yes

## 2022-08-08 NOTE — TELEPHONE ENCOUNTER
Patient is calling back to speak with the nurse about her medicines. Patient said she needs a VV this week if possible.     [phone]

## 2022-08-08 NOTE — TELEPHONE ENCOUNTER
Patient states she really needs a return call stating she has other issues that need to be address, states her problem about her medication is more complicated.       KZQWO:[phone]

## 2022-08-09 NOTE — TELEPHONE ENCOUNTER
Called pt yesterday. No answer. Called patient today. Verified patient's identity with two identifiers. Pt stated she got a letter at the beginning of the year stating brand catapres approved through Dec 31 2022, but now being told  shut down. She said she used generic over 30 years ago, but it did not work so on brand since. The pills didn't work prior to patches. She is worried brand is not being made, but also read online that patients over 65 should not stay on clonidine forever, then listed problems that could occure. She also listed problems that can occur if med abruptly stopped. Patient has enough patches to last a couple week. Moved up VV with Dr. Cristina Tracy from 9/1 to 8/29 because pt requested and needs a 4:00 time. I said I would message Dr. Cristina Tracy and if he advises switxching her to something else other than clonidine patches, will let her know. Otherwise, she will  generic to try again until VV. Patient verbalized understanding and denied further questions or concerns.      Future Appointments   Date Time Provider Kendrick Laws   8/29/2022  4:00 PM MD EMILI Gar AMB

## 2022-08-29 ENCOUNTER — VIRTUAL VISIT (OUTPATIENT)
Dept: CARDIOLOGY CLINIC | Age: 72
End: 2022-08-29
Payer: MEDICARE

## 2022-08-29 DIAGNOSIS — G47.33 OSA (OBSTRUCTIVE SLEEP APNEA): ICD-10-CM

## 2022-08-29 DIAGNOSIS — E78.2 MIXED HYPERLIPIDEMIA: ICD-10-CM

## 2022-08-29 DIAGNOSIS — I10 ESSENTIAL HYPERTENSION: ICD-10-CM

## 2022-08-29 DIAGNOSIS — E66.01 SEVERE OBESITY (BMI 35.0-39.9) WITH COMORBIDITY (HCC): ICD-10-CM

## 2022-08-29 DIAGNOSIS — I48.20 CHRONIC ATRIAL FIBRILLATION (HCC): Primary | ICD-10-CM

## 2022-08-29 DIAGNOSIS — F41.9 ANXIETY: ICD-10-CM

## 2022-08-29 PROCEDURE — 99443 PR PHYS/QHP TELEPHONE EVALUATION 21-30 MIN: CPT | Performed by: SPECIALIST

## 2022-08-29 RX ORDER — BENAZEPRIL HYDROCHLORIDE 10 MG/1
10 TABLET ORAL
Qty: 90 TABLET | Refills: 5 | Status: SHIPPED | OUTPATIENT
Start: 2022-08-29 | End: 2022-09-14 | Stop reason: SDUPTHER

## 2022-08-29 NOTE — PROGRESS NOTES
Sondra Soares is a 66 y.o. female evaluated via telephone on 8/29/2022. Consent:  She and/or health care decision maker is aware that that she may receive a bill for this telephone service, depending on her insurance coverage, and has provided verbal consent to proceed: Yes    5/14 echo normal lvef, lae  5/14 lexiscan cardiolyte stress neg, lvef 70%  3/20 event monitor, 3 pauses max 3.3 secs, resolved after stopping verapamil, otherwise afib/flutter with controlled rate     Current Outpatient Medications on File Prior to Visit   Medication Sig    lovastatin (MEVACOR) 20 mg tablet Take 1 Tablet by mouth nightly. TAKE ONE TABLET BY MOUTH DAILY. cloNIDine (Catapres-TTS-3) 0.3 mg/24 hr APPLY 1 PATCH TOPICALLY TO SKIN EVERY 8 DAYS AS DIRECTED. **OVERDUE FOR APPOINTMENT**    benazepriL (LOTENSIN) 10 mg tablet TAKE ONE TABLET BY MOUTH TWICE A DAY (Patient taking differently: daily as needed. Taking 2-3 tabs daily)    rivaroxaban (Xarelto) 20 mg tab tablet TAKE ONE TABLET BY MOUTH DAILY    acetaminophen (TYLENOL PO) Take  by mouth nightly. Extra strength 1 tab before bedtime    pyridoxine, vitamin B6, (VITAMIN B-6) 100 mg tablet Take 100 mg by mouth as needed. cyanocobalamin 1,000 mcg tablet Take 1,000 mcg by mouth as needed. CLINPRO 5000 1.1 % pste     brinzolamide-brimonidine 1-0.2 % drps Apply  to eye two (2) times a day. ascorbic acid, vitamin C, (VITAMIN C) 500 mg tablet Take 1,000 mg by mouth nightly. Cholecalciferol, Vitamin D3, 3,000 unit tab Take 3,000 Units by mouth daily. CALCIUM CARBONATE (TUMS PO) Take  by mouth as needed. amLODIPine (NORVASC) 5 mg tablet Take 1 Tablet by mouth daily as needed (elevated BP). (Patient not taking: Reported on 8/29/2022)     No current facility-administered medications on file prior to visit. Documentation:  Pt was contacted regarding appointment rescheduling and they requested a telephone appointment.   Details of this discussion including any medical advice provided: Cardiac wise she is doing okay with no worrisome symptoms. She is frustrated because she cannot get name brand clonidine patches anymore. She had gotten on a diet of fresh vegetables for snacks and lost about 10 pounds and then she cracked a tooth and apparently went to the dentist and things have not been resolved. For the most part her blood pressure is doing pretty well occasionally she has to take 3 benazepril a day and sometimes she skips it because the systolics will be in the 679 range. She cannot afford transportation to get to the doctor and she is reached out to home program through both [address] [place] [address] [place] [address] but has not had resolution about whether that would be an option for her or not. She says her sister lives in [place] and her brother lives in [place] but she cannot move to [place] because it is too cold and uses too hot and they have hurricanes. She still has untreated sleep apnea and I think she probably has some anxiety issues which are understandable considering how isolated she is. I encouraged her to keep reaching out using her knowledge and skills on the computer to see if she could get some solution to her medical needs and ability to make doctors appointments. negative for chest pain, dyspnea, palpitations, syncope, orthopnea, paroxysmal nocturnal dyspnea, exertional chest pressure/discomfort, claudication, lower extremity edema    current treatment plan is effective, no change in therapy  lab results and schedule of future lab studies reviewed with patient  reviewed diet, exercise and weight control    I affirm this is a Patient Initiated Episode with an Established Patient who has not had a related appointment within my department in the past 7 days or scheduled within the next 24 hours.     Total Time: minutes: 21-30 minutes    Note: not billable if this call serves to triage the patient into an appointment for the relevant concern      Derek Velez MD CHANELL

## 2022-08-29 NOTE — TELEPHONE ENCOUNTER
Patient called stating that she has not been checked in for her  VV today at 4:00      Please advise      [phone]

## 2022-09-02 ENCOUNTER — TELEPHONE (OUTPATIENT)
Dept: FAMILY MEDICINE CLINIC | Age: 72
End: 2022-09-02

## 2022-09-05 DIAGNOSIS — I48.20 CHRONIC ATRIAL FIBRILLATION (HCC): ICD-10-CM

## 2022-09-05 DIAGNOSIS — I10 ESSENTIAL HYPERTENSION: ICD-10-CM

## 2022-09-06 RX ORDER — CLONIDINE 0.3 MG/24H
PATCH, EXTENDED RELEASE TRANSDERMAL
Qty: 4 PATCH | Refills: 5 | Status: SHIPPED | OUTPATIENT
Start: 2022-09-06 | End: 2022-09-27 | Stop reason: SDUPTHER

## 2022-09-06 NOTE — TELEPHONE ENCOUNTER
Requested Prescriptions     Signed Prescriptions Disp Refills    cloNIDine (CATAPRES) 0.3 mg/24 hr 4 Patch 5     Sig: APPLY 1 PATCH TOPICALLY TO SKIN EVERY 8 DAYS OR AS DIRECTED. BRAND NAME ONLY.      Authorizing Provider: Mechelle Watts     Ordering User: Wong Hurtado    rivaroxaban (Xarelto) 20 mg tab tablet 30 Tablet 5     Sig: TAKE ONE TABLET BY MOUTH DAILY     Authorizing Provider: Mechelle Watts     Ordering User: Wong Hurtado    Per Dr. Omar Godwin verbal orders

## 2022-09-09 ENCOUNTER — TELEPHONE (OUTPATIENT)
Dept: FAMILY MEDICINE CLINIC | Age: 72
End: 2022-09-09

## 2022-09-09 NOTE — TELEPHONE ENCOUNTER
[facility]   Phone:  [phone]      Fax:  [phone] [facility], [address] [place] [address], [address] [facility]    Name:  Lopez Kaur  YOB: 1950    Received [facility] referral from patient's cardiologist, Dr. Eric Guthrie. He says patient mentioned to him that she had spoken with this nurse previously about Home Based Primary Care. Dr. Raheem Russell says patient needs resources for in home services. See this nurse's note from 5/28/21. [facility] was unable to accept patient at time due to a full census. This nurse called patient today to discuss [facility]. No answer, no voicemail option, not able to leave a message.       BABAR LuxN, RN-BC, [facility]  Referral Navigator, [facility]

## 2022-09-14 ENCOUNTER — TELEPHONE (OUTPATIENT)
Dept: CARDIOLOGY CLINIC | Age: 72
End: 2022-09-14

## 2022-09-14 DIAGNOSIS — I10 ESSENTIAL HYPERTENSION: ICD-10-CM

## 2022-09-14 RX ORDER — BENAZEPRIL HYDROCHLORIDE 10 MG/1
TABLET ORAL
Qty: 90 TABLET | Refills: 5 | Status: SHIPPED | OUTPATIENT
Start: 2022-09-14 | End: 2022-11-03 | Stop reason: SDUPTHER

## 2022-09-14 NOTE — TELEPHONE ENCOUNTER
Requested Prescriptions     Signed Prescriptions Disp Refills    benazepriL (LOTENSIN) 10 mg tablet 90 Tablet 5     Sig: Take one tab by mouth twice daily.  May take an extra tab (10 mg) for elevated BP per Dr. Jodi Ruano advice     Authorizing Provider: Jose Rudolph     Ordering User: Sapna Bean    Per Dr. Jodi Ruano verbal orders

## 2022-09-14 NOTE — TELEPHONE ENCOUNTER
Plan medicare-d prescript soln  Cardholder id: [id number]  [facility] #: [id number]  Code: [id number]  [facility]: [id number]  n: [id number]

## 2022-09-20 ENCOUNTER — APPOINTMENT (OUTPATIENT)
Dept: CT IMAGING | Age: 72
End: 2022-09-20
Attending: EMERGENCY MEDICINE
Payer: MEDICARE

## 2022-09-20 ENCOUNTER — HOSPITAL ENCOUNTER (EMERGENCY)
Age: 72
Discharge: HOME | End: 2022-09-20
Attending: EMERGENCY MEDICINE
Payer: MEDICARE

## 2022-09-20 VITALS
TEMPERATURE: 98 F | OXYGEN SATURATION: 96 % | DIASTOLIC BLOOD PRESSURE: 70 MMHG | RESPIRATION RATE: 18 BRPM | HEART RATE: 87 BPM | SYSTOLIC BLOOD PRESSURE: 150 MMHG

## 2022-09-20 DIAGNOSIS — I10 HYPERTENSION, UNSPECIFIED TYPE: Primary | ICD-10-CM

## 2022-09-20 LAB
ALBUMIN SERPL-MCNC: 3.8 G/DL (ref 3.5–5)
ALBUMIN/GLOB SERPL: 1.1 {RATIO} (ref 1.1–2.2)
ALP SERPL-CCNC: 51 U/L (ref 45–117)
ALT SERPL-CCNC: 24 U/L (ref 12–78)
ANION GAP SERPL CALC-SCNC: 4 MMOL/L (ref 5–15)
AST SERPL-CCNC: 23 U/L (ref 15–37)
BASOPHILS # BLD: 0.1 K/UL (ref 0–0.1)
BASOPHILS NFR BLD: 1 % (ref 0–1)
BILIRUB SERPL-MCNC: 0.6 MG/DL (ref 0.2–1)
BUN SERPL-MCNC: 17 MG/DL (ref 6–20)
BUN/CREAT SERPL: 20 (ref 12–20)
CALCIUM SERPL-MCNC: 9.5 MG/DL (ref 8.5–10.1)
CHLORIDE SERPL-SCNC: 107 MMOL/L (ref 97–108)
CO2 SERPL-SCNC: 30 MMOL/L (ref 21–32)
CREAT SERPL-MCNC: 0.85 MG/DL (ref 0.55–1.02)
DIFFERENTIAL METHOD BLD: ABNORMAL
EOSINOPHIL # BLD: 0.1 K/UL (ref 0–0.4)
EOSINOPHIL NFR BLD: 1 % (ref 0–7)
ERYTHROCYTE [DISTWIDTH] IN BLOOD BY AUTOMATED COUNT: 11.9 % (ref 11.5–14.5)
EST. AVERAGE GLUCOSE BLD GHB EST-MCNC: 123 MG/DL
GLOBULIN SER CALC-MCNC: 3.5 G/DL (ref 2–4)
GLUCOSE SERPL-MCNC: 122 MG/DL (ref 65–100)
HBA1C MFR BLD: 5.9 % (ref 4–5.6)
HCT VFR BLD AUTO: 47.6 % (ref 35–47)
HGB BLD-MCNC: 15.9 G/DL (ref 11.5–16)
IMM GRANULOCYTES # BLD AUTO: 0 K/UL (ref 0–0.04)
IMM GRANULOCYTES NFR BLD AUTO: 0 % (ref 0–0.5)
LYMPHOCYTES # BLD: 1.5 K/UL (ref 0.8–3.5)
LYMPHOCYTES NFR BLD: 14 % (ref 12–49)
MCH RBC QN AUTO: 31.5 PG (ref 26–34)
MCHC RBC AUTO-ENTMCNC: 33.4 G/DL (ref 30–36.5)
MCV RBC AUTO: 94.4 FL (ref 80–99)
MONOCYTES # BLD: 0.6 K/UL (ref 0–1)
MONOCYTES NFR BLD: 5 % (ref 5–13)
NEUTS SEG # BLD: 8.5 K/UL (ref 1.8–8)
NEUTS SEG NFR BLD: 79 % (ref 32–75)
NRBC # BLD: 0 K/UL (ref 0–0.01)
NRBC BLD-RTO: 0 PER 100 WBC
PLATELET # BLD AUTO: 225 K/UL (ref 150–400)
PMV BLD AUTO: 10.3 FL (ref 8.9–12.9)
POTASSIUM SERPL-SCNC: 4.1 MMOL/L (ref 3.5–5.1)
PROT SERPL-MCNC: 7.3 G/DL (ref 6.4–8.2)
RBC # BLD AUTO: 5.04 M/UL (ref 3.8–5.2)
SODIUM SERPL-SCNC: 141 MMOL/L (ref 136–145)
WBC # BLD AUTO: 10.8 K/UL (ref 3.6–11)

## 2022-09-20 PROCEDURE — 99284 EMERGENCY DEPT VISIT MOD MDM: CPT

## 2022-09-20 PROCEDURE — 70450 CT HEAD/BRAIN W/O DYE: CPT

## 2022-09-20 PROCEDURE — 85025 COMPLETE CBC W/AUTO DIFF WBC: CPT

## 2022-09-20 PROCEDURE — 83036 HEMOGLOBIN GLYCOSYLATED A1C: CPT

## 2022-09-20 PROCEDURE — 80053 COMPREHEN METABOLIC PANEL: CPT

## 2022-09-20 PROCEDURE — 36415 COLL VENOUS BLD VENIPUNCTURE: CPT

## 2022-09-20 NOTE — ED NOTES
67-year old female with history of hypertension presented to the emergency department with concern for hypertension at home in the 230 range. On arrival her blood pressure significantly better. She also had ringing in her left ear today which she was concerned. She tells me she sits at home all day on the Internet and is concerned she may have a stroke. She sees a cardiologist who she is using as primary care for all of her medication needs but does not want a primary care doctor as she does not like to leave the apartment. She tells me she will leave the apartment once every 2 to 3 months. She is well-appearing on exam without focal neurodeficits without evidence of hypertensive emergency on her work-up. She should continue her medications as currently prescribed and follow-up with a primary care doctor for further management. She is perseverating on her blood pressure and repeatedly tells me she does not have anxiety although she appears very anxious during my evaluation. She tells me that things have been worse since her cat . Potentially she would find some improvement in her quality of life if she were to get a new pet.

## 2022-09-20 NOTE — ED PROVIDER NOTES
HPI     Rebekah Bates is a 66 y.o. female with Hx of HTN, A fib, anxiety, aneurysm, depression, scioliosis, personality d/o, OCD who presents via EMS  to [facility] ED with cc of ringing in ears. Patient reports that she had an isolated episode of ringing in her left ear today. She states that she is concerned because she took her blood pressure and it was 200s over 100s at home. She states that she was told to come to the emergency department to get evaluated by her cardiology group. Patient states that she does not have any ringing in her ears at this time. She states that she has chronic pain issues and \"throbbing all over my body, throbbing in my brain. \"  But denies this as being a headache. In route, EMS states the patient's blood pressure was 150 over 80s. Patient states that she was reading on the Internet about her symptoms and became very worried and anxious. She also states that she does not sleep very much, lives alone, and \"is agoraphobic, I do not go anywhere. \"  She is requesting to talk to [facility] real doctor. \"  She states that she feels all of her symptoms with her blood pressure started after she was put on a generic Catapres patch 3 weeks ago. She denies any visual, gait, speech disturbances. She has not had any falls or head injuries. Denies F/C, N/V/D, cough, congestion, CP, SOB, urinary concerns. Denies tobacco/ ETOH/ substance abuse. PCP: None    There are no other complaints, changes or physical findings at this time.             Past Medical History:   Diagnosis Date    Aneurysm ([address]     supraclinoid/cerebral    Arrhythmia     AFib    Arthritis     Depression     Diabetes ([address]     her doc said not she is prediabetic    Hypertension     Ill-defined condition     scoliosis    Ill-defined condition     Other ill-defined conditions(799.89)     glaucoma    Other ill-defined conditions(799.89)     increased cholesterol    Other ill-defined conditions(799.89)     scoliosis    Other ill-defined conditions(799.89)     back pain    Other ill-defined conditions(799.89)     insomnia    Other ill-defined conditions(799.89)     abnormal wt gain    Other ill-defined conditions(799.89)     breast lump - left side at 3 o'clock position    Other ill-defined conditions(799.89) 1994    shingles    Other ill-defined conditions(799.89)     CTS    Other ill-defined conditions(799.89)     colon polyps    Other ill-defined conditions(799.89)     cataracts    Other ill-defined conditions(799.89)     vitamin D deficiency - hx of    Other ill-defined conditions(799.89)     REYES    Psychiatric disorder     depression/personality disorder/OCD    Scoliosis     Sleep apnea     Unspecified sleep apnea     sleep study 15 yrs ago/was told to come back and get a CPAP, but pt did not       Past Surgical History:   Procedure Laterality Date    HX BREAST BIOPSY Right 04/12/2012    (Neg) right breast biopsy    HX GYN      endometrial ablation x 2    HX OTHER SURGICAL      colonoscopy x 1    HX TONSILLECTOMY      T & A    TX CARDIAC SURG PROCEDURE UNLIST      cardiac cath normal per pt, [facility] approx 1994         Family History:   Problem Relation Age of Onset    Diabetes Mother     Asthma Mother     Lung Disease Mother     Glaucoma Mother     Cancer Father     Heart Disease Father     Lung Disease Father     Psychiatric Disorder Father     Stroke Father     Other Sister         arthritis       Social History     Socioeconomic History    Marital status: SINGLE     Spouse name: Not on file    Number of children: Not on file    Years of education: Not on file    Highest education level: Not on file   Occupational History    Not on file   Tobacco Use    Smoking status: Never    Smokeless tobacco: Never   Substance and Sexual Activity    Alcohol use: No     Alcohol/week: 0.0 standard drinks    Drug use: No    Sexual activity: Not on file   Other Topics Concern    Not on file   Social History Narrative    Not on file     Social Determinants of Health     Financial Resource Strain: Not on file   Food Insecurity: Not on file   Transportation Needs: Not on file   Physical Activity: Not on file   Stress: Not on file   Social Connections: Not on file   Intimate Partner Violence: Not on file   Housing Stability: Not on file         ALLERGIES: Latex, Cardizem [diltiazem hcl], Ceclor [cefaclor], Coffee (coffea arabica), Flexeril [cyclobenzaprine], Green tea, Levaquin [levofloxacin], Ludiomil, Macrodantin [nitrofurantoin macrocrystalline], Other medication, Plendil [felodipine], Prinivil [lisinopril], Procardia [nifedipine], Provera [medroxyprogesterone], Shellfish derived, Singulair [montelukast], Tenoretic 100 [atenolol-chlorthalidone], Tenormin [atenolol], Vistaril [hydroxyzine pamoate], and Zocor [simvastatin]    Review of Systems   Constitutional:  Negative for activity change, appetite change, chills and fever. HENT:  Negative for congestion, rhinorrhea and sore throat. Ringing in L ear      Eyes:  Negative for visual disturbance. Respiratory:  Negative for cough and shortness of breath. Cardiovascular:  Negative for chest pain. Gastrointestinal:  Negative for abdominal pain, diarrhea, nausea and vomiting. Genitourinary:  Negative for dysuria, flank pain, frequency and urgency. Musculoskeletal:  Positive for myalgias (chronic). Negative for arthralgias and neck pain. Skin:  Negative for color change and rash. Neurological:  Negative for dizziness, weakness, light-headedness and headaches. Psychiatric/Behavioral:  Positive for sleep disturbance. Negative for agitation, behavioral problems and confusion. The patient is nervous/anxious. All other systems reviewed and are negative. Vitals:    09/20/22 1636 09/20/22 1705 09/20/22 1707   BP: (!) 144/81 139/77 (!) 150/70   Pulse: 87     Resp: 18     Temp: 98 °F (36.7 °C)     SpO2: 96%              Physical Exam  Vitals and nursing note reviewed.    Constitutional: General: She is not in acute distress. Appearance: She is well-developed. HENT:      Head: Normocephalic and atraumatic. Right Ear: External ear normal.      Left Ear: External ear normal.   Eyes:      Conjunctiva/sclera: Conjunctivae normal.      Pupils: Pupils are equal, round, and reactive to light. Cardiovascular:      Rate and Rhythm: Normal rate and regular rhythm. Heart sounds: Normal heart sounds. Pulmonary:      Effort: Pulmonary effort is normal.      Breath sounds: Normal breath sounds. Musculoskeletal:         General: Normal range of motion. Cervical back: Normal range of motion and neck supple. Skin:     General: Skin is warm and dry. Neurological:      General: No focal deficit present. Mental Status: She is alert and oriented to person, place, and time. Sensory: Sensation is intact. Motor: Motor function is intact. Comments: Moving self backward and forward in [facility] throughout exam    Psychiatric:         Attention and Perception: She is inattentive. Mood and Affect: Mood is anxious. Speech: Speech is rapid and pressured. Behavior: Behavior is agitated. Thought Content: Thought content normal.         Cognition and Memory: Cognition and memory normal.         Judgment: Judgment normal.        MDM  Number of Diagnoses or Management Options  Hypertension, unspecified type  Diagnosis management comments: DDx: elevated BP reading, anxiety, panic, AICP     Patient presents to the emergency department with concerns about her blood pressure, and isolated episode of ringing in her ears. Her blood pressure was checked multiple times while in the emergency department and without malignant findings. Pt repeatedly stated \"I have an Omricon cuff, those are the best and it was high at home\" when provided with information about her BP readings.   Her lab work was without any acute or emergent findings, CT head was without acute or emergent findings. Patient had multiple concerns, states that she lives alone [facility] just send her back to her house at time of discharge. \"  Patient was offered transportation. She states that she was going to work out someone to come pick her up. We have encouraged her to follow-up with Dr. Dione Hadley, her cardiologist as well as obtaining primary care provider for ongoing management of her multiple medical concerns. Amount and/or Complexity of Data Reviewed  Clinical lab tests: reviewed and ordered  Tests in the radiology section of CPT®: reviewed and ordered  Review and summarize past medical records: yes  Discuss the patient with other providers: yes      ED Course as of 09/21/22 0011   Tue Sep 20, 2022   0137 Patient evaluated at the request of the ACP after patient requested MD evaluation. She presented for high blood pressure and is concerned that since she switched from brand-name Catapres to generic clonidine she has been having difficulty controlling her blood pressures (several weeks ago). She has no primary care doctor and relies on her cardiologist for primary care. She tells me she only leaves the department every 2 to 3 months. She appears to have significant anxiety although she repeatedly tells me she does not have anxiety. She seems to perseverate on her blood pressure and is concerned about having a stroke. She requests hemoglobin A1c testing as well. She complains of a whooshing sound in her left ear which has resolved now with her blood pressure has normalized more. At this point will provide basic lab testing, order A1c with follow-up with primary care, CT head. She would likely benefit from case management consultation to follow-up with her as an outpatient.  [JM]      ED Course User Index  [JM] Marilou Sharp MD       Procedures    Presentation, management, and disposition were discussed with the attending physician, Dr. Tracie Gitelman, who is in agreement with plan of care. Patient is over the age of 70 and at request of patient , and the attending will see the patient. LABORATORY TESTS:  Recent Results (from the past 12 hour(s))   CBC WITH AUTOMATED DIFF    Collection Time: 09/20/22  6:05 PM   Result Value Ref Range    WBC 10.8 3.6 - 11.0 K/uL    RBC 5.04 3.80 - 5.20 M/uL    HGB 15.9 11.5 - 16.0 g/dL    HCT 47.6 (H) 35.0 - 47.0 %    MCV 94.4 80.0 - 99.0 FL    MCH 31.5 26.0 - 34.0 PG    MCHC 33.4 30.0 - 36.5 g/dL    RDW 11.9 11.5 - 14.5 %    PLATELET 624 742 - 633 K/uL    MPV 10.3 8.9 - 12.9 FL    NRBC 0.0 0  WBC    ABSOLUTE NRBC 0.00 0.00 - 0.01 K/uL    NEUTROPHILS 79 (H) 32 - 75 %    LYMPHOCYTES 14 12 - 49 %    MONOCYTES 5 5 - 13 %    EOSINOPHILS 1 0 - 7 %    BASOPHILS 1 0 - 1 %    IMMATURE GRANULOCYTES 0 0.0 - 0.5 %    ABS. NEUTROPHILS 8.5 (H) 1.8 - 8.0 K/UL    ABS. LYMPHOCYTES 1.5 0.8 - 3.5 K/UL    ABS. MONOCYTES 0.6 0.0 - 1.0 K/UL    ABS. EOSINOPHILS 0.1 0.0 - 0.4 K/UL    ABS. BASOPHILS 0.1 0.0 - 0.1 K/UL    ABS. IMM. GRANS. 0.0 0.00 - 0.04 K/UL    DF AUTOMATED     METABOLIC PANEL, COMPREHENSIVE    Collection Time: 09/20/22  6:05 PM   Result Value Ref Range    Sodium 141 136 - 145 mmol/L    Potassium 4.1 3.5 - 5.1 mmol/L    Chloride 107 97 - 108 mmol/L    CO2 30 21 - 32 mmol/L    Anion gap 4 (L) 5 - 15 mmol/L    Glucose 122 (H) 65 - 100 mg/dL    BUN 17 6 - 20 MG/DL    Creatinine 0.85 0.55 - 1.02 MG/DL    BUN/Creatinine ratio 20 12 - 20      GFR est AA >60 >60 ml/min/1.73m2    GFR est non-AA >60 >60 ml/min/1.73m2    Calcium 9.5 8.5 - 10.1 MG/DL    Bilirubin, total 0.6 0.2 - 1.0 MG/DL    ALT (SGPT) 24 12 - 78 U/L    AST (SGOT) 23 15 - 37 U/L    Alk.  phosphatase 51 45 - 117 U/L    Protein, total 7.3 6.4 - 8.2 g/dL    Albumin 3.8 3.5 - 5.0 g/dL    Globulin 3.5 2.0 - 4.0 g/dL    A-G Ratio 1.1 1.1 - 2.2     HEMOGLOBIN A1C WITH EAG    Collection Time: 09/20/22  6:05 PM   Result Value Ref Range    Hemoglobin A1c 5.9 (H) 4.0 - 5.6 % Est. average glucose 123 mg/dL       IMAGING RESULTS:  CT HEAD WO CONT   Final Result   1. No evidence of acute intracranial abnormality. MEDICATIONS GIVEN:  Medications - No data to display    IMPRESSION:  1. Hypertension, unspecified type        PLAN:  1. Discharge Medication List as of 9/20/2022  7:00 PM        2. Follow-up Information       Follow up With Specialties Details Why [address], MD Cardiovascular Disease Physician   [address]  [facility]. [facility] [address] [id number]  [phone]      [address] [facility]  If symptoms worsen [address] [facility]  [phone]    your primary care doctor  Schedule an appointment as soon as possible for a visit in 1 week For re-evaluation           3.  Return to ED if worse

## 2022-09-20 NOTE — DISCHARGE INSTRUCTIONS
Your blood pressure, labs, imaging have been reassuring while in the emergency department today. Testing for diabetes was sent and can be followed up by your primary care doctor. We recommend you follow-up with a primary care doctor for further management of your chronic medical problems. There is no evidence of stroke or other hypertensive emergency today. You may find increased satisfaction/quality of life if you replace the cat which you previously had and passed away. [facility] is a good place to adopt a pet and is found at [address]    Thank you for allowing us to provide you with medical care today. We realize that you have many choices for your emergency care needs. We thank you for choosing [employer]. Please choose us in the future for any continued health care needs. We hope we addressed all of your medical concerns. We strive to provide excellent quality care in the Emergency Department. Anything less than excellent does not meet our expectations. The exam and treatment you received in the Emergency Department were for an emergent problem and are not intended as complete care. It is important that you follow up with a doctor, nurse practitioner, or physician's assistant for ongoing care. If your symptoms worsen or you do not improve as expected and you are unable to reach your usual health care provider, you should return to the Emergency Department. We are available 24 hours a day. Take this sheet with you when you go to your follow-up visit. If you have any problem arranging the follow-up visit, contact the Emergency Department immediately. Make an appointment your family doctor for follow up of this visit. Return to the ER if you are unable to be seen in a timely manner.

## 2022-09-20 NOTE — ED TRIAGE NOTES
Arrived via EMS from home with BP of 231/180 with ringing in ears and headache for 1 hour.      BP /85

## 2022-09-23 ENCOUNTER — TELEPHONE (OUTPATIENT)
Dept: CARDIOLOGY CLINIC | Age: 72
End: 2022-09-23

## 2022-09-23 DIAGNOSIS — I10 ESSENTIAL HYPERTENSION: ICD-10-CM

## 2022-09-23 NOTE — TELEPHONE ENCOUNTER
Patient is calling to notify us that she was in the hospital due to high BP , it was 214/113. I will schedule her a hospital follow up. Please Advise.      [phone]

## 2022-09-27 RX ORDER — CLONIDINE 0.3 MG/24H
PATCH, EXTENDED RELEASE TRANSDERMAL
Qty: 4 PATCH | Refills: 5 | Status: SHIPPED | OUTPATIENT
Start: 2022-09-27

## 2022-09-27 NOTE — TELEPHONE ENCOUNTER
Called pt. She said she went to the ER due to BP >200/100, a tone in her ear that wouldn't go away. She said she was told that the generic clonidine does not work as well as brand, maybe why high BP and sxs. She had heard before there was no longer a brand, but said she heard there is currently a brand. Ordering rx for brand only. Patient also discussed other concerns, such as finding a new doctor when Dr. Caro Summers one day, getting to the dentist for cleaning/dental work, going out of the house in general, etc. Spent time discussing patient's concerns, BPs, monitoring, etc. Discussed signs and symptoms qualifying urgent care or call to office. Patient verbalized understanding and denied further questions or concerns. Requested Prescriptions     Signed Prescriptions Disp Refills    cloNIDine (CATAPRES) 0.3 mg/24 hr 4 Patch 5     Sig: APPLY 1 PATCH TOPICALLY TO SKIN EVERY 8 DAYS OR AS DIRECTED. BRAND NAME ONLY. Authorizing Provider: Scot Ellis     Ordering User: Marcio Choi    Per Dr. Blaine Martinez verbal orders     Rx for brand only sent to pharmacy.

## 2022-09-27 NOTE — TELEPHONE ENCOUNTER
Patient is calling because she would like to discuss her blood pressure with the nurse and she has some other things she would like to discuss.     [phone]

## 2022-09-29 NOTE — TELEPHONE ENCOUNTER
I am aware. Pt and I talked about this yesterday.     Requested rx card info from [facility]

## 2022-09-29 NOTE — TELEPHONE ENCOUNTER
Pt called about the catapes 0.3mg, pt stated that the medication needs a prior auth, pt stated the pharmacy stated the prior auth should be sent to her medicare part D, please advise    Pt # [phone]

## 2022-10-07 NOTE — TELEPHONE ENCOUNTER
Received notice brand rina was already approved effective 1/1/22 to 12/31/2022. Faxed notice to  [facility].

## 2022-10-18 ENCOUNTER — TELEPHONE (OUTPATIENT)
Dept: CARDIOLOGY CLINIC | Age: 72
End: 2022-10-18

## 2022-10-18 NOTE — TELEPHONE ENCOUNTER
Patient called stating her blood pressure is 170/80, patient states she takes her med all the [place], the medicine does not seem to work she states.       LXUPY:[phone]

## 2022-10-18 NOTE — TELEPHONE ENCOUNTER
I called and spoke with the patient, two identifiers verified. Highest home BP reading today by patient was 180/100. Most recent BP: 148/84. Denies cardiac and/or stroke -like symptoms. Patient stated that her insurance covered generic form of Clonidine, but it is not working for her. According to her, she has been using branded form before for long time and it worked well. Unfortunately, her insurance covers generic form only, and her co-pay for branded will cost her around $800.00. I called [facility] [employer], I spoke with Mason Gonzalez. He stated that he spoke with patient about the Clonidie patch and notified her that the Catapres brand production that the patient prefers has been discontinued and the new company called [employer] who bought the Catapres does not recognize by Medicare.

## 2022-10-19 NOTE — TELEPHONE ENCOUNTER
Verified patient using two identifiers. Advised that she has amlodipine 5mg PRN to take to assist with her BP. States she took 1/2 of one pill yesterday and this was not enough. Provided education on medications as she was requesting to take 5 benazepril daily vs. the amlodipine. Stated she should try taking the amlodipine as ordered and continue to log her blood pressure. No further questions at this time.

## 2022-10-26 DIAGNOSIS — E78.2 MIXED HYPERLIPIDEMIA: ICD-10-CM

## 2022-10-28 RX ORDER — LOVASTATIN 20 MG/1
TABLET ORAL
Qty: 90 TABLET | Refills: 0 | Status: SHIPPED | OUTPATIENT
Start: 2022-10-28 | End: 2022-11-28

## 2022-11-01 ENCOUNTER — TELEPHONE (OUTPATIENT)
Dept: CARDIOLOGY CLINIC | Age: 72
End: 2022-11-01

## 2022-11-01 DIAGNOSIS — I10 ESSENTIAL HYPERTENSION: ICD-10-CM

## 2022-11-01 NOTE — TELEPHONE ENCOUNTER
Patient reported to [employer] her benazepril was increased to 4 times daily. Your note says 2-3 times daily. Is it okay for her to take 4 times daily if needed?

## 2022-11-03 RX ORDER — BENAZEPRIL HYDROCHLORIDE 10 MG/1
TABLET ORAL
Qty: 180 TABLET | Refills: 5 | Status: SHIPPED | OUTPATIENT
Start: 2022-11-03

## 2022-11-03 NOTE — TELEPHONE ENCOUNTER
Sent rx to pharmacy with note \"max 40 mg (4 tabs) daily. \"     Requested Prescriptions     Signed Prescriptions Disp Refills    benazepriL (LOTENSIN) 10 mg tablet 180 Tablet 5     Sig: Take one tab by mouth twice daily. May take an extra tab (10 mg) for elevated BP, max 40 mg (4 tabs) daily.      Authorizing Provider: Rachna Shanks     Ordering User: Ehsan Blackburn   Per Dr. Camden Baltazar verbal orders

## 2022-11-07 ENCOUNTER — TELEPHONE (OUTPATIENT)
Dept: CARDIOLOGY CLINIC | Age: 72
End: 2022-11-07

## 2022-11-07 NOTE — TELEPHONE ENCOUNTER
Called pt. Verified patient's identity with two identifiers. She wants antibiotic for tooth infection to prevent endocarditis. Explained cardiology does not usually give antibiotics for pts for dentist appointments unless they have had a valve replacement or some other procedure. Pt seeing dentist Thursday and asked that I message Dr. Marianela Aquino to ask if he will still call it in. I told her I would and will call her back tomorrow. If I am not here, message will be left in chart and PSR can let her know. Patient also had several concerns about BP before dentist appointment, meds, etc. Discussed all of this with pt. Will call her tomorrow.

## 2022-11-08 NOTE — TELEPHONE ENCOUNTER
Called pt. Phone rang about 10 times no answer. Will try back. Ok to let her know Dr. Wilver Brandt advised she does not need an antibiotic from cardiac standpoint.

## 2022-11-09 NOTE — TELEPHONE ENCOUNTER
If patient calls back, please tell her Dr. Roberto Chen said no need for antibiotic from cardiac standpoint.

## 2022-11-11 NOTE — TELEPHONE ENCOUNTER
Patient is calling to speak with the nurse. Patient doesn't want to go into detail.     Please assist.    [phone]

## 2022-11-15 ENCOUNTER — TELEPHONE (OUTPATIENT)
Dept: CARDIOLOGY CLINIC | Age: 72
End: 2022-11-15

## 2022-11-15 DIAGNOSIS — I10 ESSENTIAL HYPERTENSION: Primary | ICD-10-CM

## 2022-11-15 RX ORDER — AMLODIPINE BESYLATE 5 MG/1
5 TABLET ORAL DAILY
COMMUNITY

## 2022-11-15 RX ORDER — CLONIDINE HYDROCHLORIDE 0.1 MG/1
TABLET ORAL
Qty: 10 TABLET | Refills: 0 | Status: SHIPPED | OUTPATIENT
Start: 2022-11-15

## 2022-11-15 NOTE — TELEPHONE ENCOUNTER
Called pt. Verified patient's identity with two identifiers. She has had abscessed tooth for 2 weeks, her entist did give her an antibiotic last week, but she did not go to dentist appointment because her BP was too high. She has had BP up to 200/100 and ended up in ER for it. She is scheduled now to see dentist on Friday, but stated she needs new BP med to take day of dentist visit and a couple days prior. I told her one option is to double amlodipine to 10 mg those days, along with max amount of other meds. She does not think that will be enough and asked for new med also. I told her I would message Dr. Dawn Mcfadden to advise. If okay, will send new and call her. Patient verbalized understanding and denied further questions or concerns.

## 2022-11-15 NOTE — TELEPHONE ENCOUNTER
Yes, increase amlodipine to 10mg. Also, if she has any 0.1mg clonidine tablets, she could take an extra 1 or 2 tablets in a 4hr period to try and get pressure down.

## 2022-11-15 NOTE — TELEPHONE ENCOUNTER
Called pt. Notified her of Dr. Lisa Pastor message. Asked if she had any of he clonidine tabs from the past. She does not have any and has only been on the patch for several years. I said maybe we could call in just a couple clonidine 0.1 mg tabs. Pt stated she read on the interne there is a manufacturing problem with clonidine tabs and is worried because wears patch. I told her the tab would be an exception and one day addition for dentist appt she needs to go to. I said I would verify with Dr. Jodie Hudson and call her back. If he says go ahead with clonidine will send in and she will try with amlodipine, but if something else will call that in. Either way, will call her back. Discussed with Dr. Wade Palomo and he advised call in 10 tabs clonidine 0.1 mg as stated in his order. Called pt. Phone rang >60 sec. Hung up and no VM to LM. Will try her back tomorrow morning. Rx sent to pharmacy.     Requested Prescriptions     Signed Prescriptions Disp Refills    cloNIDine HCL (CATAPRES) 0.1 mg tablet 10 Tablet 0     Sig: Take 1-2 tablets by mouth (max 2 tabs in 4 hour period) for elevated BP (SBP>160)     Authorizing Provider: Ama Hargrove     Ordering User: Melani Mario    Per Dr. Lisa Pastor verbal orders

## 2022-11-16 NOTE — TELEPHONE ENCOUNTER
Patient called stating she is still using the patch and is requesting an alternative med.         RPQZM:[phone]

## 2022-11-16 NOTE — TELEPHONE ENCOUNTER
Pt returned a call to the nurse      Pt # [phone] please allow phone to ring awhile per pt, hard time getting to the phone

## 2022-11-16 NOTE — TELEPHONE ENCOUNTER
Called pt. Explained Dr. Jaydon Smalls said okay for 1 time use for dentist appt for her to take clonidine 0.1 mg tab (max 2 tabs in 4 hrs) in addition to patch. Patient verbalized understanding and denied further questions or concerns.

## 2022-11-27 DIAGNOSIS — E78.2 MIXED HYPERLIPIDEMIA: ICD-10-CM

## 2022-11-28 RX ORDER — LOVASTATIN 20 MG/1
TABLET ORAL
Qty: 90 TABLET | Refills: 1 | Status: SHIPPED | OUTPATIENT
Start: 2022-11-28

## 2022-11-28 NOTE — TELEPHONE ENCOUNTER
Requested Prescriptions     Signed Prescriptions Disp Refills    lovastatin (MEVACOR) 20 mg tablet 90 Tablet 1     Sig: TAKE ONE TABLET BY MOUTH ONCE NIGHTLY.      Authorizing Provider: Nakia Ramirez     Ordering User: Kristy Kitchen    Per Dr. Sophy Maldonado verbal orders

## 2022-12-01 DIAGNOSIS — I10 ESSENTIAL HYPERTENSION: ICD-10-CM

## 2022-12-02 ENCOUNTER — TELEPHONE (OUTPATIENT)
Dept: CARDIOLOGY CLINIC | Age: 72
End: 2022-12-02

## 2022-12-02 DIAGNOSIS — I10 ESSENTIAL HYPERTENSION: ICD-10-CM

## 2022-12-02 RX ORDER — CLONIDINE HYDROCHLORIDE 0.1 MG/1
TABLET ORAL
Qty: 10 TABLET | Refills: 0 | Status: SHIPPED | OUTPATIENT
Start: 2022-12-02 | End: 2022-12-02 | Stop reason: SDUPTHER

## 2022-12-02 RX ORDER — CLONIDINE HYDROCHLORIDE 0.1 MG/1
TABLET ORAL
Qty: 10 TABLET | Refills: 0 | Status: SHIPPED | OUTPATIENT
Start: 2022-12-02

## 2022-12-02 NOTE — TELEPHONE ENCOUNTER
Called pharmacy. Pharmacy was closed (probably for lunch). Left message with specific instructions, including med in \"prn\" and \"in addition to patch. \"    Requested Prescriptions     Signed Prescriptions Disp Refills    cloNIDine HCL (CATAPRES) 0.1 mg tablet 10 Tablet 0     Sig: TAKE 1 TO 2 TABLETS BY MOUTH AS NEEDED AS DIRECTED FOR ELEVATED BLOOD PRESSURE (SYSTOLIC FOR BLOOD PRESSURE OVER 160). **MAXIMUM OF 2 TABLETS IN A 4 HOUR PERIOD** this is in addition to patch.      Authorizing Provider: Geovany Lowe     Ordering User: Denise Silva    Per Dr. Pawan Rivera verbal orders

## 2022-12-02 NOTE — TELEPHONE ENCOUNTER
Requested Prescriptions     Signed Prescriptions Disp Refills    cloNIDine HCL (CATAPRES) 0.1 mg tablet 10 Tablet 0     Sig: TAKE 1 TO 2 TABLETS BY MOUTH AS DIRECTED FOR ELEVATED BLOOD PRESSURE (SYSTOLIC FOR BLOOD PRESSURE OVER 160).  **MAXIMUM OF 2 TABLETS IN A 4 HOUR PERIOD**     Authorizing Provider: Omar Mcneil     Ordering User: Federico Mccurdy    Per Dr. Supa Goldsmith verbal orders

## 2022-12-02 NOTE — TELEPHONE ENCOUNTER
Requesting clarification on the prescription received for cloNIDine HCL (CATAPRES) 0.1 mg tablet     1.regarding the instructions    2. insurance does not cover the brand name. 3. Inquiring whether the patient is to use the patch as well as the tablet.       1 [facility] [place] [phone]

## 2022-12-19 ENCOUNTER — TELEPHONE (OUTPATIENT)
Dept: SLEEP MEDICINE | Age: 72
End: 2022-12-19

## 2022-12-19 NOTE — TELEPHONE ENCOUNTER
Received DWO for patient supplies but hasnt been seen since last November. Patient historically a shut in. Requesting techs see if we have remote access for VV option? DWO unsigned scanned in media for after follow up.

## 2022-12-21 ENCOUNTER — TELEPHONE (OUTPATIENT)
Dept: CARDIOLOGY CLINIC | Age: 72
End: 2022-12-21

## 2022-12-21 DIAGNOSIS — I10 ESSENTIAL HYPERTENSION: ICD-10-CM

## 2022-12-21 RX ORDER — BENAZEPRIL HYDROCHLORIDE 10 MG/1
TABLET ORAL
Qty: 180 TABLET | Refills: 5 | Status: SHIPPED | OUTPATIENT
Start: 2022-12-21

## 2022-12-21 NOTE — TELEPHONE ENCOUNTER
Requested Prescriptions     Signed Prescriptions Disp Refills    benazepriL (LOTENSIN) 10 mg tablet 180 Tablet 5     Sig: Take one tab by mouth twice daily. May take an extra tab (10 mg) for elevated BP, max 40 mg (4 tabs) daily.      Authorizing Provider: David Dalton     Ordering User: Mónica Osman    Per Dr. Maryellen Chinchilla verbal orders     Sent refill with note to pharmacy to fill early and pt will pay out of pocket

## 2022-12-21 NOTE — TELEPHONE ENCOUNTER
Pt stated she opened her pill bottle for the benazepril 10mg and accidentally dropped them in the sink, pt only has about a week left and it is not time for a refill, pt stated she will pay for the refill, please advise        Renae Neff [phone]

## 2022-12-28 NOTE — TELEPHONE ENCOUNTER
Called to offer \"AUDIO VV in [place] YEARLY PAP FOLLOW UP- DWO unsigned in media\". Unable to reach patient or leave message. Mailing letter.

## 2023-01-25 ENCOUNTER — VIRTUAL VISIT (OUTPATIENT)
Dept: SLEEP MEDICINE | Age: 73
End: 2023-01-25
Payer: MEDICARE

## 2023-01-25 ENCOUNTER — TELEPHONE (OUTPATIENT)
Dept: SLEEP MEDICINE | Age: 73
End: 2023-01-25

## 2023-01-25 ENCOUNTER — DOCUMENTATION ONLY (OUTPATIENT)
Dept: SLEEP MEDICINE | Age: 73
End: 2023-01-25

## 2023-01-25 DIAGNOSIS — G47.33 OBSTRUCTIVE SLEEP APNEA (ADULT) (PEDIATRIC): Primary | ICD-10-CM

## 2023-01-25 PROCEDURE — G9899 SCRN MAM PERF RSLTS DOC: HCPCS | Performed by: NURSE PRACTITIONER

## 2023-01-25 PROCEDURE — 1101F PT FALLS ASSESS-DOCD LE1/YR: CPT | Performed by: NURSE PRACTITIONER

## 2023-01-25 PROCEDURE — 99443 PR PHYS/QHP TELEPHONE EVALUATION 21-30 MIN: CPT | Performed by: NURSE PRACTITIONER

## 2023-01-25 NOTE — PROGRESS NOTES
Letty Mckenzie (: 1950) is a 74 y.o. female, established patient, seen for positive airway pressure follow-up, she was last seen by me on 2021, previously seen by Dr. Aysha Mohamud on 2019, prior notes reviewed in detail. In lab split sleep test 3/2014 showed AHI of 11.5/hr with a lowest SaO2 of 86%. ASSESSMENT/PLAN:    ICD-10-CM ICD-9-CM    1. Obstructive sleep apnea (adult) (pediatric)  G47.33 327.23 AMB SUPPLY ORDER      2. Adult BMI 34.0-34.9 kg/sq m  Z68.34 V85.34           AHI = 11.5(3/2014). On [employer]Med APAP :  10-11 cmH2O. Set up 2020. She is adherent with PAP therapy and PAP continues to benefit patient and remains necessary for control of her sleep apnea. Follow-up and Dispositions    Return in about 1 year (around 2024) for annual follow up - in person. Sleep Apnea -   Continue on current pressures    *  Supplies ordered - full face mask and heated tubing    Orders Placed This Encounter    AMB SUPPLY ORDER     Diagnosis: (G47.33) BERNIE (obstructive sleep apnea)  (primary encounter diagnosis)     Replacement Supplies for Positive Airway Pressure Therapy Device:   Duration of need: 99 months.  Full Face Mask 1 every 3 months.  Full Face Mask Cushion 1 per month.  Headgear 1 every 6 months.  Pos Airway pressure chin strap     Tubing with heating element 1 every 3 months.  Filter(s) Disposable 2 per month.  Filter(s) Non-Disposable 1 every 6 months. .   [facility] for Humidifier (Replace) 1 every 6 months. CATHERINE Szymanski-BC; NPI: [id number]    Electronically signed. Date:- 23       * Re-enforced proper and regular cleaning for the device. * She was asked to contact our office for any problems regarding PAP therapy. 2. Scoliosis - she notes that she is most comfortable when she sleeps flat on her back, she cannot use a pillow.   PAP device does help with breathing in this position. 3. Encouraged continued weight management program through appropriate diet and exercise regimen as significant weight reduction has been shown to reduce severity of obstructive sleep apnea. She has lost about 10 pounds since prior visit. SUBJECTIVE/OBJECTIVE:    She  is seen today for follow up on PAP device and reports some problems using the device. The following concerns reviewed:    Drowsiness no Problems exhaling no   Snoring no Forget to put on no   Mask Comfortable yes Can't fall asleep no   Dry Mouth no Mask falls off no   Air Leaking sometimes Frequent awakenings yes       She admits that her sleep has improved on PAP therapy using full face mask and heated tubing but she is still awakening at night. She reports she is a \"shut-in\" and unable to leave her home. Her prior 2 visits were audio only, she was last seen 8/7/2019. She reports that her upstairs neighbors are keeping her awake. She has been sleeping in the morning. She declines in lab sleep testing at this. Review of device download indicated:  Auto pressure: 10-11 cmH2O; Max Pressure: 10.9 cmH2O;  95th percentile Pressure: 10.9 cmH2O   95th Percentile Leak: 43.8 L/Min     % Used Days >= 4 hours: 97.  Avg hours used:  8:48. Therapy Apnea Index averaged over PAP use: 4.2 /hr which reflects improved sleep breathing condition. [place] Sleepiness Score: 5 and Modified F.O.S.Q. Score Total / 2: 15.5 which reflects improved sleep quality over therapy time. CO2 30 mmol/L on labs 9/20/2022. She denies headaches. Sleep Review of Systems: notable for Negative difficulty falling asleep; Positive awakenings at night; Negative early morning headaches; Negative memory problems; Negative concentration issues;  Negative chest pain; Negative shortness of breath; Negative significant joint pain at night; Negative rashes or itching; Negative heartburn; Negative significant mood issues; daily naps    Vitals reported by patient Patient-Reported Vitals 1/25/2023   Patient-Reported Weight 190lb   Patient-Reported Pulse 76   Patient-Reported Systolic  249   Patient-Reported Diastolic 78      Calculated BMI 34    Physical Exam not completed due to audio only visit. She was advised that future visit will need to be in person with video capability if done virtually. Gretel De La O, who was evaluated through a synchronous (real-time) audio only encounter. The patient (or guardian if applicable) is aware that this is a billable service, which includes applicable co-pays. This Virtual Visit was conducted with patient's (and/or legal guardian's) consent. The visit was conducted pursuant to the emergency declaration under the [address] [place] [address], [address] [place] [address] authority and the [employer] General Act. Patient identification was verified, and a caregiver was present when appropriate. The patient was located at: Home: [place] [phone]  The provider was located at: Facility ([facility]): [address] [place] [phone]    On this date 01/25/2023 I have spent 25 minutes reviewing previous notes, test results and face to face with the patient discussing the diagnosis and importance of compliance with the treatment plan as well as documenting on the day of the visit. Patient's phone number [phone] (home)  was confirmed for accuracy. She gives permission for messages regarding results and appointments to be left at that number. An electronic signature was used to authenticate this note.     -- Miriam Au NP, [facility]  01/25/23

## 2023-01-25 NOTE — PATIENT INSTRUCTIONS
[address] [facility] [place]. [place], [address]  Tel.  [phone]  Fax. [address] [facility] [address]  [place], [address] [facility]  Tel.  [phone]  Fax. [phone] [address]  Tel.  [phone]  Fax. [phone]     Learning About CPAP for Sleep Apnea  What is CPAP? CPAP is a small machine that you use at home every night while you sleep. It increases air pressure in your throat to keep your airway open. When you have sleep apnea, this can help you sleep better so you feel much better. CPAP stands for \"continuous positive airway pressure. \"  The CPAP machine will have one of the following:  A mask that covers your nose and mouth  Prongs that fit into your nose  A mask that covers your nose only, the most common type. This type is called NCPAP. The N stands for \"nasal.\"  Why is it done? CPAP is usually the best treatment for obstructive sleep apnea. It is the first treatment choice and the most widely used. Your doctor may suggest CPAP if you have: Moderate to severe sleep apnea. Sleep apnea and coronary artery disease (CAD) or heart failure. How does it help? CPAP can help you have more normal sleep, so you feel less sleepy and more alert during the daytime. CPAP may help keep heart failure or other heart problems from getting worse. NCPAP may help lower your blood pressure. If you use CPAP, your bed partner may also sleep better because you are not snoring or restless. What are the side effects? Some people who use CPAP have:  A dry or stuffy nose and a sore throat. Irritated skin on the face. Sore eyes. Bloating. If you have any of these problems, work with your doctor to fix them. Here are some things you can try:  Be sure the mask or nasal prongs fit well. See if your doctor can adjust the pressure of your CPAP. If your nose is dry, try a humidifier.   If your nose is runny or stuffy, try decongestant medicine or a steroid nasal spray. If these things do not help, you might try a different type of machine. Some machines have air pressure that adjusts on its own. Others have air pressures that are different when you breathe in than when you breathe out. This may reduce discomfort caused by too much pressure in your nose. Where can you learn more? Go to [employer]ate[employer]be  Enter Laurence Luz in the search box to learn more about \"Learning About CPAP for Sleep Apnea. \"   © 2888-9813 Healthwise, Incorporated. Care instructions adapted under license by [facility] (which disclaims liability or warranty for this information). This care instruction is for use with your licensed healthcare professional. If you have questions about a medical condition or this instruction, always ask your healthcare professional. [address] any warranty or liability for your use of this information. Content Version: 7.3.56528; Last Revised: January 11, 2010  PROPER SLEEP HYGIENE    What to avoid  Do not have drinks with caffeine, such as coffee or black tea, for 8 hours before bed. Do not smoke or use other types of tobacco near bedtime. Nicotine is a stimulant and can keep you awake. Avoid drinking alcohol late in the evening, because it can cause you to wake in the middle of the night. Do not eat a big meal close to bedtime. If you are hungry, eat a light snack. Do not drink a lot of water close to bedtime, because the need to urinate may wake you up during the night. Do not read or watch TV in bed. Use the bed only for sleeping and sexual activity. What to try  Go to bed at the same time every night, and wake up at the same time every morning. Do not take naps during the day. Keep your bedroom quiet, dark, and cool. Get regular exercise, but not within 3 to 4 hours of your bedtime. .  Sleep on a comfortable pillow and mattress.   If watching the clock makes you anxious, turn it facing away from you so you cannot see the time. If you worry when you lie down, start a worry book. Well before bedtime, write down your worries, and then set the book and your concerns aside. Try meditation or other relaxation techniques before you go to bed. If you cannot fall asleep, get up and go to another room until you feel sleepy. Do something relaxing. Repeat your bedtime routine before you go to bed again. Make your house quiet and calm about an hour before bedtime. Turn down the lights, turn off the TV, log off the computer, and turn down the volume on music. This can help you relax after a busy day. Drowsy Driving: The [employer] cites drowsiness as a causing factor in more than 602,590 police reported crashes annually, resulting in 76,000 injuries and 1,500 deaths. Other surveys suggest 55% of people polled have driven while drowsy in the past year, 23% had fallen asleep but not crashed, 3% crashed, and 2% had and accident due to drowsy driving. Who is at risk? Young Drivers: One study of drowsy driving accidents states that 55% of the drivers were under 25 years. Of those, 75% were male. Shift Workers and Travelers: People who work overnight or travel across time zones frequently are at higher risk of experiencing Circadian Rhythm Disorders. They are trying to work and function when their body is programed to sleep. Sleep Deprived: Lack of sleep has a serious impact on your ability to pay attention or focus on a task. Consistently getting less than the average of 8 hours your body needs creates partial or cumulative sleep deprivation. Untreated Sleep Disorders: Sleep Apnea, Narcolepsy, R.L.S., and other sleep disorders (untreated) prevent a person from getting enough restful sleep. This leads to excessive daytime sleepiness and increases the risk for drowsy driving accidents by up to 7 times.   Medications / Alcohol: Even over the counter medications can cause drowsiness. Medications that impair a drivers attention should have a warning label. Alcohol naturally makes you sleepy and on its own can cause accidents. Combined with excessive drowsiness its effects are amplified. Signs of Drowsy Driving:   * You don't remember driving the last few miles   * You may drift out of your wilma   * You are unable to focus and your thoughts wander   * You may yawn more often than normal   * You have difficulty keeping your eyes open / nodding off   * Missing traffic signs, speeding, or tailgating  Prevention-   Good sleep hygiene, lifestyle and behavioral choices have the most impact on drowsy driving. There is no substitute for sleep and the average person requires 8 hours nightly. If you find yourself driving drowsy, stop and sleep. Consider the sleep hygiene tips provided during your visit as well. Medication Refill Policy: Refills for all medications require 1 week advance notice. Please have your pharmacy fax a refill request. We are unable to fax, or call in \"controled substance\" medications and you will need to pick these prescriptions up from our office. [employer] Activation    Thank you for requesting access to [employer]. Please follow the instructions below to securely access and download your online medical record. [employer] allows you to send messages to your doctor, view your test results, renew your prescriptions, schedule appointments, and more. How Do I Sign Up? In your internet browser, go to [email or web address]. Click on the First Time User? Click Here link in the Sign In box. You will see the New Member Sign Up page. Enter your [employer] Access Code exactly as it appears below. You will not need to use this code after youve completed the sign-up process. If you do not sign up before the expiration date, you must request a new code. [employer] Access Code:  Activation code not generated  Current [employer] Status: Active (This is the date your [employer] access code will )    Enter the last four digits of your Social Security Number (xxxx) and Date of Birth (mm/dd/yyyy) as indicated and click Submit. You will be taken to the next sign-up page. Create a [employer] ID. This will be your [employer] login ID and cannot be changed, so think of one that is secure and easy to remember. Create a [employer] password. You can change your password at any time. Enter your Password Reset Question and Answer. This can be used at a later time if you forget your password. Enter your e-mail address. You will receive e-mail notification when new information is available in [address] Click Sign Up. You can now view and download portions of your medical record. Click the App [employer] link to download a portable copy of your medical information. Additional Information    If you have questions, please call [phone]. Remember, [employer] is NOT to be used for urgent needs. For medical emergencies, dial 911.

## 2023-01-25 NOTE — TELEPHONE ENCOUNTER
Called patient to schedule after visit, unable to LVM, faxed DME order to [facility].

## 2023-01-27 ENCOUNTER — VIRTUAL VISIT (OUTPATIENT)
Dept: CARDIOLOGY CLINIC | Age: 73
End: 2023-01-27
Payer: MEDICARE

## 2023-01-27 ENCOUNTER — TELEPHONE (OUTPATIENT)
Dept: CARDIOLOGY CLINIC | Age: 73
End: 2023-01-27

## 2023-01-27 DIAGNOSIS — I48.20 CHRONIC ATRIAL FIBRILLATION (HCC): Primary | ICD-10-CM

## 2023-01-27 DIAGNOSIS — I10 ESSENTIAL HYPERTENSION: ICD-10-CM

## 2023-01-27 DIAGNOSIS — E78.2 MIXED HYPERLIPIDEMIA: ICD-10-CM

## 2023-01-27 DIAGNOSIS — G47.33 OBSTRUCTIVE SLEEP APNEA SYNDROME: ICD-10-CM

## 2023-01-27 DIAGNOSIS — F41.9 ANXIETY: ICD-10-CM

## 2023-01-27 NOTE — PROGRESS NOTES
David Sosa is a 74 y.o. female evaluated via telephone on 1/27/2023. Consent:  She and/or health care decision maker is aware that that she may receive a bill for this telephone service, depending on her insurance coverage, and has provided verbal consent to proceed: Yes    5/14 echo normal lvef, lae  5/14 lexiscan cardiolyte stress neg, lvef 70%  3/20 event monitor, 3 pauses max 3.3 secs, resolved after stopping verapamil, otherwise afib/flutter with controlled rate     Current Outpatient Medications on File Prior to Visit   Medication Sig    benazepriL (LOTENSIN) 10 mg tablet Take one tab by mouth twice daily. May take an extra tab (10 mg) for elevated BP, max 40 mg (4 tabs) daily. cloNIDine HCL (CATAPRES) 0.1 mg tablet TAKE 1 TO 2 TABLETS BY MOUTH AS NEEDED AS DIRECTED FOR ELEVATED BLOOD PRESSURE (SYSTOLIC FOR BLOOD PRESSURE OVER 160). **MAXIMUM OF 2 TABLETS IN A 4 HOUR PERIOD** this is in addition to patch.    lovastatin (MEVACOR) 20 mg tablet TAKE ONE TABLET BY MOUTH ONCE NIGHTLY.    amLODIPine (NORVASC) 5 mg tablet Take 5 mg by mouth daily. May take an extra tab (total 10 mg daily) for elevated BP prior to doctor/dentist appt    cloNIDine (CATAPRES) 0.3 mg/24 hr APPLY 1 PATCH TOPICALLY TO SKIN EVERY 8 DAYS OR AS DIRECTED. BRAND NAME ONLY. rivaroxaban (Xarelto) 20 mg tab tablet TAKE ONE TABLET BY MOUTH DAILY    acetaminophen (TYLENOL PO) Take  by mouth nightly. Extra strength 1 tab before bedtime    pyridoxine, vitamin B6, (VITAMIN B-6) 100 mg tablet Take 100 mg by mouth as needed. cyanocobalamin 1,000 mcg tablet Take 1,000 mcg by mouth as needed. CLINPRO 5000 1.1 % pste     ascorbic acid, vitamin C, (VITAMIN C) 500 mg tablet Take 1,000 mg by mouth nightly. Cholecalciferol, Vitamin D3, 3,000 unit tab Take 3,000 Units by mouth daily. CALCIUM CARBONATE (TUMS PO) Take  by mouth as needed. No current facility-administered medications on file prior to visit. Documentation:      Pt was contacted regarding appointment rescheduling and they requested a telephone appointment. Details of this discussion including any medical advice provided: she continues to have lots of variable blood pressures, especially when she is trying to get ready for her medical appts, and starts to worry about getting there and all that is involved. She is especially worried about getting back to her dentist and opthalmologists. No worrisome cardiac symptoms. Bp remains elevated even after she takes the prn meds prior to appts. Overall she is stable from cardiac standpoint. I dont think she is going to do well until she find a way for transportation or home care.        negative for chest pain, dyspnea, syncope, paroxysmal nocturnal dyspnea    current treatment plan is effective, no change in therapy  lab results and schedule of future lab studies reviewed with patient  reviewed diet, exercise and weight control    I affirm this is a Patient Initiated Episode with an Established Patient who has not had a related appointment within my department in the past 7 days or scheduled within the next 24 hours.     Total Time: minutes: 11-20 minutes    Note: not billable if this call serves to triage the patient into an appointment for the relevant concern      Gemma Lyon MD

## 2023-01-27 NOTE — TELEPHONE ENCOUNTER
Pt had a telephone visit with Dr. Tamiko Ramirez this afternoon. Called pt after visit, scheduled 6 mo follow up telephone visit with Dr. Tamiko Ramirez. Patient verbalized understanding and denied further questions or concerns.      Future Appointments   Date Time Provider Kendrick Laws   7/14/2023  3:40 PM MD EMILI Huddleston AMB

## 2023-02-02 DIAGNOSIS — I10 ESSENTIAL HYPERTENSION: ICD-10-CM

## 2023-02-02 RX ORDER — CLONIDINE HYDROCHLORIDE 0.1 MG/1
TABLET ORAL
Qty: 10 TABLET | Refills: 5 | Status: SHIPPED | OUTPATIENT
Start: 2023-02-02

## 2023-02-02 NOTE — TELEPHONE ENCOUNTER
Requested Prescriptions     Signed Prescriptions Disp Refills    cloNIDine HCL (CATAPRES) 0.1 mg tablet 10 Tablet 5     Sig: TAKE ONE OR TWO TABLETS BY MOUTH AS NEEDED AS DIRECTED FOR ELEVATED BLOOD PRESSURE (SYSTOLIC BLOOD PRESSURE OVER 160).  MAX: 2 TABLETS IN 4 HOUR PERIOD*IN ADDITION TO THE PATCH*     Authorizing Provider: Ama Hargrove     Ordering User: Melani Pastor verbal orders

## 2023-02-20 ENCOUNTER — TELEPHONE (OUTPATIENT)
Dept: FAMILY MEDICINE CLINIC | Age: 73
End: 2023-02-20

## 2023-02-20 NOTE — TELEPHONE ENCOUNTER
[facility] Home Based Primary Care & Supportive Services   Phone:  [phone]      Fax:  [phone] [facility] [address]    Name:  Noah Lange  YOB: 1950      Patient called and left message requesting call back regarding [facility] services. Patient says she needs a face to face visit by a PCP because she needs a new wheelchair. She states that the wheelchair she currently has has large wheels and it is scraping the walls of her apartment. This nurse returned call, no answer, no voicemail option, unable to leave message.     BABAR GossN, RN-BC, [facility]  Referral Navigator, Home Based Primary Care & Supportive Services Cartilage Graft Text: The defect edges were debeveled with a #15 scalpel blade.  Given the location of the defect, shape of the defect, the fact the defect involved a full thickness cartilage defect a cartilage graft was deemed most appropriate.  An appropriate donor site was identified, cleansed, and anesthetized. The cartilage graft was then harvested and transferred to the recipient site, oriented appropriately and then sutured into place.  The secondary defect was then repaired using a primary closure.

## 2023-02-24 ENCOUNTER — TELEPHONE (OUTPATIENT)
Dept: FAMILY MEDICINE CLINIC | Age: 73
End: 2023-02-24

## 2023-02-24 NOTE — TELEPHONE ENCOUNTER
[facility] Home Based Primary Care & Supportive Services   Phone:  [phone]      Fax:  [phone] [address] [place] [address]    Name:  Dejuan Jorgensen  YOB: 1950    Outgoing call to patient to follow up on a message she left on 2/20/23. She was requesting call back regarding [facility] services. Patient says she needs a face to face visit by a PCP because she needs a new wheelchair. She states that the wheelchair she currently has has large wheels and it is scraping the walls of her apartment. This nurse returned call on 2/20/23 and again today, no answer, no voicemail option, unable to leave message.       BABAR FloydN, RN-BC, [facility]  Referral Navigator, Home Based Primary Care & Supportive Services

## 2023-03-06 ENCOUNTER — TELEPHONE (OUTPATIENT)
Dept: FAMILY MEDICINE CLINIC | Age: 73
End: 2023-03-06

## 2023-03-08 ENCOUNTER — TELEPHONE (OUTPATIENT)
Dept: FAMILY MEDICINE CLINIC | Age: 73
End: 2023-03-08

## 2023-03-08 NOTE — TELEPHONE ENCOUNTER
[facility] Home Based Primary Care & Supportive Services   Phone:  [phone]      Fax:  [phone] [place] [address], [address]    Name:  Kd Whiting  YOB: 1950    Patient called and states she needs a face to face visit by a PCP because she needs a new wheelchair. She states that the wheelchair she currently has has large wheels and it is scraping the walls of her apartment. Patient called on 2/20/23 and left a message also stating this on [facility] voicemail. This nurse advised patient that she returned patient's call on 2/20/23, 2/24/23, and 3/6/23. On each of these attempts, there was no answer and no option to leave a voicemail. Patient says she does not answer her phone and relies on caller ID to see when calls come in and she screens her calls. This nurse explained that unfortunately we are unable to accept patients into the [facility] practice if we are unable to get in contact with them. Our providers make home visits and it is important to have a way to contact patient to inform them of upcoming visits, etc.  When patients do not return calls, it impedes their care and our ability to help. The same issue happened in Sept 2022 when Dr. Ramona Loco referred patient to [facility]: this nurse was unable to reach patient due to her not answering her phone and no option to leave a message. There is also no emergency contact that can be called as a backup when unable to reach patient. She says she has a brother who lives out of state but she does not want him contacted. This nurse suggested that patient can try [address] (formerly [employer]) at [phone], or [employer] of [address]. Patient says she will look them up on the internet. Patient says she lives near [facility] and she has tried calling there but was told that it takes at least a year to get a new patient appointment.   This nurse advised patient that if she can get out to that office, to try calling again [phone], the new patient wait is not usually that long. Patient says she lives 2 blocks away and may be able to get to the office there.     Boone Walker, BABARN, RN-BC, [facility]  Referral Navigator, Home Based Primary Care & Supportive Services

## 2023-03-09 ENCOUNTER — TELEPHONE (OUTPATIENT)
Dept: CARDIOLOGY CLINIC | Age: 73
End: 2023-03-09

## 2023-03-09 DIAGNOSIS — I10 ESSENTIAL HYPERTENSION: ICD-10-CM

## 2023-03-09 RX ORDER — CLONIDINE 0.3 MG/24H
PATCH, EXTENDED RELEASE TRANSDERMAL
Qty: 4 PATCH | Refills: 5 | Status: SHIPPED | OUTPATIENT
Start: 2023-03-09

## 2023-03-09 NOTE — TELEPHONE ENCOUNTER
Patient needs a refill on Clonidine 0.3 mg, patient is almost out of medication.     Portia Moreno [facility] 15. [phone]

## 2023-03-09 NOTE — TELEPHONE ENCOUNTER
Requested Prescriptions     Signed Prescriptions Disp Refills    cloNIDine (CATAPRES) 0.3 mg/24 hr 4 Patch 5     Sig: APPLY 1 PATCH TOPICALLY TO SKIN EVERY 8 DAYS OR AS DIRECTED. BRAND NAME ONLY.      Authorizing Provider: Susan Little     Ordering User: Kate Nicole    Per Dr. Onofre Greer verbal orders

## 2023-03-09 NOTE — TELEPHONE ENCOUNTER
Yue Brandon from [address] [place] [address] called to see if the generic can be used for Carapres 0.3 mg.     Phone number [phone]  Fax [phone]

## 2023-03-10 ENCOUNTER — TELEPHONE (OUTPATIENT)
Dept: CARDIOLOGY CLINIC | Age: 73
End: 2023-03-10

## 2023-03-10 NOTE — TELEPHONE ENCOUNTER
The rx we sent had note to pharmacy stating \"must be brand. \" Faxed a note to pharmacy stating this again and to see pharmacy note on rx.

## 2023-03-10 NOTE — TELEPHONE ENCOUNTER
Called Zita. She said patient has been taking generic catapres since fall and is okay with it. She is taking extra clonidine tabs to make up for lack of effectiveness. I told her if ok with patient then continue filling generic. She denied further questions or concerns.

## 2023-07-14 ENCOUNTER — TELEPHONE (OUTPATIENT)
Age: 73
End: 2023-07-14

## 2023-07-14 ENCOUNTER — TELEMEDICINE (OUTPATIENT)
Age: 73
End: 2023-07-14

## 2023-07-14 DIAGNOSIS — I48.20 CHRONIC ATRIAL FIBRILLATION (HCC): Primary | ICD-10-CM

## 2023-07-14 DIAGNOSIS — E78.2 MIXED HYPERLIPIDEMIA: ICD-10-CM

## 2023-07-14 DIAGNOSIS — F41.9 ANXIETY: ICD-10-CM

## 2023-07-14 DIAGNOSIS — G47.33 OBSTRUCTIVE SLEEP APNEA (ADULT) (PEDIATRIC): ICD-10-CM

## 2023-07-14 DIAGNOSIS — E66.01 SEVERE OBESITY (BMI 35.0-39.9) WITH COMORBIDITY (HCC): ICD-10-CM

## 2023-07-14 DIAGNOSIS — I10 ESSENTIAL (PRIMARY) HYPERTENSION: ICD-10-CM

## 2023-07-14 RX ORDER — BRINZOLAMIDE/BRIMONIDINE TARTRATE 10; 2 MG/ML; MG/ML
SUSPENSION/ DROPS OPHTHALMIC
COMMUNITY
Start: 2023-06-24

## 2023-07-14 NOTE — PROGRESS NOTES
Aurelio Khan is a 74 y.o. female evaluated via telephone on 7/14/2023. Consent:  She and/or health care decision maker is aware that that she may receive a bill for this telephone service, depending on her insurance coverage, and has provided verbal consent to proceed: Yes    5/14 echo normal lvef, lae  5/14 lexiscan cardiolyte stress neg, lvef 70%  3/20 event monitor, 3 pauses max 3.3 secs, resolved after stopping verapamil, otherwise afib/flutter with controlled rate           Current Outpatient Medications:     SIMBRINZA 1-0.2 % SUSP, , Disp: , Rfl:     amLODIPine (NORVASC) 5 MG tablet, Take 1 tablet by mouth as needed, Disp: , Rfl:     ascorbic acid (VITAMIN C) 500 MG tablet, Take 2 tablets by mouth, Disp: , Rfl:     benazepril (LOTENSIN) 10 MG tablet, Take one tab by mouth twice daily. May take an extra tab (10 mg) for elevated BP, max 40 mg (4 tabs) daily. , Disp: , Rfl:     Cholecalciferol 75 MCG (3000 UT) TABS, Take 3,000 Units by mouth as needed, Disp: , Rfl:     cloNIDine (CATAPRES) 0.3 MG/24HR PTWK, APPLY 1 PATCH TOPICALLY TO SKIN EVERY 8 DAYS OR AS DIRECTED. BRAND NAME ONLY., Disp: , Rfl:     cloNIDine (CATAPRES) 0.1 MG tablet, TAKE ONE OR TWO TABLETS BY MOUTH AS NEEDED AS DIRECTED FOR ELEVATED BLOOD PRESSURE (SYSTOLIC BLOOD PRESSURE OVER 160). MAX: 2 TABLETS IN 4 HOUR PERIOD*IN ADDITION TO THE PATCH*, Disp: , Rfl:     lovastatin (MEVACOR) 20 MG tablet, Take 1 tablet by mouth nightly, Disp: , Rfl:     pyridoxine (B-6) 100 MG tablet, Take 1 tablet by mouth as needed, Disp: , Rfl:     rivaroxaban (XARELTO) 20 MG TABS tablet, Take 1 tablet by mouth daily, Disp: , Rfl:     Sodium Fluoride (CLINPRO 5000) 1.1 % PSTE, ceived the following from [facility]: Outside name: CLINPRO 5000 1.1 % pste, Disp: , Rfl:      Documentation:  Pt was contacted regarding appointment rescheduling and they requested a telephone appointment.   Details of this discussion including any medical advice provided:

## 2023-07-14 NOTE — TELEPHONE ENCOUNTER
Pt is calling because she said she receive a call from our office. I don't see any notes please assist.    [phone]

## 2023-07-31 ENCOUNTER — HOSPITAL ENCOUNTER (EMERGENCY)
Facility: HOSPITAL | Age: 73
Discharge: HOME | End: 2023-07-31
Attending: EMERGENCY MEDICINE
Payer: MEDICARE

## 2023-07-31 VITALS
OXYGEN SATURATION: 94 % | TEMPERATURE: 98.4 F | DIASTOLIC BLOOD PRESSURE: 82 MMHG | HEART RATE: 69 BPM | SYSTOLIC BLOOD PRESSURE: 126 MMHG | RESPIRATION RATE: 16 BRPM

## 2023-07-31 DIAGNOSIS — Z71.1 PERSON WITH FEARED HEALTH COMPLAINT IN WHOM NO DIAGNOSIS IS MADE: Primary | ICD-10-CM

## 2023-07-31 PROCEDURE — 99283 EMERGENCY DEPT VISIT LOW MDM: CPT

## 2023-07-31 ASSESSMENT — ENCOUNTER SYMPTOMS
BACK PAIN: 0
ABDOMINAL PAIN: 0
FACIAL SWELLING: 1
SHORTNESS OF BREATH: 0
VOMITING: 0
NAUSEA: 0
COUGH: 0

## 2023-07-31 ASSESSMENT — PAIN - FUNCTIONAL ASSESSMENT: PAIN_FUNCTIONAL_ASSESSMENT: NONE - DENIES PAIN

## 2023-07-31 NOTE — ED PROVIDER NOTES
breast biopsy    GYN      endometrial ablation x 2    OTHER SURGICAL HISTORY      colonoscopy x 1    [place] UNLISTED PROCEDURE CARDIAC SURGERY      cardiac cath normal per pt, [facility] doctors approx 1994    TONSILLECTOMY      T & A         CURRENT MEDICATIONS       Previous Medications    AMLODIPINE (NORVASC) 5 MG TABLET    Take 1 tablet by mouth as needed    ASCORBIC ACID (VITAMIN C) 500 MG TABLET    Take 2 tablets by mouth    BENAZEPRIL (LOTENSIN) 10 MG TABLET    Take one tab by mouth twice daily. May take an extra tab (10 mg) for elevated BP, max 40 mg (4 tabs) daily. CHOLECALCIFEROL 75 MCG (3000 UT) TABS    Take 3,000 Units by mouth as needed    CLONIDINE (CATAPRES) 0.1 MG TABLET    TAKE ONE OR TWO TABLETS BY MOUTH AS NEEDED AS DIRECTED FOR ELEVATED BLOOD PRESSURE (SYSTOLIC BLOOD PRESSURE OVER 160). MAX: 2 TABLETS IN 4 HOUR PERIOD*IN ADDITION TO THE PATCH*    CLONIDINE (CATAPRES) 0.3 MG/24HR PTWK    APPLY 1 PATCH TOPICALLY TO SKIN EVERY 8 DAYS OR AS DIRECTED. BRAND NAME ONLY.     LOVASTATIN (MEVACOR) 20 MG TABLET    Take 1 tablet by mouth nightly    PYRIDOXINE (B-6) 100 MG TABLET    Take 1 tablet by mouth as needed    RIVAROXABAN (XARELTO) 20 MG TABS TABLET    Take 1 tablet by mouth daily    SIMBRINZA 1-0.2 % SUSP        SODIUM FLUORIDE (CLINPRO 5000) 1.1 % PSTE    ceived the following from [facility]: Outside name: CLINPRO 5000 1.1 % pste       ALLERGIES     Latex, Atenolol, Atenolol-chlorthalidone, Cefaclor, Cyclobenzaprine, Diltiazem hcl, Felodipine, Green tea leaf ext, Hydroxyzine pamoate, Levofloxacin, Lisinopril, Maprotiline, Medroxyprogesterone, Montelukast, Nifedipine, Nitrofurantoin, Shellfish allergy, and Simvastatin    FAMILY HISTORY       Family History   Problem Relation Age of Onset    Psychiatric Disorder Father     Lung Disease Father     Heart Disease Father     Other Sister         arthritis    Glaucoma Mother     Lung Disease Mother     Diabetes Mother     Asthma Mother

## 2023-07-31 NOTE — ED TRIAGE NOTES
Patient arrives to ED from [address] [place] [address] living for left eye swelling, patient denies trauma or drainage to eye.

## 2023-08-04 ENCOUNTER — TELEPHONE (OUTPATIENT)
Age: 73
End: 2023-08-04

## 2023-08-09 ENCOUNTER — TELEPHONE (OUTPATIENT)
Age: 73
End: 2023-08-09

## 2023-08-09 NOTE — TELEPHONE ENCOUNTER
[facility] Home Based Primary Care & Supportive Services   Phone:  [phone]      Fax:  [phone] [place] [address], [address]    Name:  Tammy Lebron  YOB: 1950    Patient left message on [facility] voicemail stating she needs to see a PCP so she can have a face-to-face for a new wheelchair. She states her current wheelchair is \"falling apart\". See this nurse's note from 2/20/23. She is inquiring about [facility] services. This nurse returned call, no answer, no option to leave a voicemail.   (This nurse also called patient on 2/20/23 and 2/24/23, no answer no option to leave voicemail.)      Sher Ko, BABARN, RN-BC, [facility]  Referral Navigator, Home Based [address] Topical Clindamycin Counseling: Patient counseled that this medication may cause skin irritation or allergic reactions.  In the event of skin irritation, the patient was advised to reduce the amount of the drug applied or use it less frequently.   The patient verbalized understanding of the proper use and possible adverse effects of clindamycin.  All of the patient's questions and concerns were addressed.

## 2023-08-09 NOTE — TELEPHONE ENCOUNTER
Patient called about malfunction with device, not working all the time. I told her to contact her medical device company per technician.

## 2023-08-11 ENCOUNTER — TELEPHONE (OUTPATIENT)
Age: 73
End: 2023-08-11

## 2023-08-11 NOTE — TELEPHONE ENCOUNTER
Called pt. Verified patient's identity with two identifiers. Pt got a letter with January VV date/time. She asked to r/s to a Monday or Friday and hopes to change appt to an in office visit closer to time if possible. R/s'ed appt to a Monday afternoon, but left as a VV. Pt also discussed concerns about Dr. Dominick Akins retiring one day, whether she would be referred to a doctor I would work, etc. Berna Berry pt Dr. Dominick Akins and the practice will announce to his patients once he decides so we will cross that bridge when we get to it. Pt also asked if Dr. Dominick Akins could write a short note stating due to her heart condition the exterminators treating her apartments should use gel rather than spray when treating her home if possible. I said I would send message and we would mail to her if okay. Patient verbalized understanding and denied further questions or concerns.

## 2023-08-13 DIAGNOSIS — E78.2 MIXED HYPERLIPIDEMIA: Primary | ICD-10-CM

## 2023-08-14 ENCOUNTER — TELEPHONE (OUTPATIENT)
Age: 73
End: 2023-08-14

## 2023-08-14 RX ORDER — LOVASTATIN 20 MG/1
TABLET ORAL
Qty: 90 TABLET | Refills: 1 | Status: SHIPPED | OUTPATIENT
Start: 2023-08-14

## 2023-08-14 NOTE — TELEPHONE ENCOUNTER
Requested Prescriptions     Signed Prescriptions Disp Refills    lovastatin (MEVACOR) 20 MG tablet 90 tablet 1     Sig: TAKE ONE TABLET BY MOUTH ONCE NIGHTLY     Authorizing Provider: Arabella So     Ordering User: Sky Alfaro    Per Dr. Brittany Moody verbal order.

## 2023-08-16 ENCOUNTER — TELEPHONE (OUTPATIENT)
Age: 73
End: 2023-08-16

## 2023-08-16 NOTE — TELEPHONE ENCOUNTER
Pt called and would like to speak to nurse, stated she need a note saying she have a heart problem. .please advise    [phone]

## 2023-08-16 NOTE — TELEPHONE ENCOUNTER
Called pt. Notified her Dr. Galina Centeno said okay to write letter. Mailing pt letter. Patient verbalized understanding and denied further questions or concerns.

## 2023-09-03 DIAGNOSIS — I10 ESSENTIAL (PRIMARY) HYPERTENSION: Primary | ICD-10-CM

## 2023-09-05 RX ORDER — CLONIDINE 0.3 MG/24H
PATCH, EXTENDED RELEASE TRANSDERMAL
Qty: 4 PATCH | Refills: 5 | Status: SHIPPED | OUTPATIENT
Start: 2023-09-05

## 2023-09-05 NOTE — TELEPHONE ENCOUNTER
Requested Prescriptions     Signed Prescriptions Disp Refills    cloNIDine (CATAPRES) 0.3 MG/24HR PTWK 4 patch 5     Sig: APPLY 1 PATCH TOPICALLY TO SKIN EVERY 8 DAYS OR AS DIRECTED. Authorizing Provider: Mela Xiong     Ordering User: Vivien Barker    Per Dr. Kaylyn Lopez verbal order.

## 2023-10-01 DIAGNOSIS — I48.20 CHRONIC ATRIAL FIBRILLATION (HCC): Primary | ICD-10-CM

## 2023-10-02 RX ORDER — RIVAROXABAN 20 MG/1
20 TABLET, FILM COATED ORAL DAILY
Qty: 30 TABLET | Refills: 5 | Status: SHIPPED | OUTPATIENT
Start: 2023-10-02

## 2023-10-02 NOTE — TELEPHONE ENCOUNTER
Requested Prescriptions     Signed Prescriptions Disp Refills    rivaroxaban (XARELTO) 20 MG TABS tablet 30 tablet 5     Sig: TAKE ONE TABLET BY MOUTH DAILY     Authorizing Provider: Flor Heading     Ordering User: Sandie Osorio   Per Dr. Mili Reese verbal order.

## 2023-11-29 DIAGNOSIS — I10 ESSENTIAL (PRIMARY) HYPERTENSION: Primary | ICD-10-CM

## 2023-11-29 RX ORDER — AMLODIPINE BESYLATE 5 MG/1
TABLET ORAL
Qty: 90 TABLET | Refills: 1 | Status: SHIPPED | OUTPATIENT
Start: 2023-11-29

## 2023-11-29 NOTE — TELEPHONE ENCOUNTER
Requested Prescriptions     Signed Prescriptions Disp Refills    amLODIPine (NORVASC) 5 MG tablet 90 tablet 1     Sig: TAKE ONE TABLET BY MOUTH DAILY AS NEEDED FOR ELEVATED BLOOD PRESSURE     Authorizing Provider: Cristi Bojorquez     Ordering User: Anthony Canales    Per Dr. Lowell Christie verbal order.
Statement Selected

## 2023-12-07 ENCOUNTER — TELEPHONE (OUTPATIENT)
Age: 73
End: 2023-12-07

## 2023-12-07 DIAGNOSIS — E78.2 MIXED HYPERLIPIDEMIA: ICD-10-CM

## 2023-12-07 RX ORDER — LOVASTATIN 20 MG/1
20 TABLET ORAL NIGHTLY
Qty: 90 TABLET | Refills: 3 | Status: SHIPPED | OUTPATIENT
Start: 2023-12-07

## 2023-12-07 NOTE — TELEPHONE ENCOUNTER
Requested Prescriptions     Signed Prescriptions Disp Refills    lovastatin (MEVACOR) 20 MG tablet 90 tablet 3     Sig: Take 1 tablet by mouth nightly     Authorizing Provider: Catie Zuniga     Ordering User: Dot Timmons    Per Dr. Eboni Huddleston verbal order.

## 2023-12-07 NOTE — TELEPHONE ENCOUNTER
Patient called requesting refill for medication Lovastatin 20mg send to [facility] [phone]  Patient stated she will be out of her medication by Monday needs to have someone  her medication on tomorrow 12/08    Patient# [phone]

## 2023-12-14 ENCOUNTER — TELEPHONE (OUTPATIENT)
Age: 73
End: 2023-12-14

## 2023-12-14 DIAGNOSIS — E78.2 MIXED HYPERLIPIDEMIA: ICD-10-CM

## 2023-12-14 DIAGNOSIS — I48.20 CHRONIC ATRIAL FIBRILLATION (HCC): ICD-10-CM

## 2023-12-14 DIAGNOSIS — I10 ESSENTIAL (PRIMARY) HYPERTENSION: ICD-10-CM

## 2023-12-14 RX ORDER — LOVASTATIN 20 MG/1
20 TABLET ORAL NIGHTLY
Qty: 90 TABLET | Refills: 1 | Status: SHIPPED | OUTPATIENT
Start: 2023-12-14

## 2023-12-14 RX ORDER — CLONIDINE HYDROCHLORIDE 0.1 MG/1
TABLET ORAL
Qty: 180 TABLET | Refills: 1 | Status: SHIPPED | OUTPATIENT
Start: 2023-12-14

## 2023-12-14 RX ORDER — AMLODIPINE BESYLATE 5 MG/1
TABLET ORAL
Qty: 90 TABLET | Refills: 1 | Status: SHIPPED | OUTPATIENT
Start: 2023-12-14

## 2023-12-14 RX ORDER — CLONIDINE 0.3 MG/24H
PATCH, EXTENDED RELEASE TRANSDERMAL
Qty: 12 PATCH | Refills: 1 | Status: SHIPPED | OUTPATIENT
Start: 2023-12-14

## 2023-12-14 NOTE — TELEPHONE ENCOUNTER
Requested Prescriptions     Signed Prescriptions Disp Refills    cloNIDine (CATAPRES) 0.3 MG/24HR PTWK 12 patch 1     Sig: APPLY 1 PATCH TOPICALLY TO SKIN EVERY 8 DAYS OR AS DIRECTED. Authorizing Provider: Clemencia Mahan     Ordering User: Tyrese Gentile    cloNIDine (CATAPRES) 0.1 MG tablet 180 tablet 1     Sig: TAKE ONE OR TWO TABLETS BY MOUTH AS NEEDED AS DIRECTED FOR ELEVATED BLOOD PRESSURE (SYSTOLIC BLOOD PRESSURE OVER 160). MAX: 2 TABLETS IN 4 HOUR PERIOD*IN ADDITION TO THE PATCH*     Authorizing Provider: Clemencia Mahan     Ordering User: Tyrese Gentile    lovastatin (MEVACOR) 20 MG tablet 90 tablet 1     Sig: Take 1 tablet by mouth nightly     Authorizing Provider: Clemencia Mahan     Ordering User: Tyrese Gentile    rivaroxaban (XARELTO) 20 MG TABS tablet 90 tablet 1     Sig: Take 1 tablet by mouth daily     Authorizing Provider: Clemencia Mahan     Ordering User: DAVID Mckinnon    amLODIPine (NORVASC) 5 MG tablet 90 tablet 1     Sig: TAKE ONE TABLET BY MOUTH DAILY AS NEEDED FOR ELEVATED BLOOD PRESSURE     Authorizing Provider: Clemencia Mahan     Ordering User: Tyrese Gentile    Per Dr. Florentin Dos Santos verbal order.

## 2023-12-14 NOTE — TELEPHONE ENCOUNTER
Pt called in reference to medication refill for CLONIDINE. Pt would like to know if she can get a three month supply because she is almost out of medication. Also, starting 01/01/2024 her medicare will not pay as much and her medication is going to cost more money and pt would like to know if she could get a three month supply on all her meds. Pt would like refill sent to [facility].      [facility] ph # [phone]    Pt ph # [phone]

## 2024-01-01 NOTE — TELEPHONE ENCOUNTER
Attempted to call pt - no answer. Not able to leave message. Infant 

## 2024-01-15 ENCOUNTER — TELEPHONE (OUTPATIENT)
Age: 74
End: 2024-01-15

## 2024-01-15 ENCOUNTER — TELEMEDICINE (OUTPATIENT)
Age: 74
End: 2024-01-15

## 2024-01-15 DIAGNOSIS — G47.33 OBSTRUCTIVE SLEEP APNEA (ADULT) (PEDIATRIC): ICD-10-CM

## 2024-01-15 DIAGNOSIS — I10 ESSENTIAL (PRIMARY) HYPERTENSION: ICD-10-CM

## 2024-01-15 DIAGNOSIS — I48.20 CHRONIC ATRIAL FIBRILLATION (HCC): Primary | ICD-10-CM

## 2024-01-15 NOTE — TELEPHONE ENCOUNTER
----- Message from Peter Michael III, MD sent at 1/15/2024  3:42 PM EST -----  6mo virtual visit. No meds right now

## 2024-01-15 NOTE — PROGRESS NOTES
Patient states her blood pressure has been higher then normal  
about her living situation and her health and her inability to make it out for various appointments.  Apparently there is some people in her building are planning their TV and music in the melanite so she is not sleeping well and as a consequence her blood pressure has been higher than normal for her.  She is complaining of the manager and now these people have put in a complaint to hide to investigate her for racism.  The cost of her medications are gone up and she is worried they may try to kick her out of the building.  Her brother did fund transportation for her but every time she gets ready to go to an appointment she ends up Canceling it because she worries about her blood pressure and headaches and so forth.  Fortunately she is not really having any sort of cardiac complaints and she sounds very alert though as usual anxious and fretful    From a cardiac standpoint I think she is quite stable.  I told her we could always increase her daily dose of amlodipine which is inexpensive for her so she would not have to use as much of the more expensive benazepril and she is going to think about that.  I encouraged her to keep working with [facility] to help her with her transportation and other needs.  Hopefully eventually she will be able to figure out a way to come into the office.  She is also worried and I am going to retire.        negative for chest pain, dyspnea, irregular heart beat, and lower extremity edema    current treatment plan is effective, no change in therapy    I affirm this is a Patient Initiated Episode with an Established Patient who has not had a related appointment within my department in the past 7 days or scheduled within the next 24 hours.    Total Time: minutes: 11-20 minutes    Note: not billable if this call serves to triage the patient into an appointment for the relevant concern      Peter Michael MD

## 2024-01-16 NOTE — TELEPHONE ENCOUNTER
Called patient to schedule 6 month follow up VV/phone visit. Patient does not have a voicemail box set up so unable to leave message.     Tentatively scheduled appointment for another Monday afternoon like she had yesterday. Mailed patient appointment with note stating she can call office to r/s if this does not work for her.     Future Appointments   Date Time Provider Department Center   7/1/2024  3:40 PM Peter Michael III, MD [facility]

## 2024-05-17 ENCOUNTER — TELEPHONE (OUTPATIENT)
Age: 74
End: 2024-05-17

## 2024-05-17 NOTE — TELEPHONE ENCOUNTER
Patient called in regards to some symptoms she is experiencing. Patient is having swelling in her right foot and ankle, patient also stated some discoloration on the area as well. Patient also is having trouble making it to her medical appointments. Patient expressed wanting to speak to the nurse regarding these concerns as soon as possible.        Patient Phone# [phone]

## 2024-05-17 NOTE — TELEPHONE ENCOUNTER
Called pt. Verified patient's identity with two identifiers. Pt r/s'ed phone call visit from a Monday to Tuesday. Pt stated her ankles are a little swollen, one side a little redder than the other, but denied pain in either LE. She does not walk around much, 20 steps a day at most. She sits all day reading or on her computer with feet down. Advised she elevated legs. Pt said she can't because there is nowhere to put them when reading or on the computer. I explained she can get another chair to prop them up, stool, etc. Pt agreed to try. I told her if this does not help she can let us know and Dr. Michael may advise she try switching one of her meds that could be cause, but that would be last resort since she is doing well of meds. Pt said she does not want to change meds. Pt voiced concern about Dr. Michael retiring one day and how she has still been unable to get a pcp or any doctor because she cannot make it to appointments. She said now it is difficulty because she cannot sleep due to loud people that live below her. She has no showed too many appts same day as appt that now several doctors' office will not r/s her/ Urged her to try to get past being tired and make it to at least an appt with a pcp to establish care. Reminded pt to elevate legs and let us know if it tovar not improve. Patient verbalized understanding and denied further questions or concerns.

## 2024-06-02 ENCOUNTER — HOSPITAL ENCOUNTER (EMERGENCY)
Facility: HOSPITAL | Age: 74
Discharge: HOME | End: 2024-06-02
Attending: STUDENT IN AN ORGANIZED HEALTH CARE EDUCATION/TRAINING PROGRAM
Payer: MEDICARE

## 2024-06-02 VITALS
RESPIRATION RATE: 14 BRPM | SYSTOLIC BLOOD PRESSURE: 171 MMHG | BODY MASS INDEX: 35.13 KG/M2 | HEIGHT: 62 IN | HEART RATE: 67 BPM | TEMPERATURE: 99.2 F | WEIGHT: 190.92 LBS | DIASTOLIC BLOOD PRESSURE: 88 MMHG | OXYGEN SATURATION: 92 %

## 2024-06-02 DIAGNOSIS — K02.9 DENTAL CAVITY: Primary | ICD-10-CM

## 2024-06-02 PROCEDURE — 99284 EMERGENCY DEPT VISIT MOD MDM: CPT

## 2024-06-02 PROCEDURE — 6370000000 HC RX 637 (ALT 250 FOR IP): Performed by: STUDENT IN AN ORGANIZED HEALTH CARE EDUCATION/TRAINING PROGRAM

## 2024-06-02 PROCEDURE — 93005 ELECTROCARDIOGRAM TRACING: CPT | Performed by: STUDENT IN AN ORGANIZED HEALTH CARE EDUCATION/TRAINING PROGRAM

## 2024-06-02 RX ADMIN — DIPHENHYDRAMINE HCL ORAL: 12.5 SOLUTION ORAL at 17:30

## 2024-06-02 ASSESSMENT — PAIN - FUNCTIONAL ASSESSMENT
PAIN_FUNCTIONAL_ASSESSMENT: 0-10
PAIN_FUNCTIONAL_ASSESSMENT: PREVENTS OR INTERFERES SOME ACTIVE ACTIVITIES AND ADLS

## 2024-06-02 ASSESSMENT — PAIN DESCRIPTION - DESCRIPTORS: DESCRIPTORS: ACHING

## 2024-06-02 ASSESSMENT — PAIN SCALES - GENERAL: PAINLEVEL_OUTOF10: 10

## 2024-06-02 ASSESSMENT — PAIN DESCRIPTION - ORIENTATION: ORIENTATION: LEFT

## 2024-06-02 ASSESSMENT — PAIN DESCRIPTION - LOCATION: LOCATION: TEETH

## 2024-06-02 NOTE — DISCHARGE INSTRUCTIONS
Take the prescribed numbing oral gel for dental pain.  Return to the emergency department for any changing or worsening symptoms.  Otherwise follow-up with a dentist or oral surgeon.  The following is a list of walk-in dental clinics:  Emergency Dental Care     [facility]  [address] [place], [place] [address]  Open M, W, F: 8AM - 5PM and T, Th: 8AM-6PM  Phone: [phone], press 4  $70 for Emergency Care  $60 for first routine care, then pay by sliding scale based upon income.    [facility]'s [address] [address]  Phone: [phone]    [facility]  [address] [place] [address]  Open Monday - Friday 8AM - 4:30 PM  Phone: [phone]    [facility]  [facility], [facility], [address] [place] [address], 1st Floor  First Come First Service starting at 8:30 AM M-F  Phone: [phone], press 2  Fee: $150 per tooth (x-ray & extractions only)  Pediatrics Phone:: [phone], 8-5 M-F    [facility]  [facility], [facility], [address] [place] [address], 2nd Floor, [address]  First Come First Service starting at 8:30 AM - 3 PM M - F    [facility]  [address] [place] [address]  Phone: [phone] or [phone]  Emergency Hours: 9:30AM - 11AM (extractions)  Simple tooth extraction $ per tooth. #75 for x-ray    [facility] Residents only, over the age of 18  Phone: [phone]. Leave message saying you need an appointment to register.  Hours: Tuesday Evenings

## 2024-06-02 NOTE — ED PROVIDER NOTES
DISPOSITION/PLAN   DISPOSITION Discharge - Pending Orders Complete 06/02/2024 05:15:59 PM    (Please note that portions of this note were completed with a voice recognition program.  Efforts were made to edit the dictations but occasionally words are mis-transcribed.)    Richar Patterson MD (electronically signed)  Emergency Attending Physician      Richar Patterson MD  06/02/24 1512

## 2024-06-02 NOTE — ED TRIAGE NOTES
Patient arrives to ED reports hypertension despite compliance with her medications.  Patient reports her pain is due to needing her tooth extracted, states she does have a dentist but doesn't feel up to going that far.  Patient requesting a dentist to be called in for tooth extraction \"Don't you know that pain from a tooth needing extracted can make you have a stroke?\"

## 2024-06-03 LAB
EKG DIAGNOSIS: NORMAL
EKG Q-T INTERVAL: 398 MS
EKG QRS DURATION: 80 MS
EKG QTC CALCULATION (BAZETT): 413 MS
EKG R AXIS: 7 DEGREES
EKG T AXIS: 5 DEGREES
EKG VENTRICULAR RATE: 65 BPM

## 2024-06-18 ENCOUNTER — HOSPITAL ENCOUNTER (INPATIENT)
Facility: HOSPITAL | Age: 74
LOS: 5 days | Discharge: NURSING, REHAB OR LONG-TERM CARE | DRG: 149 | End: 2024-06-23
Attending: EMERGENCY MEDICINE | Admitting: HOSPITALIST
Payer: MEDICARE

## 2024-06-18 ENCOUNTER — APPOINTMENT (OUTPATIENT)
Facility: HOSPITAL | Age: 74
DRG: 149 | End: 2024-06-18
Payer: MEDICARE

## 2024-06-18 ENCOUNTER — TELEPHONE (OUTPATIENT)
Age: 74
End: 2024-06-18

## 2024-06-18 DIAGNOSIS — E78.2 MIXED HYPERLIPIDEMIA: ICD-10-CM

## 2024-06-18 DIAGNOSIS — R26.81 UNSTEADY: ICD-10-CM

## 2024-06-18 DIAGNOSIS — I48.20 CHRONIC ATRIAL FIBRILLATION (HCC): ICD-10-CM

## 2024-06-18 DIAGNOSIS — I10 ESSENTIAL (PRIMARY) HYPERTENSION: ICD-10-CM

## 2024-06-18 DIAGNOSIS — R42 DIZZINESS: Primary | ICD-10-CM

## 2024-06-18 LAB
ALBUMIN SERPL-MCNC: 3.4 G/DL (ref 3.5–5)
ALBUMIN/GLOB SERPL: 0.8 (ref 1.1–2.2)
ALP SERPL-CCNC: 54 U/L (ref 45–117)
ALT SERPL-CCNC: 24 U/L (ref 12–78)
ANION GAP SERPL CALC-SCNC: 5 MMOL/L (ref 5–15)
AST SERPL-CCNC: 17 U/L (ref 15–37)
BASOPHILS # BLD: 0 K/UL (ref 0–0.1)
BASOPHILS NFR BLD: 1 % (ref 0–1)
BILIRUB SERPL-MCNC: 0.6 MG/DL (ref 0.2–1)
BUN SERPL-MCNC: 19 MG/DL (ref 6–20)
BUN/CREAT SERPL: 24 (ref 12–20)
CALCIUM SERPL-MCNC: 9.5 MG/DL (ref 8.5–10.1)
CHLORIDE SERPL-SCNC: 110 MMOL/L (ref 97–108)
CO2 SERPL-SCNC: 28 MMOL/L (ref 21–32)
COMMENT:: NORMAL
CREAT SERPL-MCNC: 0.79 MG/DL (ref 0.55–1.02)
DIFFERENTIAL METHOD BLD: ABNORMAL
EKG DIAGNOSIS: NORMAL
EKG Q-T INTERVAL: 416 MS
EKG QRS DURATION: 84 MS
EKG QTC CALCULATION (BAZETT): 411 MS
EKG R AXIS: 18 DEGREES
EKG T AXIS: 14 DEGREES
EKG VENTRICULAR RATE: 59 BPM
EOSINOPHIL # BLD: 0.1 K/UL (ref 0–0.4)
EOSINOPHIL NFR BLD: 1 % (ref 0–7)
ERYTHROCYTE [DISTWIDTH] IN BLOOD BY AUTOMATED COUNT: 12.7 % (ref 11.5–14.5)
GLOBULIN SER CALC-MCNC: 4.1 G/DL (ref 2–4)
GLUCOSE SERPL-MCNC: 127 MG/DL (ref 65–100)
HCT VFR BLD AUTO: 45.8 % (ref 35–47)
HGB BLD-MCNC: 15.5 G/DL (ref 11.5–16)
IMM GRANULOCYTES # BLD AUTO: 0 K/UL (ref 0–0.04)
IMM GRANULOCYTES NFR BLD AUTO: 0 % (ref 0–0.5)
LYMPHOCYTES # BLD: 1 K/UL (ref 0.8–3.5)
LYMPHOCYTES NFR BLD: 13 % (ref 12–49)
MCH RBC QN AUTO: 31.8 PG (ref 26–34)
MCHC RBC AUTO-ENTMCNC: 33.8 G/DL (ref 30–36.5)
MCV RBC AUTO: 93.9 FL (ref 80–99)
MONOCYTES # BLD: 0.5 K/UL (ref 0–1)
MONOCYTES NFR BLD: 6 % (ref 5–13)
NEUTS SEG # BLD: 6.3 K/UL (ref 1.8–8)
NEUTS SEG NFR BLD: 79 % (ref 32–75)
NRBC # BLD: 0 K/UL (ref 0–0.01)
NRBC BLD-RTO: 0 PER 100 WBC
PLATELET # BLD AUTO: 235 K/UL (ref 150–400)
PMV BLD AUTO: 10.3 FL (ref 8.9–12.9)
POTASSIUM SERPL-SCNC: 3.7 MMOL/L (ref 3.5–5.1)
PROT SERPL-MCNC: 7.5 G/DL (ref 6.4–8.2)
RBC # BLD AUTO: 4.88 M/UL (ref 3.8–5.2)
SODIUM SERPL-SCNC: 143 MMOL/L (ref 136–145)
SPECIMEN HOLD: NORMAL
TROPONIN I SERPL HS-MCNC: 27 NG/L (ref 0–51)
WBC # BLD AUTO: 8 K/UL (ref 3.6–11)

## 2024-06-18 PROCEDURE — 80053 COMPREHEN METABOLIC PANEL: CPT

## 2024-06-18 PROCEDURE — 85025 COMPLETE CBC W/AUTO DIFF WBC: CPT

## 2024-06-18 PROCEDURE — 5A09357 ASSISTANCE WITH RESPIRATORY VENTILATION, LESS THAN 24 CONSECUTIVE HOURS, CONTINUOUS POSITIVE AIRWAY PRESSURE: ICD-10-PCS | Performed by: HOSPITALIST

## 2024-06-18 PROCEDURE — 6370000000 HC RX 637 (ALT 250 FOR IP): Performed by: HOSPITALIST

## 2024-06-18 PROCEDURE — 93005 ELECTROCARDIOGRAM TRACING: CPT | Performed by: EMERGENCY MEDICINE

## 2024-06-18 PROCEDURE — 2060000000 HC ICU INTERMEDIATE R&B

## 2024-06-18 PROCEDURE — 99285 EMERGENCY DEPT VISIT HI MDM: CPT

## 2024-06-18 PROCEDURE — 36415 COLL VENOUS BLD VENIPUNCTURE: CPT

## 2024-06-18 PROCEDURE — 70450 CT HEAD/BRAIN W/O DYE: CPT

## 2024-06-18 PROCEDURE — 2580000003 HC RX 258: Performed by: HOSPITALIST

## 2024-06-18 PROCEDURE — 94660 CPAP INITIATION&MGMT: CPT

## 2024-06-18 PROCEDURE — 84484 ASSAY OF TROPONIN QUANT: CPT

## 2024-06-18 PROCEDURE — 6370000000 HC RX 637 (ALT 250 FOR IP): Performed by: NURSE PRACTITIONER

## 2024-06-18 RX ORDER — ACETAMINOPHEN 325 MG/1
650 TABLET ORAL EVERY 4 HOURS PRN
Status: DISCONTINUED | OUTPATIENT
Start: 2024-06-18 | End: 2024-06-23 | Stop reason: HOSPADM

## 2024-06-18 RX ORDER — SODIUM CHLORIDE 9 MG/ML
INJECTION, SOLUTION INTRAVENOUS CONTINUOUS
Status: DISCONTINUED | OUTPATIENT
Start: 2024-06-18 | End: 2024-06-21

## 2024-06-18 RX ORDER — CALCIUM CARBONATE 500 MG/1
2 TABLET, CHEWABLE ORAL DAILY
Status: ON HOLD | COMMUNITY
Start: 2024-06-18 | End: 2024-06-23

## 2024-06-18 RX ORDER — SODIUM CHLORIDE 0.9 % (FLUSH) 0.9 %
5-40 SYRINGE (ML) INJECTION PRN
Status: DISCONTINUED | OUTPATIENT
Start: 2024-06-18 | End: 2024-06-23 | Stop reason: HOSPADM

## 2024-06-18 RX ORDER — ONDANSETRON 4 MG/1
4 TABLET, ORALLY DISINTEGRATING ORAL EVERY 8 HOURS PRN
Status: DISCONTINUED | OUTPATIENT
Start: 2024-06-18 | End: 2024-06-23 | Stop reason: HOSPADM

## 2024-06-18 RX ORDER — HYDRALAZINE HYDROCHLORIDE 20 MG/ML
10 INJECTION INTRAMUSCULAR; INTRAVENOUS EVERY 6 HOURS PRN
Status: DISCONTINUED | OUTPATIENT
Start: 2024-06-18 | End: 2024-06-23 | Stop reason: HOSPADM

## 2024-06-18 RX ORDER — CLONIDINE HYDROCHLORIDE 0.1 MG/1
0.1 TABLET ORAL EVERY 6 HOURS PRN
Status: DISCONTINUED | OUTPATIENT
Start: 2024-06-18 | End: 2024-06-23 | Stop reason: HOSPADM

## 2024-06-18 RX ORDER — SODIUM CHLORIDE 0.9 % (FLUSH) 0.9 %
5-40 SYRINGE (ML) INJECTION EVERY 12 HOURS SCHEDULED
Status: DISCONTINUED | OUTPATIENT
Start: 2024-06-18 | End: 2024-06-23 | Stop reason: HOSPADM

## 2024-06-18 RX ORDER — SODIUM CHLORIDE 9 MG/ML
INJECTION, SOLUTION INTRAVENOUS PRN
Status: DISCONTINUED | OUTPATIENT
Start: 2024-06-18 | End: 2024-06-23 | Stop reason: HOSPADM

## 2024-06-18 RX ORDER — POLYETHYLENE GLYCOL 3350 17 G/17G
17 POWDER, FOR SOLUTION ORAL DAILY PRN
Status: DISCONTINUED | OUTPATIENT
Start: 2024-06-18 | End: 2024-06-23 | Stop reason: HOSPADM

## 2024-06-18 RX ORDER — CLONIDINE HYDROCHLORIDE 0.1 MG/1
0.1 TABLET ORAL PRN
Status: DISCONTINUED | OUTPATIENT
Start: 2024-06-18 | End: 2024-06-18

## 2024-06-18 RX ORDER — ONDANSETRON 2 MG/ML
4 INJECTION INTRAMUSCULAR; INTRAVENOUS EVERY 6 HOURS PRN
Status: DISCONTINUED | OUTPATIENT
Start: 2024-06-18 | End: 2024-06-23 | Stop reason: HOSPADM

## 2024-06-18 RX ORDER — ENOXAPARIN SODIUM 100 MG/ML
40 INJECTION SUBCUTANEOUS DAILY
Status: DISCONTINUED | OUTPATIENT
Start: 2024-06-18 | End: 2024-06-18

## 2024-06-18 RX ADMIN — ACETAMINOPHEN 650 MG: 325 TABLET ORAL at 22:15

## 2024-06-18 RX ADMIN — SODIUM CHLORIDE: 9 INJECTION, SOLUTION INTRAVENOUS at 19:19

## 2024-06-18 RX ADMIN — RIVAROXABAN 20 MG: 20 TABLET, FILM COATED ORAL at 19:12

## 2024-06-18 RX ADMIN — SODIUM CHLORIDE, PRESERVATIVE FREE 10 ML: 5 INJECTION INTRAVENOUS at 20:48

## 2024-06-18 ASSESSMENT — PAIN - FUNCTIONAL ASSESSMENT
PAIN_FUNCTIONAL_ASSESSMENT: 0-10
PAIN_FUNCTIONAL_ASSESSMENT: 0-10

## 2024-06-18 ASSESSMENT — PAIN SCALES - GENERAL
PAINLEVEL_OUTOF10: 0

## 2024-06-18 ASSESSMENT — PAIN DESCRIPTION - LOCATION: LOCATION: HIP

## 2024-06-18 ASSESSMENT — PAIN DESCRIPTION - ORIENTATION: ORIENTATION: LEFT

## 2024-06-18 ASSESSMENT — PAIN DESCRIPTION - DESCRIPTORS: DESCRIPTORS: ACHING

## 2024-06-18 NOTE — H&P
History and Physical    Date of Service:  6/18/2024  Primary Care Provider: Juliann Steinberg MD  Source of information: The patient and Chart review    Chief Complaint: Dizziness and unable to keep things this morning      History of Presenting Illness:   Claudia Cantu is a 73 y.o. female with PMH significant for scoliosis, pulmonary fibrosis, HTN, chronic A-fib on Xarelto, prediabetes, HLD, anxiety, depression, glaucoma, and sleep apnea on CPAP nightly presented to [facility] ER with dizziness and unable to get up since this morning.  She stated that at baseline she uses wheelchair because of back pain due to scoliosis.  She had episode of tingling sensation of the lower extremities and generalized weakness. She denied extremities weakness.  No left-sided chest pain, palpitation, abdominal pain, nausea, vomiting or urinary complaints.  She is stated that she has exertional dyspnea because of her pulmonary fibrosis.  She denies headache, blurring of vision, trouble with swallowing, fever, cough  problems with speech speech.     Vital signs on arrival to ER; temperature 98.6, respirate 18, pulse 67, /70, saturation of oxygen 92% on room air  CT head no acute process  Troponin high-sensitivity 27 normal  EKG A-fib ventricular rate 84 bpm nonspecific ST-T wave  Hospitalist services consulted for further evaluation and admission.         REVIEW OF SYSTEMS:  A comprehensive review of systems was negative except for that written in the History of Present Illness.     Past Medical History:   Diagnosis Date    Aneurysm (HCC)     supraclinoid/cerebral    Arrhythmia     AFib    Arthritis     Depression     Diabetes (HCC)     her doc said not she is prediabetic    Hypertension     Ill-defined condition     scoliosis    Ill-defined condition     Other ill-defined conditions(799.89)     glaucoma    Other ill-defined conditions(799.89)     increased cholesterol    Other ill-defined conditions(799.89)     scoliosis

## 2024-06-18 NOTE — ED TRIAGE NOTES
Pt arrives via EMS from home w/ cc of \"brain hurting\". Per EMS, pt reports she was seen here on 6/2/24 for dental pain and was not prescribed abx so she believes her brain stem is swelling. Pt states she is on her 10th day of taking amoxicillin which was prescribed by a dentist she saw after coming to the ER. Pt reports dizziness and shaking when standing. . Pt uses wheelchair at baseline. Pt A&Ox4.     Hx of afib (takes xarelto) and scoliosis.

## 2024-06-18 NOTE — ED PROVIDER NOTES
[facility] EMERGENCY DEP  EMERGENCY DEPARTMENT ENCOUNTER      Pt Name: Claudia Cantu  MRN: [id number]  Birthdate 1950  Date of evaluation: 6/18/2024  Provider: Julius Machado MD    CHIEF COMPLAINT       Chief Complaint   Patient presents with    Dizziness         HISTORY OF PRESENT ILLNESS   (Location/Symptom, Timing/Onset, Context/Setting, Quality, Duration, Modifying Factors, Severity)  Note limiting factors.   73-year-old with a history of hypertension, prediabetes, hyperlipidemia, A-fib on Xarelto, sleep apnea, depression/personality disorder/OCD, anxiety, arthritis, depression.  She presents via EMS with complaints of being not able to stand.  She states that she is \"not all right at all.\"  She has some discomfort to \"the back of my brain\" but denies a headache.  She feels shaky when she tries to stand.  She reports dizziness but denies a room spinning sensation.  She denies a history of similar complaints.  She went to sleep feeling normal at 3 to 4 AM (approximately 10 hours ago) but awoke approximately 2 hours ago with her current symptoms.  She denies feeling anxious.  She states that she was recently seen in the ED for a toothache and is upset because she was not prescribed an antibiotic.  She states that she is normally able to walk.          Review of External Medical Records:     Nursing Notes were reviewed.    REVIEW OF SYSTEMS    (2-9 systems for level 4, 10 or more for level 5)     Review of Systems    Except as noted above the remainder of the review of systems was reviewed and negative.       PAST MEDICAL HISTORY     Past Medical History:   Diagnosis Date    Aneurysm (HCC)     supraclinoid/cerebral    Arrhythmia     AFib    Arthritis     Depression     Diabetes (HCC)     her doc said not she is prediabetic    Hypertension     Ill-defined condition     scoliosis    Ill-defined condition     Other ill-defined conditions(799.89)     glaucoma    Other ill-defined conditions(799.89)     increased

## 2024-06-18 NOTE — TELEPHONE ENCOUNTER
Patient would like a callback she stated she was in the ER and needed to talk to Charo. Patient would not state what it was in reference to.     Patient phone # [phone]

## 2024-06-18 NOTE — PROGRESS NOTES
1625: Report received from ED RN. Attempted to call report to BOAZ Cardenas from 6S.     1635: Called report to BOAZ Cardenas from 6S using SBAR.

## 2024-06-18 NOTE — ED NOTES
Bedside and Verbal shift change report given to BOAZ Anderson (oncoming nurse) by BOAZ Alston (offgoing nurse). Report included the following information Nurse Handoff Report, Index, ED Encounter Summary, ED SBAR, Adult Overview, MAR, and Recent Results.

## 2024-06-18 NOTE — TELEPHONE ENCOUNTER
Called pt. Phone rang at least 15 times, no answer, and patient does not have a voicemail. I'll try calling her back tomorrow.

## 2024-06-19 ENCOUNTER — APPOINTMENT (OUTPATIENT)
Facility: HOSPITAL | Age: 74
DRG: 149 | End: 2024-06-19
Payer: MEDICARE

## 2024-06-19 ENCOUNTER — APPOINTMENT (OUTPATIENT)
Facility: HOSPITAL | Age: 74
DRG: 149 | End: 2024-06-19
Attending: HOSPITALIST
Payer: MEDICARE

## 2024-06-19 LAB
CHOLEST SERPL-MCNC: 146 MG/DL
ECHO BSA: 1.94 M2
ECHO LA DIAMETER INDEX: 2.67 CM/M2
ECHO LA DIAMETER: 5.1 CM
ECHO LV FRACTIONAL SHORTENING: 33 % (ref 28–44)
ECHO LV INTERNAL DIMENSION DIASTOLE INDEX: 2.09 CM/M2
ECHO LV INTERNAL DIMENSION DIASTOLIC: 4 CM (ref 3.9–5.3)
ECHO LV INTERNAL DIMENSION SYSTOLIC INDEX: 1.41 CM/M2
ECHO LV INTERNAL DIMENSION SYSTOLIC: 2.7 CM
ECHO LV IVSD: 0.7 CM (ref 0.6–0.9)
ECHO LV MASS 2D: 109.7 G (ref 67–162)
ECHO LV MASS INDEX 2D: 57.4 G/M2 (ref 43–95)
ECHO LV POSTERIOR WALL DIASTOLIC: 1.1 CM (ref 0.6–0.9)
ECHO LV RELATIVE WALL THICKNESS RATIO: 0.55
ECHO LVOT AREA: 3.5 CM2
ECHO LVOT DIAM: 2.1 CM
ECHO TV REGURGITANT MAX VELOCITY: 2.97 M/S
ECHO TV REGURGITANT PEAK GRADIENT: 35 MMHG
ERYTHROCYTE [DISTWIDTH] IN BLOOD BY AUTOMATED COUNT: 12.5 % (ref 11.5–14.5)
EST. AVERAGE GLUCOSE BLD GHB EST-MCNC: 120 MG/DL
HBA1C MFR BLD: 5.8 % (ref 4–5.6)
HCT VFR BLD AUTO: 44.2 % (ref 35–47)
HDLC SERPL-MCNC: 55 MG/DL
HDLC SERPL: 2.7 (ref 0–5)
HGB BLD-MCNC: 15.2 G/DL (ref 11.5–16)
LDLC SERPL CALC-MCNC: 81.2 MG/DL (ref 0–100)
MCH RBC QN AUTO: 31.6 PG (ref 26–34)
MCHC RBC AUTO-ENTMCNC: 34.4 G/DL (ref 30–36.5)
MCV RBC AUTO: 91.9 FL (ref 80–99)
NRBC # BLD: 0 K/UL (ref 0–0.01)
NRBC BLD-RTO: 0 PER 100 WBC
PLATELET # BLD AUTO: 259 K/UL (ref 150–400)
PMV BLD AUTO: 10.5 FL (ref 8.9–12.9)
RBC # BLD AUTO: 4.81 M/UL (ref 3.8–5.2)
TRIGL SERPL-MCNC: 49 MG/DL
VLDLC SERPL CALC-MCNC: 9.8 MG/DL
WBC # BLD AUTO: 8.2 K/UL (ref 3.6–11)

## 2024-06-19 PROCEDURE — A9579 GAD-BASE MR CONTRAST NOS,1ML: HCPCS | Performed by: HOSPITALIST

## 2024-06-19 PROCEDURE — 72141 MRI NECK SPINE W/O DYE: CPT

## 2024-06-19 PROCEDURE — 97165 OT EVAL LOW COMPLEX 30 MIN: CPT

## 2024-06-19 PROCEDURE — 6370000000 HC RX 637 (ALT 250 FOR IP): Performed by: NURSE PRACTITIONER

## 2024-06-19 PROCEDURE — 36415 COLL VENOUS BLD VENIPUNCTURE: CPT

## 2024-06-19 PROCEDURE — 72148 MRI LUMBAR SPINE W/O DYE: CPT

## 2024-06-19 PROCEDURE — 70553 MRI BRAIN STEM W/O & W/DYE: CPT

## 2024-06-19 PROCEDURE — 94660 CPAP INITIATION&MGMT: CPT

## 2024-06-19 PROCEDURE — 97535 SELF CARE MNGMENT TRAINING: CPT

## 2024-06-19 PROCEDURE — 6370000000 HC RX 637 (ALT 250 FOR IP): Performed by: HOSPITALIST

## 2024-06-19 PROCEDURE — 83036 HEMOGLOBIN GLYCOSYLATED A1C: CPT

## 2024-06-19 PROCEDURE — 85027 COMPLETE CBC AUTOMATED: CPT

## 2024-06-19 PROCEDURE — 80061 LIPID PANEL: CPT

## 2024-06-19 PROCEDURE — 97530 THERAPEUTIC ACTIVITIES: CPT

## 2024-06-19 PROCEDURE — 93308 TTE F-UP OR LMTD: CPT

## 2024-06-19 PROCEDURE — 97161 PT EVAL LOW COMPLEX 20 MIN: CPT

## 2024-06-19 PROCEDURE — 2580000003 HC RX 258: Performed by: HOSPITALIST

## 2024-06-19 PROCEDURE — 72146 MRI CHEST SPINE W/O DYE: CPT

## 2024-06-19 PROCEDURE — 2060000000 HC ICU INTERMEDIATE R&B

## 2024-06-19 PROCEDURE — 6360000004 HC RX CONTRAST MEDICATION: Performed by: HOSPITALIST

## 2024-06-19 RX ORDER — BRIMONIDINE TARTRATE 2 MG/ML
1 SOLUTION/ DROPS OPHTHALMIC EVERY 8 HOURS SCHEDULED
Status: DISCONTINUED | OUTPATIENT
Start: 2024-06-19 | End: 2024-06-23 | Stop reason: HOSPADM

## 2024-06-19 RX ORDER — CALCIUM CARBONATE 500 MG/1
500 TABLET, CHEWABLE ORAL 3 TIMES DAILY PRN
Status: DISCONTINUED | OUTPATIENT
Start: 2024-06-19 | End: 2024-06-23 | Stop reason: HOSPADM

## 2024-06-19 RX ADMIN — SODIUM CHLORIDE: 9 INJECTION, SOLUTION INTRAVENOUS at 08:38

## 2024-06-19 RX ADMIN — RIVAROXABAN 20 MG: 20 TABLET, FILM COATED ORAL at 15:14

## 2024-06-19 RX ADMIN — GADOTERIDOL 20 ML: 279.3 INJECTION, SOLUTION INTRAVENOUS at 04:12

## 2024-06-19 RX ADMIN — SODIUM CHLORIDE, PRESERVATIVE FREE 10 ML: 5 INJECTION INTRAVENOUS at 08:32

## 2024-06-19 RX ADMIN — ACETAMINOPHEN 650 MG: 325 TABLET ORAL at 10:13

## 2024-06-19 RX ADMIN — BRIMONIDINE TARTRATE 1 DROP: 2 SOLUTION OPHTHALMIC at 15:13

## 2024-06-19 RX ADMIN — ACETAMINOPHEN 650 MG: 325 TABLET ORAL at 03:11

## 2024-06-19 RX ADMIN — ACETAMINOPHEN 650 MG: 325 TABLET ORAL at 15:13

## 2024-06-19 ASSESSMENT — PAIN DESCRIPTION - LOCATION
LOCATION: LEG

## 2024-06-19 ASSESSMENT — PAIN SCALES - GENERAL
PAINLEVEL_OUTOF10: 0
PAINLEVEL_OUTOF10: 0
PAINLEVEL_OUTOF10: 7
PAINLEVEL_OUTOF10: 3
PAINLEVEL_OUTOF10: 1

## 2024-06-19 ASSESSMENT — PAIN DESCRIPTION - DESCRIPTORS
DESCRIPTORS: ACHING
DESCRIPTORS: ACHING
DESCRIPTORS: CRAMPING

## 2024-06-19 ASSESSMENT — PAIN DESCRIPTION - ORIENTATION
ORIENTATION: LEFT
ORIENTATION: LEFT

## 2024-06-19 ASSESSMENT — PAIN SCALES - WONG BAKER: WONGBAKER_NUMERICALRESPONSE: NO HURT

## 2024-06-19 NOTE — PROGRESS NOTES
[facility]                                                                                          Hospitalist Progress Note  Twyla Mccartney MD  Answering service: [phone] OR [phone] from in house phone        Date of Service:  2024  NAME:  Cluadia Cantu  :  1950  MRN:  [id number]       Admission Summary:     \"Claudia Cantu is a 73 y.o. female with PMH significant for scoliosis, pulmonary fibrosis, HTN, chronic A-fib on Xarelto, prediabetes, HLD, anxiety, depression, glaucoma, and sleep apnea on CPAP nightly presented to [facility] ER with dizziness and unable to get up since this morning.  She stated that at baseline she uses wheelchair because of back pain due to scoliosis.  She had episode of tingling sensation of the lower extremities and generalized weakness. She denied extremities weakness.  No left-sided chest pain, palpitation, abdominal pain, nausea, vomiting or urinary complaints.  She is stated that she has exertional dyspnea because of her pulmonary fibrosis.  She denies headache, blurring of vision, trouble with swallowing, fever, cough  problems with speech speech.      Vital signs on arrival to ER; temperature 98.6, respirate 18, pulse 67, /70, saturation of oxygen 92% on room air  CT head no acute process  Troponin high-sensitivity 27 normal  EKG A-fib ventricular rate 84 bpm nonspecific ST-T wave  Hospitalist services consulted for further evaluation and admission.\"    Interval history / Subjective:     Patient seen and examined at the bedside. She complain pain at the back of her head, left side of hip pain. No chest pain, palpitation or shortness of breath. She said she is anxious and depressed but no SI, she would like to see a psychiatrist.     Code status discussed and patient wants DDNR. ACP discussion documented      Assessment & Plan:     Unsteady and unable to stand up, hx of scoliosis   -MRI of the brain no acute

## 2024-06-19 NOTE — ACP (ADVANCE CARE PLANNING)
Advance Care Planning     Advance Care Planning Inpatient Note  [facility] Department    Today's Date: 6/19/2024  Unit: [facility] 6S NEURO-SCI TELE    Received request from IDT Member and Other: Theresa Nagy RN .  Upon review of chart and communication with care team, patient's decision making abilities are not in question.. Patient was/were present in the room during visit.    Goals of ACP Conversation:  Discuss advance care planning documents  Facilitate a discussion related to patient's goals of care as they align with the patient's values and beliefs.    Health Care Decision Makers:       Primary Decision Maker: Vishal Cantu - Other - [phone]    Primary Decision Maker: Elizabeth Cantu - Brother/Sister - [phone]  Summary:  Verified Documents  Documented Next of Kin, per patient report  No Decision Maker named by patient at this time    Advance Care Planning Documents (Patient Wishes):  Healthcare Power of /Advance Directive Appointment of Health Care Agent  Living Will/Advance Directive     Assessment:  Initiated visit to Claudia Cantu in response to spiritual consult for advance care planning.  Verified AMD with Claudia Cantu.  Ms. Cantu did not name a decision maker in the document or in the AMD as she did not have some one she wanted to appoint at this time.  This  explained the state decision making hierarchy.  Her legal next of kin remain as decision makers - sister Elizabeth Cantu ([phone]) and brother Vishal Cantu ([phone]).  Her wishes are documented in the AMD on file, which Ms. Cantu confirmed.  She had questions about her code status. Referred her to her doctor and bedside nurse for discussions about her code status.  Consulted bedside nurse about this information.  Printed out copies of her AMD so she could share with her LNOK. Encouraged her to have conversation about her wishes with her LNOK. Encouraged her to contact spiritual healthcare team if she would like to complete AMD in

## 2024-06-19 NOTE — PLAN OF CARE
Problem: Occupational Therapy - Adult  Goal: By Discharge: Performs self-care activities at highest level of function for planned discharge setting.  See evaluation for individualized goals.  Description: FUNCTIONAL STATUS PRIOR TO ADMISSION:  Patient was modified independent for functional mobility, primarily using wheelchair as standing time limited to ~5 minute periods (associated with back pain and scoliosis). Patient transferring and performing all ADLs without assistance. She receives meals on wheels, warming food in oven. Patient reports bathing via sponge bath or sitting in tub. Declines fall history.    ADL Assistance: Independent, Homemaking Assistance: Independent, Ambulation Assistance:  (Cannot stand > 5 minutes without back pain. Primarily uses wheelchair.), Transfer Assistance: Independent,       HOME SUPPORT: Patient lived alone but has friendly neighbor.    Occupational Therapy Goals:  Initiated 6/19/2024  1.  Patient will perform grooming with Modified O'Brien within 7 day(s).  2.  Patient will perform upper body dressing and lower body dressing with Modified O'Brien within 7 day(s).  3.  Patient will perform bathing with Modified O'Brien within 7 day(s).  4.  Patient will perform simple home management with Modified O'Brien  within 7 day(s).  5.  Patient will perform all aspects of toileting with Modified O'Brien within 7 day(s).  6.  Patient will participate in upper extremity therapeutic exercise/activities with Supervision for 10 minutes within 7 day(s).    Outcome: Progressing   OCCUPATIONAL THERAPY EVALUATION    Patient: Claudia Cantu (73 y.o. female)  Date: 6/19/2024  Primary Diagnosis: Dizziness [R42]  Unsteady [R26.81]         Precautions: Fall Risk                  ASSESSMENT :  The patient is limited by decreased functional mobility, independence in ADLs, high-level IADLs, strength, activity tolerance, safety awareness, cognition, command following,  attention/concentration, coordination, balance, posture, and increased pain levels. Note CT and MRI negative for acute process. This date, patient received resting in bed, amenable to session with education and gentle encouragement. She benefits from frequent redirection to task 2/2 tendency for tangential and perseverative speech. Patient perceives needing significant assistance for safe sit<>stand, however completes with CGA. Anticipate up to mod assist needed for safe completion of lower body ADLs. At this time, patient presents significant fall risk and safety concern as she lives alone with limited support. For best outcome, recommend discharge to SNF. Should patient opt for return home, recommend  with increased supervision/assist.     Functional Outcome Measure:  The patient scored 17/24 on the AMPAC.        PLAN :  Recommendations and Planned Interventions:   self care training, therapeutic activities, functional mobility training, balance training, therapeutic exercise, endurance activities, cognitive retraining, patient education, and home safety training    Frequency/Duration: OT Plan of Care: 3 times/week    Recommendation for discharge: (in order for the patient to meet his/her long term goals): Therapy up to 5 days/week in Skilled nursing facility    Other factors to consider for discharge: lives alone, no local support, poor safety awareness, impaired cognition, high risk for falls, not safe to be alone, and concern for safely navigating or managing the home environment    IF patient discharges home will need the following DME:  TBD       SUBJECTIVE:   \"Something just isn't right in my head. There's something neurological.\"    \"My hand clawed up and I couldn't open it, so I looked at it and I said 'Ajit, let it be healed' and then it was fine.'\"    OBJECTIVE DATA SUMMARY:     Past Medical History:   Diagnosis Date    Aneurysm (HCC)     supraclinoid/cerebral    Arrhythmia     AFib    Arthritis

## 2024-06-19 NOTE — TELEPHONE ENCOUNTER
Pt called. Verified patient's identity with two identifiers. Pt stated she is at [facility]. She said she had a pain in the back of her brain and when she went to stand up she couldn't. She is worried she might have a stroke and that they will discharge her home without finding out what is wrong with her or send her to a nursing home, which she does not want to go to. Patient said she has no family around, she is scared, and said she is going to ask the doctor if she can talk with a psychologist. Advised she share all of these concerns with her attending doctor. Pt asked that I notify Dr. Michael of everything going on and ask him to pray for her to be healed. I told her I would send him the message. Patient verbalized understanding and denied further questions or concerns.

## 2024-06-19 NOTE — ACP (ADVANCE CARE PLANNING)
Advance Care Planning     Advance Care Planning (ACP) Physician/NP/PA Conversation    Date of Conversation: 6/18/2024  Conducted with: Patient with Decision Making Capacity  Other persons present: None    Healthcare Decision Maker:     Primary Decision Maker: Vishal Cantu - Other - [phone]  Primary Decision Maker: AlondraElizabeth - Brother/Sister - [phone]      Care Preferences:    Hospitalization:  \"If your health worsens and it becomes clear that your chance of recovery is unlikely, what would be your preference regarding hospitalization?\"  The patient would prefer hospitalization.    Ventilation:  \"If you were unable to breath on your own and your chance of recovery was unlikely, what would be your preference about the use of a ventilator (breathing machine) if it was available to you?\"  The patient would NOT desire the use of a ventilator.    Resuscitation:  \"In the event your heart stopped as a result of an underlying serious health condition, would you want attempts made to restart your heart, or would you prefer a natural death?\"  No, do NOT attempt to resuscitate.    treatment goals, benefit/burden of treatment options, artificial nutrition, ventilation preferences, hospitalization preferences, and resuscitation preferences    Conversation Outcomes / Follow-Up Plan:  ACP complete - no further action today  Reviewed DNR/DNI and patient elects DNR order - completed portable DNR form & placed order    Length of Voluntary ACP Conversation in minutes:  16 minutes    Twyla Mccartney MD  6/19/2024

## 2024-06-19 NOTE — CARE COORDINATION
Care Management Initial Assessment       RUR: 13%  Readmission? No  1st IM letter given? Yes     LOKESH: pt admitted from home  - PT/OT evals pending for discharge     CM met with pt at bedside to introduce self and role. Pt lives alone in a senior living apartment. Pt requested psych consult; Dr. Mccartney informed.    ADLs: independent  DME: uses wheelchair - can transfer independently   PCP follow up: no PCP, declined PCP setup   Previous Home Health: none  Previous Skilled Nursing Facility: none  Previous Inpatient Rehab: none  Insurance verified: yes; Medicare part A&B with [place] supplement  Pharmacy: [facility]  Emergency Contact: Vishal Cantu; [phone]    Pt has two living family members - brother in [place] and sister in [place]. Pt reports very little social support.     CM will follow patient progress and assist as needed with LOKESH plan.       06/19/24 1005   Service Assessment   Patient Orientation Alert and Oriented;Self;Person;Place   Cognition Alert   History Provided By Patient   Primary Caregiver Self   Support Systems Family Members   Patient's Healthcare Decision Maker is: Named in Scanned ACP Document   PCP Verified by CM No  (pt does not have PCP, declined setup)   Prior Functional Level Independent in ADLs/IADLs   Current Functional Level Assistance with the following:;Mobility   Can patient return to prior living arrangement Unknown at present   Ability to make needs known: Fair   Family able to assist with home care needs: No   Social/Functional History   Lives With Alone   Type of Home Senior housing apartment   Home Equipment Wheelchair - Manual   ADL Assistance Independent   Homemaking Assistance Independent   Homemaking Responsibilities Yes   Ambulation Assistance Needs assistance   Transfer Assistance Independent   Discharge Planning   Type of Residence House   Living Arrangements Alone   Current Services Prior To Admission C-pap   Services At/After Discharge   Mode of Transport

## 2024-06-19 NOTE — PROGRESS NOTES
Initiated visit to Claudia Catnu in response to spiritual consult for advance care planning.  Verified AMD with Claudia Cantu.  Ms. Cantu did not name a decision maker in the document or in the AMD as she did not have some one she wanted to appoint at this time.  This  explained the state decision making hierarchy.  Her legal next of kin remain as decision makers - sister Elizabeth Cantu ([phone]) and brother Vishal Cantu ([phone]).  Her wishes are documented in the AMD on file, which Ms. Cantu confirmed.  She had questions about her code status. Referred her to her doctor and bedside nurse for discussions about her code status.  Consulted bedside nurse about this information.  Printed out copies of her AMD so she could share with her LNOK. Encouraged her to have conversation about her wishes with her LNOK. Encouraged her to contact spiritual healthcare team if she would like to complete AMD in future.  Ms. Cantu was experiencing spiritual isolation due to lack of near by family and friends. She share concerns and needs. Provided active listening and emotional support. Ms. Cantu appeared to experience connection during visit.  She would welcome future spiritual health visits. Patient is aware of 's availability and was encouraged to request support as needed. For additional spiritual care, please contact the  on-call at [phone] ([phone]).     Rev. Sharon Álvarez MDiv, MSW  Chaplain Resident

## 2024-06-19 NOTE — PLAN OF CARE
Impaired  Initiation: Requires cues for some    Skin:     Edema:     Hearing:        Vision/Perceptual:          Vision  Vision: Impaired  Vision Exceptions: Wears glasses at all times (\"my vision is like 20 over 700\")       Strength:    Strength: Generally decreased, functional    Tone & Sensation:           Coordination:  Coordination: Generally decreased, functional    Range Of Motion:  AROM: Generally decreased, functional  PROM: Generally decreased, functional    Functional Mobility:  Bed Mobility:     Bed Mobility Training  Bed Mobility Training: Yes  Overall Level of Assistance: Contact-guard assistance  Interventions: Safety awareness training;Tactile cues  Supine to Sit: Contact-guard assistance  Sit to Supine: Contact-guard assistance  Scooting: Contact-guard assistance  Transfers:     Transfer Training  Transfer Training: Yes  Overall Level of Assistance: Contact-guard assistance  Interventions: Safety awareness training;Tactile cues;Verbal cues  Sit to Stand: Contact-guard assistance  Stand to Sit: Contact-guard assistance  Balance:               Balance  Sitting: Impaired  Sitting - Static: Good (unsupported)  Sitting - Dynamic: Fair (occasional)  Standing: Impaired  Standing - Static: Good;Constant support  Standing - Dynamic: Fair;Constant support  Ambulation/Gait Training:                       Gait  Gait Training: Yes  Overall Level of Assistance: Contact-guard assistance  Distance (ft): 3 Feet  Assistive Device: Gait belt;Walker, rollator  Interventions: Safety awareness training;Tactile cues;Verbal cues  Speed/[place]: Shuffled;Slow  Step Length: Right shortened;Left shortened  Gait Abnormalities: Shuffling gait;Trunk sway increased                 Wheelchair Management  Wheelchair Management: No

## 2024-06-19 NOTE — PLAN OF CARE
Problem: Discharge Planning  Goal: Discharge to home or other facility with appropriate resources  Outcome: Progressing     Problem: Skin/Tissue Integrity  Goal: Absence of new skin breakdown  Description: 1.  Monitor for areas of redness and/or skin breakdown  2.  Assess vascular access sites hourly  3.  Every 4-6 hours minimum:  Change oxygen saturation probe site  4.  Every 4-6 hours:  If on nasal continuous positive airway pressure, respiratory therapy assess nares and determine need for appliance change or resting period.  Outcome: Progressing     Problem: ABCDS Injury Assessment  Goal: Absence of physical injury  Outcome: Progressing     Problem: Safety - Adult  Goal: Free from fall injury  Outcome: Progressing     Problem: Chronic Conditions and Co-morbidities  Goal: Patient's chronic conditions and co-morbidity symptoms are monitored and maintained or improved  Outcome: Progressing

## 2024-06-20 PROCEDURE — 6370000000 HC RX 637 (ALT 250 FOR IP): Performed by: NURSE PRACTITIONER

## 2024-06-20 PROCEDURE — 2060000000 HC ICU INTERMEDIATE R&B

## 2024-06-20 PROCEDURE — 6370000000 HC RX 637 (ALT 250 FOR IP): Performed by: HOSPITALIST

## 2024-06-20 PROCEDURE — 2580000003 HC RX 258: Performed by: HOSPITALIST

## 2024-06-20 PROCEDURE — 97535 SELF CARE MNGMENT TRAINING: CPT

## 2024-06-20 PROCEDURE — 94660 CPAP INITIATION&MGMT: CPT

## 2024-06-20 PROCEDURE — 6370000000 HC RX 637 (ALT 250 FOR IP): Performed by: INTERNAL MEDICINE

## 2024-06-20 RX ORDER — ASCORBIC ACID 500 MG
1000 TABLET ORAL DAILY
Status: DISCONTINUED | OUTPATIENT
Start: 2024-06-20 | End: 2024-06-23 | Stop reason: HOSPADM

## 2024-06-20 RX ORDER — ATORVASTATIN CALCIUM 10 MG/1
10 TABLET, FILM COATED ORAL NIGHTLY
Status: DISCONTINUED | OUTPATIENT
Start: 2024-06-20 | End: 2024-06-23 | Stop reason: HOSPADM

## 2024-06-20 RX ADMIN — BRIMONIDINE TARTRATE 1 DROP: 2 SOLUTION OPHTHALMIC at 00:23

## 2024-06-20 RX ADMIN — SODIUM CHLORIDE, PRESERVATIVE FREE 10 ML: 5 INJECTION INTRAVENOUS at 09:58

## 2024-06-20 RX ADMIN — OXYCODONE HYDROCHLORIDE AND ACETAMINOPHEN 1000 MG: 500 TABLET ORAL at 19:01

## 2024-06-20 RX ADMIN — ACETAMINOPHEN 650 MG: 325 TABLET ORAL at 22:55

## 2024-06-20 RX ADMIN — SODIUM CHLORIDE, PRESERVATIVE FREE 10 ML: 5 INJECTION INTRAVENOUS at 00:25

## 2024-06-20 RX ADMIN — SODIUM CHLORIDE, PRESERVATIVE FREE 10 ML: 5 INJECTION INTRAVENOUS at 21:46

## 2024-06-20 RX ADMIN — BRIMONIDINE TARTRATE 1 DROP: 2 SOLUTION OPHTHALMIC at 21:46

## 2024-06-20 RX ADMIN — ACETAMINOPHEN 650 MG: 325 TABLET ORAL at 00:30

## 2024-06-20 RX ADMIN — BRIMONIDINE TARTRATE 1 DROP: 2 SOLUTION OPHTHALMIC at 14:15

## 2024-06-20 RX ADMIN — BRIMONIDINE TARTRATE 1 DROP: 2 SOLUTION OPHTHALMIC at 09:57

## 2024-06-20 RX ADMIN — ATORVASTATIN CALCIUM 10 MG: 10 TABLET, FILM COATED ORAL at 21:46

## 2024-06-20 RX ADMIN — CALCIUM CARBONATE (ANTACID) CHEW TAB 500 MG 500 MG: 500 CHEW TAB at 11:24

## 2024-06-20 RX ADMIN — RIVAROXABAN 20 MG: 20 TABLET, FILM COATED ORAL at 12:21

## 2024-06-20 ASSESSMENT — PAIN SCALES - GENERAL
PAINLEVEL_OUTOF10: 2
PAINLEVEL_OUTOF10: 0
PAINLEVEL_OUTOF10: 4

## 2024-06-20 NOTE — CARE COORDINATION
Transition of Care Plan:    SNF - referrals sent to [facility] and [facility]  - 3 night stay; eligible for DC on 6/22    Transport - BLS     RUR: 11%  Prior Level of Functioning: independent  Disposition: SNF  If SNF or IPR: Date FOC offered: 6/20   Date FOC received:  6/20  Accepting facility: pending   Follow up appointments:   DME needed: none  Transportation at discharge: BLS  IM/IMM Medicare/ letter given: 6/19  Caregiver Contact:   Vishal Cantu (Other)  [phone] (Mobile)    Discharge Caregiver contacted prior to discharge? No  Care Conference needed? No  Barriers to discharge: needs 3 night stay to qualify for SNF    PT/OT/Dr. Reza recommending SNF at discharge. Pt agreed to plan. [facility]. Referrals to both on 6/20.      06/20/24 8962   Condition of Participation: Discharge Planning   The Plan for Transition of Care is related to the following treatment goals: SNF   The Patient and/or Patient Representative was provided with a Choice of Provider? Patient   The Patient and/Or Patient Representative agree with the Discharge Plan? Yes   Freedom of Choice list was provided with basic dialogue that supports the patient's individualized plan of care/goals, treatment preferences, and shares the quality data associated with the providers?  Yes     JACQUES Palmer

## 2024-06-20 NOTE — PROGRESS NOTES
[facility]                                                                                          Hospitalist Progress Note  Amber Reza MD  Answering service: [phone] OR [phone] from in house phone        Date of Service:  2024  NAME:  Claudia Cantu  :  1950  MRN:  [id number]       Admission Summary:     \"Claudia Cantu is a 73 y.o. female with PMH significant for scoliosis, pulmonary fibrosis, HTN, chronic A-fib on Xarelto, prediabetes, HLD, anxiety, depression, glaucoma, and sleep apnea on CPAP nightly presented to [facility] ER with dizziness and unable to get up since this morning.  She stated that at baseline she uses wheelchair because of back pain due to scoliosis.  She had episode of tingling sensation of the lower extremities and generalized weakness. She denied extremities weakness.  No left-sided chest pain, palpitation, abdominal pain, nausea, vomiting or urinary complaints.  She is stated that she has exertional dyspnea because of her pulmonary fibrosis.  She denies headache, blurring of vision, trouble with swallowing, fever, cough  problems with speech speech.      Vital signs on arrival to ER; temperature 98.6, respirate 18, pulse 67, /70, saturation of oxygen 92% on room air  CT head no acute process  Troponin high-sensitivity 27 normal  EKG A-fib ventricular rate 84 bpm nonspecific ST-T wave  Hospitalist services consulted for further evaluation and admission.\"    Interval history / Subjective:     Patient seen and examined at the bedside. She complain pain at the back of her head, left side of hip pain. No chest pain, palpitation or shortness of breath. She said she is anxious and depressed but no SI, she would like to see a psychiatrist.     Code status discussed and patient wants DDNR. ACP discussion documented      Assessment & Plan:     Unsteady and unable to stand up, hx of scoliosis   -MRI of the brain no acute intracranial  clinical/nonclinical/ nursing providers based on care coordination needs.         Patients current active Medications were reviewed, considered, added and adjusted based on the clinical condition today.      Home Medications were reconciled to the best of my ability given all available resources at the time of admission. Route is PO if not otherwise noted.      Admission Status:[id number]:::1}      Medications Reviewed:     Current Facility-Administered Medications   Medication Dose Route Frequency    atorvastatin (LIPITOR) tablet 10 mg  10 mg Oral Nightly    ascorbic acid (VITAMIN C) tablet 1,000 mg  1,000 mg Oral Daily    calcium carbonate (TUMS) chewable tablet 500 mg  500 mg Oral TID PRN    brimonidine (ALPHAGAN) 0.2 % ophthalmic solution 1 drop  1 drop Both Eyes 3 times per day    rivaroxaban (XARELTO) tablet 20 mg  20 mg Oral Daily    sodium chloride flush 0.9 % injection 5-40 mL  5-40 mL IntraVENous 2 times per day    sodium chloride flush 0.9 % injection 5-40 mL  5-40 mL IntraVENous PRN    0.9 % sodium chloride infusion   IntraVENous PRN    ondansetron (ZOFRAN-ODT) disintegrating tablet 4 mg  4 mg Oral Q8H PRN    Or    ondansetron (ZOFRAN) injection 4 mg  4 mg IntraVENous Q6H PRN    polyethylene glycol (GLYCOLAX) packet 17 g  17 g Oral Daily PRN    [Held by provider] 0.9 % sodium chloride infusion   IntraVENous Continuous    hydrALAZINE (APRESOLINE) injection 10 mg  10 mg IntraVENous Q6H PRN    benzocaine (ORAJEL) 20 % mucosal gel 1 Application  1 Application Mouth/Throat TID PRN    cloNIDine (CATAPRES) tablet 0.1 mg  0.1 mg Oral Q6H PRN    acetaminophen (TYLENOL) tablet 650 mg  650 mg Oral Q4H PRN     ______________________________________________________________________  EXPECTED LENGTH OF STAY: Unable to retrieve estimated LOS  ACTUAL LENGTH OF STAY:          2                 Amber Reza MD

## 2024-06-20 NOTE — PROGRESS NOTES
Patient was referred to spiritual care and  provided a follow up visit. Patient was calm and sitting up in chair. She discussed her current living situation in which she feels lonely and feels there is some bad feelings towards her.   She described her concerned over her sister-in-law's diagnosis. She also expressed concern over her sister's declining health.  listened and provided support and prayer. Spiritual care will remain available to patient as she expresses need.  Chaplain Cordell Intern  Sp[facility]  Paging service: [phone] (PRAY)

## 2024-06-20 NOTE — PLAN OF CARE
Problem: Discharge Planning  Goal: Discharge to home or other facility with appropriate resources  6/20/2024 1141 by Kenzie Bey RN  Outcome: Progressing  Flowsheets (Taken 6/20/2024 0837)  Discharge to home or other facility with appropriate resources: Identify barriers to discharge with patient and caregiver  6/20/2024 0133 by Severson, Constance M, RN  Outcome: Progressing  Flowsheets (Taken 6/19/2024 2000)  Discharge to home or other facility with appropriate resources: Identify barriers to discharge with patient and caregiver     Problem: Skin/Tissue Integrity  Goal: Absence of new skin breakdown  Description: 1.  Monitor for areas of redness and/or skin breakdown  2.  Assess vascular access sites hourly  3.  Every 4-6 hours minimum:  Change oxygen saturation probe site  4.  Every 4-6 hours:  If on nasal continuous positive airway pressure, respiratory therapy assess nares and determine need for appliance change or resting period.  6/20/2024 1141 by Kenzie Bey RN  Outcome: Progressing  6/20/2024 0133 by Severson, Constance M, RN  Outcome: Progressing     Problem: ABCDS Injury Assessment  Goal: Absence of physical injury  6/20/2024 1141 by Kenzie Bey RN  Outcome: Progressing  6/20/2024 0133 by Severson, Constance M, RN  Outcome: Progressing     Problem: Safety - Adult  Goal: Free from fall injury  6/20/2024 1141 by Kenzie Bey RN  Outcome: Progressing  6/20/2024 0133 by Severson, Constance M, RN  Outcome: Progressing     Problem: Chronic Conditions and Co-morbidities  Goal: Patient's chronic conditions and co-morbidity symptoms are monitored and maintained or improved  6/20/2024 1141 by Kenzie Bey RN  Outcome: Progressing  Flowsheets (Taken 6/20/2024 0837)  Care Plan - Patient's Chronic Conditions and Co-Morbidity Symptoms are Monitored and Maintained or Improved:   Monitor and assess patient's chronic conditions and comorbid symptoms for stability, deterioration, or improvement   Update acute care  plan with appropriate goals if chronic or comorbid symptoms are exacerbated and prevent overall improvement and discharge   Collaborate with multidisciplinary team to address chronic and comorbid conditions and prevent exacerbation or deterioration  6/20/2024 0133 by Severson, Constance M, RN  Outcome: Progressing  Flowsheets (Taken 6/19/2024 2000)  Care Plan - Patient's Chronic Conditions and Co-Morbidity Symptoms are Monitored and Maintained or Improved: Monitor and assess patient's chronic conditions and comorbid symptoms for stability, deterioration, or improvement

## 2024-06-20 NOTE — BSMART NOTE
Section II - Psychosocial  The patient's overall mood and attitude is depressed and anxious.  Feelings of helplessness and hopelessness are observed by Pt statements and inability shift from problem to solution focused thinking.  Generalized anxiety is observed by Pt statement and perseveration on lack of support system.  Panic is not observed. Phobias are not observed.  Obsessive compulsive tendencies are not observed.      Section III - Mental Status Exam  The patient's appearance is unkempt.  The patient's behavior shows no evidence of impairment. The patient is only aware of  time, place, person, and situation.  The patient's speech shows no evidence of impairment.  The patient's mood is depressed and is anxious.  The range of affect shows no evidence of impairment.  The patient's thought content demonstrates no evidence of impairment.  The thought process is tangential.  The patient's perception shows no evidence of impairment. The patient's memory is impaired.  The patient's appetite is increased. The patient's sleep has evidence of insomnia. The patient's insight is blaming.  The patient's judgement is impaired.      The patient has demonstrated mental capacity to provide informed consent.    Section IV - Substance Abuse  The patient is not using substances.      Section V - Medical  Past Medical History:   Diagnosis Date    Aneurysm (HCC)     supraclinoid/cerebral    Arrhythmia     AFib    Arthritis     Depression     Diabetes (HCC)     her doc said not she is prediabetic    Hypertension     Ill-defined condition     scoliosis    Ill-defined condition     Other ill-defined conditions(799.89)     glaucoma    Other ill-defined conditions(799.89)     increased cholesterol    Other ill-defined conditions(799.89)     scoliosis    Other ill-defined conditions(799.89)     back pain    Other ill-defined conditions(799.89)     insomnia    Other ill-defined conditions(799.89)     abnormal wt gain    Other  ill-defined conditions(799.89)     breast lump - left side at 3 o'clock position    Other ill-defined conditions(799.89) 1994    shingles    Other ill-defined conditions(799.89)     CTS    Other ill-defined conditions(799.89)     colon polyps    Other ill-defined conditions(799.89)     cataracts    Other ill-defined conditions(799.89)     vitamin D deficiency - hx of    Other ill-defined conditions(799.89)     JAIMIE    Psychiatric disorder     depression/personality disorder/OCD    Scoliosis     Sleep apnea     Unspecified sleep apnea     sleep study 15 yrs ago/was told to come back and get a CPAP, but pt did not       Section VI - Living Arrangements  The patient Single.  The patient lives alone in senior living apartments. The patient has no children.  The patient does plan to return home upon discharge. The patient's source of income comes from social security.    The patient's greatest support comes from brother and sister-in-law and this person will not be involved with the treatment. The patient has not been in an event described as horrible or outside the realm of ordinary life experience either currently or in the past. The patient has not been a victim of sexual/physical abuse.    Section VII - Other Areas of Clinical Concern    The highest grade achieved is college (PhD) with the overall quality of school experience being described as n/a. The patient is currently disabled and speaks English as a primary language.  The patient has no communication impairments affecting communication. The patient's preference for learning can be described as: can read and write adequately.  The patient's hearing is normal.  The patient's vision is impaired and  wears glasses or contacts.        Imelda Schultz MSW

## 2024-06-20 NOTE — PLAN OF CARE
Problem: Occupational Therapy - Adult  Goal: By Discharge: Performs self-care activities at highest level of function for planned discharge setting.  See evaluation for individualized goals.  Description: FUNCTIONAL STATUS PRIOR TO ADMISSION:  Patient was modified independent for functional mobility, primarily using wheelchair as standing time limited to ~5 minute periods (associated with back pain and scoliosis). Patient transferring and performing all ADLs without assistance. She receives meals on wheels, warming food in oven. Patient reports bathing via sponge bath or sitting in tub. Declines fall history.    ADL Assistance: Independent, Homemaking Assistance: Independent, Ambulation Assistance:  (Cannot stand > 5 minutes without back pain. Primarily uses wheelchair.), Transfer Assistance: Independent,       HOME SUPPORT: Patient lived alone but has friendly neighbor.    Occupational Therapy Goals:  Initiated 6/19/2024  1.  Patient will perform grooming with Modified [place] within 7 day(s).  2.  Patient will perform upper body dressing and lower body dressing with Modified [place] within 7 day(s).  3.  Patient will perform bathing with Modified [place] within 7 day(s).  4.  Patient will perform simple home management with Modified [place]  within 7 day(s).  5.  Patient will perform all aspects of toileting with Modified [place] within 7 day(s).  6.  Patient will participate in upper extremity therapeutic exercise/activities with Supervision for 10 minutes within 7 day(s).    Outcome: Progressing    OCCUPATIONAL THERAPY TREATMENT  Patient: Claudia Cantu (73 y.o. female)  Date: 6/20/2024  Primary Diagnosis: Dizziness [R42]  Unsteady [R26.81]       Precautions: Fall Risk                Chart, occupational therapy assessment, plan of care, and goals were reviewed.    ASSESSMENT  Patient continues to benefit from skilled OT services and is progressing towards goals but continues to be

## 2024-06-20 NOTE — PLAN OF CARE
Problem: Discharge Planning  Goal: Discharge to home or other facility with appropriate resources  6/20/2024 0133 by Severson, Constance M, RN  Outcome: Progressing  Flowsheets (Taken 6/19/2024 2000)  Discharge to home or other facility with appropriate resources: Identify barriers to discharge with patient and caregiver  6/19/2024 1159 by Valentina Medeiros RN  Outcome: Progressing     Problem: Skin/Tissue Integrity  Goal: Absence of new skin breakdown  Description: 1.  Monitor for areas of redness and/or skin breakdown  2.  Assess vascular access sites hourly  3.  Every 4-6 hours minimum:  Change oxygen saturation probe site  4.  Every 4-6 hours:  If on nasal continuous positive airway pressure, respiratory therapy assess nares and determine need for appliance change or resting period.  6/20/2024 0133 by Severson, Constance M, RN  Outcome: Progressing  6/19/2024 1159 by Valentina Medeiros RN  Outcome: Progressing     Problem: ABCDS Injury Assessment  Goal: Absence of physical injury  6/20/2024 0133 by Severson, Constance M, RN  Outcome: Progressing  6/19/2024 1159 by Valentina Medeiros RN  Outcome: Progressing     Problem: Safety - Adult  Goal: Free from fall injury  6/20/2024 0133 by Severson, Constance M, RN  Outcome: Progressing  6/19/2024 1159 by Valentina Medeiros RN  Outcome: Progressing     Problem: Chronic Conditions and Co-morbidities  Goal: Patient's chronic conditions and co-morbidity symptoms are monitored and maintained or improved  6/20/2024 0133 by Severson, Constance M, RN  Outcome: Progressing  Flowsheets (Taken 6/19/2024 2000)  Care Plan - Patient's Chronic Conditions and Co-Morbidity Symptoms are Monitored and Maintained or Improved: Monitor and assess patient's chronic conditions and comorbid symptoms for stability, deterioration, or improvement  6/19/2024 1159 by Valentina Medeiros RN  Outcome: Progressing     Problem: Physical Therapy - Adult  Goal: By Discharge: Performs mobility at highest  [place] within 7 day(s).  2.  Patient will perform upper body dressing and lower body dressing with Modified [place] within 7 day(s).  3.  Patient will perform bathing with Modified [place] within 7 day(s).  4.  Patient will perform simple home management with Modified [place]  within 7 day(s).  5.  Patient will perform all aspects of toileting with Modified [place] within 7 day(s).  6.  Patient will participate in upper extremity therapeutic exercise/activities with Supervision for 10 minutes within 7 day(s).    6/19/2024 1631 by Cristina Wilks, OT  Outcome: Progressing

## 2024-06-20 NOTE — PROGRESS NOTES
[facility]  PSYCHIATRY CONSULT NOTE:    Name: Claudia Cantu  MR#: [id number]  : 1950  ACCOUNT#: [id number]  ADMIT DATE: 2024    Patient seen for initial consult with regards to increased anxiety and depression.   She clearly stated to me she has absolutely no interest in any medication and only wants someone to talk with that \"must be intelligent and must be \".   I informed her we can not assure that as a general guideline at this facility and attempted to assure her all team members are well adept at their chosen profession if working here.     I discussed /shared with RN, Unit Manager, and Attending. Recommended to consult pastoral care as well for spiritual supportive councel and will reach out to TIERRA Riojas to work with [facility] while hospitalized and refer for any needed outpatient resources.     Thank you and please do not hesitate to reach out or call, re-consult if needed or she decides medications are an option.     Abby Bragg [facility]

## 2024-06-21 ENCOUNTER — APPOINTMENT (OUTPATIENT)
Facility: HOSPITAL | Age: 74
DRG: 149 | End: 2024-06-21
Payer: MEDICARE

## 2024-06-21 LAB
ANION GAP SERPL CALC-SCNC: 0 MMOL/L (ref 5–15)
BUN SERPL-MCNC: 16 MG/DL (ref 6–20)
BUN/CREAT SERPL: 26 (ref 12–20)
CALCIUM SERPL-MCNC: 8.9 MG/DL (ref 8.5–10.1)
CHLORIDE SERPL-SCNC: 113 MMOL/L (ref 97–108)
CO2 SERPL-SCNC: 29 MMOL/L (ref 21–32)
CREAT SERPL-MCNC: 0.62 MG/DL (ref 0.55–1.02)
ERYTHROCYTE [DISTWIDTH] IN BLOOD BY AUTOMATED COUNT: 12.6 % (ref 11.5–14.5)
GLUCOSE SERPL-MCNC: 97 MG/DL (ref 65–100)
HCT VFR BLD AUTO: 42.5 % (ref 35–47)
HGB BLD-MCNC: 14.3 G/DL (ref 11.5–16)
MAGNESIUM SERPL-MCNC: 2.2 MG/DL (ref 1.6–2.4)
MCH RBC QN AUTO: 31.6 PG (ref 26–34)
MCHC RBC AUTO-ENTMCNC: 33.6 G/DL (ref 30–36.5)
MCV RBC AUTO: 93.8 FL (ref 80–99)
NRBC # BLD: 0 K/UL (ref 0–0.01)
NRBC BLD-RTO: 0 PER 100 WBC
PHOSPHATE SERPL-MCNC: 2.7 MG/DL (ref 2.6–4.7)
PLATELET # BLD AUTO: 221 K/UL (ref 150–400)
PMV BLD AUTO: 11 FL (ref 8.9–12.9)
POTASSIUM SERPL-SCNC: 4.2 MMOL/L (ref 3.5–5.1)
RBC # BLD AUTO: 4.53 M/UL (ref 3.8–5.2)
SODIUM SERPL-SCNC: 142 MMOL/L (ref 136–145)
WBC # BLD AUTO: 7.9 K/UL (ref 3.6–11)

## 2024-06-21 PROCEDURE — 6370000000 HC RX 637 (ALT 250 FOR IP): Performed by: INTERNAL MEDICINE

## 2024-06-21 PROCEDURE — 84100 ASSAY OF PHOSPHORUS: CPT

## 2024-06-21 PROCEDURE — 2580000003 HC RX 258: Performed by: HOSPITALIST

## 2024-06-21 PROCEDURE — 2060000000 HC ICU INTERMEDIATE R&B

## 2024-06-21 PROCEDURE — 36415 COLL VENOUS BLD VENIPUNCTURE: CPT

## 2024-06-21 PROCEDURE — 6370000000 HC RX 637 (ALT 250 FOR IP): Performed by: HOSPITALIST

## 2024-06-21 PROCEDURE — 85027 COMPLETE CBC AUTOMATED: CPT

## 2024-06-21 PROCEDURE — 94660 CPAP INITIATION&MGMT: CPT

## 2024-06-21 PROCEDURE — 83735 ASSAY OF MAGNESIUM: CPT

## 2024-06-21 PROCEDURE — 80048 BASIC METABOLIC PNL TOTAL CA: CPT

## 2024-06-21 PROCEDURE — 73501 X-RAY EXAM HIP UNI 1 VIEW: CPT

## 2024-06-21 PROCEDURE — 6370000000 HC RX 637 (ALT 250 FOR IP): Performed by: NURSE PRACTITIONER

## 2024-06-21 RX ORDER — POLYETHYLENE GLYCOL 3350 17 G/17G
17 POWDER, FOR SOLUTION ORAL DAILY
Status: DISCONTINUED | OUTPATIENT
Start: 2024-06-21 | End: 2024-06-23 | Stop reason: HOSPADM

## 2024-06-21 RX ORDER — CLONIDINE 0.3 MG/24H
1 PATCH, EXTENDED RELEASE TRANSDERMAL WEEKLY
Status: DISCONTINUED | OUTPATIENT
Start: 2024-06-21 | End: 2024-06-23 | Stop reason: HOSPADM

## 2024-06-21 RX ADMIN — ATORVASTATIN CALCIUM 10 MG: 10 TABLET, FILM COATED ORAL at 22:30

## 2024-06-21 RX ADMIN — RIVAROXABAN 20 MG: 20 TABLET, FILM COATED ORAL at 10:02

## 2024-06-21 RX ADMIN — SODIUM CHLORIDE, PRESERVATIVE FREE 10 ML: 5 INJECTION INTRAVENOUS at 10:02

## 2024-06-21 RX ADMIN — ACETAMINOPHEN 650 MG: 325 TABLET ORAL at 22:30

## 2024-06-21 RX ADMIN — OXYCODONE HYDROCHLORIDE AND ACETAMINOPHEN 1000 MG: 500 TABLET ORAL at 10:02

## 2024-06-21 RX ADMIN — ACETAMINOPHEN 650 MG: 325 TABLET ORAL at 06:02

## 2024-06-21 RX ADMIN — BRIMONIDINE TARTRATE 1 DROP: 2 SOLUTION OPHTHALMIC at 14:10

## 2024-06-21 RX ADMIN — BRIMONIDINE TARTRATE 1 DROP: 2 SOLUTION OPHTHALMIC at 06:02

## 2024-06-21 RX ADMIN — BRIMONIDINE TARTRATE 1 DROP: 2 SOLUTION OPHTHALMIC at 22:32

## 2024-06-21 RX ADMIN — POLYETHYLENE GLYCOL 3350 17 G: 17 POWDER, FOR SOLUTION ORAL at 17:20

## 2024-06-21 ASSESSMENT — PAIN DESCRIPTION - DESCRIPTORS: DESCRIPTORS: ACHING

## 2024-06-21 ASSESSMENT — PAIN DESCRIPTION - LOCATION
LOCATION: GENERALIZED
LOCATION: HIP

## 2024-06-21 ASSESSMENT — PAIN SCALES - GENERAL
PAINLEVEL_OUTOF10: 4
PAINLEVEL_OUTOF10: 7

## 2024-06-21 ASSESSMENT — PAIN DESCRIPTION - ORIENTATION: ORIENTATION: LEFT

## 2024-06-21 NOTE — PLAN OF CARE
comorbid symptoms for stability, deterioration, or improvement  6/20/2024 1141 by Kenzie Bey, RN  Outcome: Progressing  Flowsheets (Taken 6/20/2024 0883)  Care Plan - Patient's Chronic Conditions and Co-Morbidity Symptoms are Monitored and Maintained or Improved:   Monitor and assess patient's chronic conditions and comorbid symptoms for stability, deterioration, or improvement   Update acute care plan with appropriate goals if chronic or comorbid symptoms are exacerbated and prevent overall improvement and discharge   Collaborate with multidisciplinary team to address chronic and comorbid conditions and prevent exacerbation or deterioration     Problem: Occupational Therapy - Adult  Goal: By Discharge: Performs self-care activities at highest level of function for planned discharge setting.  See evaluation for individualized goals.  Description: FUNCTIONAL STATUS PRIOR TO ADMISSION:  Patient was modified independent for functional mobility, primarily using wheelchair as standing time limited to ~5 minute periods (associated with back pain and scoliosis). Patient transferring and performing all ADLs without assistance. She receives meals on wheels, warming food in oven. Patient reports bathing via sponge bath or sitting in tub. Declines fall history.    ADL Assistance: Independent, Homemaking Assistance: Independent, Ambulation Assistance:  (Cannot stand > 5 minutes without back pain. Primarily uses wheelchair.), Transfer Assistance: Independent,       HOME SUPPORT: Patient lived alone but has friendly neighbor.    Occupational Therapy Goals:  Initiated 6/19/2024  1.  Patient will perform grooming with Modified [place] within 7 day(s).  2.  Patient will perform upper body dressing and lower body dressing with Modified [place] within 7 day(s).  3.  Patient will perform bathing with Modified [place] within 7 day(s).  4.  Patient will perform simple home management with Modified [place]  within 7  day(s).  5.  Patient will perform all aspects of toileting with Modified [place] within 7 day(s).  6.  Patient will participate in upper extremity therapeutic exercise/activities with Supervision for 10 minutes within 7 day(s).    6/20/2024 1224 by Imelda Grant  Outcome: Progressing

## 2024-06-21 NOTE — PROGRESS NOTES
Physical Therapy Note  06/21/2024    Chart reviewed in prep for PT tx session; attempted to see however deeply engaged with multiple visitors at bedside. Will defer and follow back up as able.    Layne Silva, PT, DPT

## 2024-06-21 NOTE — CARE COORDINATION
Transition of Care Plan:    SNF - [facility] - 6/22  RN will call report to [phone]; Room 229  CM to fax DC to [phone]    Transport - [facility] 6/22 11am (transport packet on chart)    RUR: 10%  Prior Level of Functioning: independent  Disposition: SNF  If SNF or IPR: Date FOC offered: 6/20   Date FOC received:  6/20  Accepting facility: [facility]  Follow up appointments:   DME needed: none  Transportation at discharge: BLS  IM/IMM Medicare/ letter given: 6/19  Caregiver Contact:   Vishal Cantu (Other)  [phone] (Mobile)  Discharge Caregiver contacted prior to discharge? No  Care Conference needed? No  Barriers to discharge: needs 3 night stay to qualify for SNF    [facility] has accepted Pt and can admit 6/22. No response from [facility] (Pt 1st choice), VM left for admissions.    [facility] scheduled for [facility]am 6/22 to [facility].    2:00pm: KRYS spoke with Janki in Admissions at [facility] - they can accept Pt tomorrow. [facility] destination changed to [place].     KRYS met with Pt at bedside to discuss discharge plan, she is in agreement with discharge tomorrow to [facility] .    Alicia Worthington M.S (Ally).CARLOS.

## 2024-06-21 NOTE — CONSULTS
[facility]  PSYCHIATRIC CONSULT NOTE:    Name: Claudia Cantu  MR#: [id number]  : 1950  ACCOUNT#: [id number]  ADMIT DATE: 2024    REASON FOR CONSULT: Anxiety/depression    HISTORY OF PRESENTING COMPLAINT:  Claudia Cantu is a 73 y.o. female with PMH of scoliosis, pulmonary fibrosis, HTN, chronic A-fib on Xarelto, prediabetes, HLD, anxiety, depression, glaucoma, and sleep apnea on CPAP, who is currently admitted to the medical floor at [facility]. Psychiatry is asked to consult on the pt for possible depression/anxiety. The pt met with psychiatry earlier this week, and the pt was opposed to taking medications but was interested in talking, and she did meet with the  as well. Today during evaluation, the pt was calm and appropriate, though quite anxious about her next steps. She reported deep concern for her sister in law, who has stage 4 cancer, as the pt states she has only 3 living family members left and she worries about who will protect her and advocate for her if her family is no longer able to do that. She is anxious about several situational stressors too,  notably financial worries, worrying about her belongings and what will happen to them if she has to go to a nursing home, and feels lonely. She denies any SI/HI/AH/VH.     PAST PSYCHIATRIC HISTORY: No significant past psychiatric hx.     PSYCHOSOCIAL HISTORY:The pt is single, has no children. 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     MENTAL STATUS EXAM:   73 y.o. female in moderate grooming, dressed in hospital gown, well engaged in evaluation.   Makes fair eye contact.  Psychomotor activity is WNL, no adventitious movements  Speech is normal in volume, tone, output and prosody   Mood is described as \"worried\"   Affect is congruent with mood, anxious  No perceptual abnormalities elicited; no auditory/visual hallucinations reported, no overt signs of psychosis or paranoia.   Thought process is logical,

## 2024-06-21 NOTE — PROGRESS NOTES
[facility]                                                                                          Hospitalist Progress Note  Amber Reza MD  Answering service: [phone] OR [phone] from in house phone        Date of Service:  2024  NAME:  Claudia Cantu  :  1950  MRN:  [id number]       Admission Summary:     \"Claudia Cantu is a 73 y.o. female with PMH significant for scoliosis, pulmonary fibrosis, HTN, chronic A-fib on Xarelto, prediabetes, HLD, anxiety, depression, glaucoma, and sleep apnea on CPAP nightly presented to [facility] ER with dizziness and unable to get up since this morning.  She stated that at baseline she uses wheelchair because of back pain due to scoliosis.  She had episode of tingling sensation of the lower extremities and generalized weakness. She denied extremities weakness.  No left-sided chest pain, palpitation, abdominal pain, nausea, vomiting or urinary complaints.  She is stated that she has exertional dyspnea because of her pulmonary fibrosis.  She denies headache, blurring of vision, trouble with swallowing, fever, cough  problems with speech speech.      Vital signs on arrival to ER; temperature 98.6, respirate 18, pulse 67, /70, saturation of oxygen 92% on room air  CT head no acute process  Troponin high-sensitivity 27 normal  EKG A-fib ventricular rate 84 bpm nonspecific ST-T wave  Hospitalist services consulted for further evaluation and admission.\"    Interval history / Subjective:     Patient seen and examined, feels slightly better.  She is anxious about going to a nursing home and asking how long she has to stay there.  X-ray of left hip ordered, as patient was complaining of pain on her left lateral hip.  She does not think this is arthritis.     Assessment & Plan:     Unsteady and unable to stand up, hx of scoliosis   -MRI of the brain no acute intracranial abnormality.    -Echo:     Left Ventricle: Normal left

## 2024-06-21 NOTE — PLAN OF CARE
Problem: Discharge Planning  Goal: Discharge to home or other facility with appropriate resources  6/21/2024 1016 by Yamel Lmia RN  Outcome: Progressing  Flowsheets (Taken 6/21/2024 0800)  Discharge to home or other facility with appropriate resources: Identify barriers to discharge with patient and caregiver  6/21/2024 0138 by Chasidy Salinas RN  Outcome: Progressing  Flowsheets (Taken 6/20/2024 2000)  Discharge to home or other facility with appropriate resources: Identify barriers to discharge with patient and caregiver     Problem: Skin/Tissue Integrity  Goal: Absence of new skin breakdown  Description: 1.  Monitor for areas of redness and/or skin breakdown  2.  Assess vascular access sites hourly  3.  Every 4-6 hours minimum:  Change oxygen saturation probe site  4.  Every 4-6 hours:  If on nasal continuous positive airway pressure, respiratory therapy assess nares and determine need for appliance change or resting period.  6/21/2024 1016 by Yamel Lima RN  Outcome: Progressing  6/21/2024 0138 by Chasidy Salinas RN  Outcome: Progressing     Problem: ABCDS Injury Assessment  Goal: Absence of physical injury  6/21/2024 1016 by Yamel Lima RN  Outcome: Progressing  Flowsheets (Taken 6/21/2024 0800)  Absence of Physical Injury: Implement safety measures based on patient assessment  6/21/2024 0138 by Chasidy Salinas RN  Outcome: Progressing     Problem: Safety - Adult  Goal: Free from fall injury  6/21/2024 1016 by Yamel Lima RN  Outcome: Progressing  Flowsheets (Taken 6/21/2024 0800)  Free From Fall Injury: Instruct family/caregiver on patient safety  6/21/2024 0138 by Chasidy Salinas RN  Outcome: Progressing  Flowsheets (Taken 6/21/2024 0137)  Free From Fall Injury: Instruct family/caregiver on patient safety     Problem: Chronic Conditions and Co-morbidities  Goal: Patient's chronic conditions and co-morbidity symptoms are monitored and maintained or improved  6/21/2024 1016 by Clarence

## 2024-06-22 LAB
ANION GAP SERPL CALC-SCNC: 6 MMOL/L (ref 5–15)
BUN SERPL-MCNC: 22 MG/DL (ref 6–20)
BUN/CREAT SERPL: 35 (ref 12–20)
CALCIUM SERPL-MCNC: 8.5 MG/DL (ref 8.5–10.1)
CHLORIDE SERPL-SCNC: 114 MMOL/L (ref 97–108)
CO2 SERPL-SCNC: 24 MMOL/L (ref 21–32)
CREAT SERPL-MCNC: 0.62 MG/DL (ref 0.55–1.02)
ERYTHROCYTE [DISTWIDTH] IN BLOOD BY AUTOMATED COUNT: 12.9 % (ref 11.5–14.5)
GLUCOSE SERPL-MCNC: 200 MG/DL (ref 65–100)
HCT VFR BLD AUTO: 41 % (ref 35–47)
HGB BLD-MCNC: 13.7 G/DL (ref 11.5–16)
MAGNESIUM SERPL-MCNC: 2.1 MG/DL (ref 1.6–2.4)
MCH RBC QN AUTO: 31.9 PG (ref 26–34)
MCHC RBC AUTO-ENTMCNC: 33.4 G/DL (ref 30–36.5)
MCV RBC AUTO: 95.3 FL (ref 80–99)
NRBC # BLD: 0 K/UL (ref 0–0.01)
NRBC BLD-RTO: 0 PER 100 WBC
PHOSPHATE SERPL-MCNC: 2.9 MG/DL (ref 2.6–4.7)
PLATELET # BLD AUTO: 222 K/UL (ref 150–400)
PMV BLD AUTO: 11.1 FL (ref 8.9–12.9)
POTASSIUM SERPL-SCNC: 3.6 MMOL/L (ref 3.5–5.1)
RBC # BLD AUTO: 4.3 M/UL (ref 3.8–5.2)
SODIUM SERPL-SCNC: 144 MMOL/L (ref 136–145)
WBC # BLD AUTO: 7.1 K/UL (ref 3.6–11)

## 2024-06-22 PROCEDURE — 6370000000 HC RX 637 (ALT 250 FOR IP): Performed by: HOSPITALIST

## 2024-06-22 PROCEDURE — 84100 ASSAY OF PHOSPHORUS: CPT

## 2024-06-22 PROCEDURE — 2060000000 HC ICU INTERMEDIATE R&B

## 2024-06-22 PROCEDURE — 80048 BASIC METABOLIC PNL TOTAL CA: CPT

## 2024-06-22 PROCEDURE — 6370000000 HC RX 637 (ALT 250 FOR IP): Performed by: INTERNAL MEDICINE

## 2024-06-22 PROCEDURE — 94660 CPAP INITIATION&MGMT: CPT

## 2024-06-22 PROCEDURE — 36415 COLL VENOUS BLD VENIPUNCTURE: CPT

## 2024-06-22 PROCEDURE — 2580000003 HC RX 258: Performed by: HOSPITALIST

## 2024-06-22 PROCEDURE — 83735 ASSAY OF MAGNESIUM: CPT

## 2024-06-22 PROCEDURE — 6370000000 HC RX 637 (ALT 250 FOR IP): Performed by: NURSE PRACTITIONER

## 2024-06-22 PROCEDURE — 85027 COMPLETE CBC AUTOMATED: CPT

## 2024-06-22 PROCEDURE — 94760 N-INVAS EAR/PLS OXIMETRY 1: CPT

## 2024-06-22 PROCEDURE — 99222 1ST HOSP IP/OBS MODERATE 55: CPT | Performed by: INTERNAL MEDICINE

## 2024-06-22 RX ADMIN — BRIMONIDINE TARTRATE 1 DROP: 2 SOLUTION OPHTHALMIC at 14:19

## 2024-06-22 RX ADMIN — RIVAROXABAN 20 MG: 20 TABLET, FILM COATED ORAL at 08:53

## 2024-06-22 RX ADMIN — BRIMONIDINE TARTRATE 1 DROP: 2 SOLUTION OPHTHALMIC at 20:59

## 2024-06-22 RX ADMIN — ATORVASTATIN CALCIUM 10 MG: 10 TABLET, FILM COATED ORAL at 20:59

## 2024-06-22 RX ADMIN — SODIUM CHLORIDE, PRESERVATIVE FREE 10 ML: 5 INJECTION INTRAVENOUS at 08:53

## 2024-06-22 RX ADMIN — POLYETHYLENE GLYCOL 3350 17 G: 17 POWDER, FOR SOLUTION ORAL at 08:53

## 2024-06-22 RX ADMIN — OXYCODONE HYDROCHLORIDE AND ACETAMINOPHEN 1000 MG: 500 TABLET ORAL at 08:53

## 2024-06-22 RX ADMIN — ACETAMINOPHEN 650 MG: 325 TABLET ORAL at 20:59

## 2024-06-22 NOTE — PROGRESS NOTES
visited patient after being paged to her room by staff.  had met with patient on 6/20/24. She appears a bit anxious about being discharged to rehab, leaving her apartment, not having clothes for rehab.and other issues.  listened and offered support. Provided reading material and will bring her a Bible.   will be available as needed by request.  Chaplain Cordell Intern  [facility]  Paging service: [phone] (CHERIE)

## 2024-06-22 NOTE — PLAN OF CARE
Problem: Skin/Tissue Integrity  Goal: Absence of new skin breakdown  Description: 1.  Monitor for areas of redness and/or skin breakdown  2.  Assess vascular access sites hourly  3.  Every 4-6 hours minimum:  Change oxygen saturation probe site  4.  Every 4-6 hours:  If on nasal continuous positive airway pressure, respiratory therapy assess nares and determine need for appliance change or resting period.  Outcome: Progressing     Problem: Chronic Conditions and Co-morbidities  Goal: Patient's chronic conditions and co-morbidity symptoms are monitored and maintained or improved  Outcome: Progressing  Flowsheets (Taken 6/21/2024 2000)  Care Plan - Patient's Chronic Conditions and Co-Morbidity Symptoms are Monitored and Maintained or Improved: Monitor and assess patient's chronic conditions and comorbid symptoms for stability, deterioration, or improvement     Problem: Discharge Planning  Goal: Discharge to home or other facility with appropriate resources  Recent Flowsheet Documentation  Taken 6/21/2024 2000 by Rosita Tinajero RN  Discharge to home or other facility with appropriate resources: Identify barriers to discharge with patient and caregiver

## 2024-06-22 NOTE — CONSULTS
[facility] CARDIOLOGY                       Cardiology Care Note     [x]Initial Consult     []Progress note    Patient Name: Claudia Cantu - :1950 - MRN:[id number]  Primary Cardiologist: Peter Michael MD       Reason for consult:  Pauses, A-fib    HPI:       Claudia Cantu is a 73 y.o. female patient of Dr. Michael with a history of chronic atrial fibrillation on Xarelto for OAC, LEBRON, scoliosis, DNR who presented with generalized weakness.    Cardiology is consulted due to bradycardia, pauses at night    SUBJECTIVE:    Denies any exertional chest pain.  Her breathing's been stable.  No significant palpitations.  No recent syncope. Echo 24 EF 60-65%.     Assessment and Plan     Chronic A-fib.  Apparently at one point she was on verapamil but currently not on any luis agents.  Her heart rate is overall controlled.  She did have some greater than 2-second pauses, however this was at night while she was asleep and asymptomatic.  No indication for pacemaker at this time.  Reassured her.  No additional cardiac recommendations or evaluation indicated at this time.  Will be available as needed.  Outpatient follow-up with Dr. Michael.   Chronic anticoagulation.  Continue Xarelto.  No bleeding issues           ____________________________________________________________    Cardiac testing  24    ECHO (TTE) LIMITED (PRN CONTRAST/BUBBLE/STRAIN/3D) 2024  9:35 PM (Final)    Interpretation Summary    Left Ventricle: Normal left ventricular systolic function with a visually estimated EF of 60 - 65%. Left ventricle size is normal. Mildly increased wall thickness. Normal wall motion.    Right Ventricle: Not well visualized.    Aortic Valve: Trileaflet valve. Mildly calcified cusp.    Mitral Valve: Mildly thickened leaflet.    Left Atrium: Left atrium is moderately dilated.    Image quality is fair.    Signed by: Meet Zavala MD on 2024  9:35

## 2024-06-22 NOTE — CARE COORDINATION
KRYS messaged Dr. Reza about pt's d/c. She said that pt is not ready for d/c today. KRYS notified nursing at [facility] and cancelled trip with [facility]. Anay Urias,BSW,ACM-SW

## 2024-06-22 NOTE — PROGRESS NOTES
0230 [facility] calls and state patient's heart rate dropping into the 30's due to a 2.4 second pause. Candice Jones NP notified. No new orders at this time.    0330[facility] calls and state patient's heart rate dropping into the 30's due to a 2.6econd pause. Candice Jones NP notified. No new orders at this time.

## 2024-06-22 NOTE — PROGRESS NOTES
[facility]                                                                                          Hospitalist Progress Note  Amber Reza MD  Answering service: [phone] OR [phone] from in house phone        Date of Service:  2024  NAME:  Claudia Cantu  :  1950  MRN:  [id number]       Admission Summary:     \"Claudia Cantu is a 73 y.o. female with PMH significant for scoliosis, pulmonary fibrosis, HTN, chronic A-fib on Xarelto, prediabetes, HLD, anxiety, depression, glaucoma, and sleep apnea on CPAP nightly presented to [facility] ER with dizziness and unable to get up since this morning.  She stated that at baseline she uses wheelchair because of back pain due to scoliosis.  She had episode of tingling sensation of the lower extremities and generalized weakness. She denied extremities weakness.  No left-sided chest pain, palpitation, abdominal pain, nausea, vomiting or urinary complaints.  She is stated that she has exertional dyspnea because of her pulmonary fibrosis.  She denies headache, blurring of vision, trouble with swallowing, fever, cough  problems with speech speech.      Vital signs on arrival to ER; temperature 98.6, respirate 18, pulse 67, /70, saturation of oxygen 92% on room air  CT head no acute process  Troponin high-sensitivity 27 normal  EKG A-fib ventricular rate 84 bpm nonspecific ST-T wave  Hospitalist services consulted for further evaluation and admission.\"    Interval history / Subjective:     Patient seen and examined.   Overnight events noted: Patient had several short pauses on her cardiac monitor, cardiology was consulted.   Patient is anxious about going to a nursing home.  Left hip x-ray showed osteoarthritis, discussed results with patient.     Assessment & Plan:     Unsteady and unable to stand up, hx of scoliosis   -CT head: No acute process ;  -MRI brain: no acute intracranial abnormality.    -MRI C-T-L spine:  1.   same.     Labs:     Recent Labs     06/21/24  0450 06/22/24  0056   WBC 7.9 7.1   HGB 14.3 13.7   HCT 42.5 41.0    222       Recent Labs     06/21/24  0450 06/22/24  0056    144   K 4.2 3.6   * 114*   CO2 29 24   BUN 16 22*   MG 2.2 2.1   PHOS 2.7 2.9       No results for input(s): \"ALT\", \"TP\", \"GLOB\", \"GGT\" in the last 72 hours.    Invalid input(s): \"SGOT\", \"GPT\", \"AP\", \"TBIL\", \"TBILI\", \"ALB\", \"AML\", \"AMYP\", \"LPSE\", \"HLPSE\"    No results for input(s): \"INR\", \"APTT\" in the last 72 hours.    Invalid input(s): \"PTP\"   No results for input(s): \"TIBC\" in the last 72 hours.    Invalid input(s): \"FE\", \"PSAT\", \"FERR\"   No results found for: \"RBCF\"   No results for input(s): \"PH\", \"PCO2\", \"PO2\" in the last 72 hours.  No results for input(s): \"CPK\" in the last 72 hours.    Invalid input(s): \"CPKMB\", \"CKNDX\", \"TROIQ\"  Lab Results   Component Value Date/Time    CHOL 146 06/19/2024 04:41 AM    HDL 55 06/19/2024 04:41 AM    LDL 81.2 06/19/2024 04:41 AM    LDL 69 10/22/2019 04:19 PM     No results found for: \"GLUCPOC\"  [unfilled]    Notes reviewed from all clinical/nonclinical/nursing services involved in patient's clinical care. Care coordination discussions were held with appropriate clinical/nonclinical/ nursing providers based on care coordination needs.         Patients current active Medications were reviewed, considered, added and adjusted based on the clinical condition today.      Home Medications were reconciled to the best of my ability given all available resources at the time of admission. Route is PO if not otherwise noted.      Admission Status:[id number]:::1}      Medications Reviewed:     Current Facility-Administered Medications   Medication Dose Route Frequency    cloNIDine (CATAPRES) 0.3 MG/24HR 1 patch  1 patch TransDERmal Weekly    polyethylene glycol (GLYCOLAX) packet 17 g  17 g Oral Daily    magnesium hydroxide (MILK OF MAGNESIA) 400 MG/5ML suspension 30 mL  30 mL Oral Daily PRN

## 2024-06-23 ENCOUNTER — APPOINTMENT (OUTPATIENT)
Facility: HOSPITAL | Age: 74
DRG: 149 | End: 2024-06-23
Payer: MEDICARE

## 2024-06-23 VITALS
DIASTOLIC BLOOD PRESSURE: 75 MMHG | TEMPERATURE: 98.1 F | BODY MASS INDEX: 38.74 KG/M2 | HEART RATE: 63 BPM | WEIGHT: 210.54 LBS | RESPIRATION RATE: 21 BRPM | SYSTOLIC BLOOD PRESSURE: 139 MMHG | HEIGHT: 62 IN | OXYGEN SATURATION: 94 %

## 2024-06-23 LAB
ANION GAP SERPL CALC-SCNC: 4 MMOL/L (ref 5–15)
BUN SERPL-MCNC: 19 MG/DL (ref 6–20)
BUN/CREAT SERPL: 35 (ref 12–20)
CALCIUM SERPL-MCNC: 8.7 MG/DL (ref 8.5–10.1)
CHLORIDE SERPL-SCNC: 116 MMOL/L (ref 97–108)
CO2 SERPL-SCNC: 22 MMOL/L (ref 21–32)
CREAT SERPL-MCNC: 0.54 MG/DL (ref 0.55–1.02)
GLUCOSE SERPL-MCNC: 90 MG/DL (ref 65–100)
MAGNESIUM SERPL-MCNC: 2.1 MG/DL (ref 1.6–2.4)
PHOSPHATE SERPL-MCNC: 3.4 MG/DL (ref 2.6–4.7)
POTASSIUM SERPL-SCNC: 5 MMOL/L (ref 3.5–5.1)
SODIUM SERPL-SCNC: 142 MMOL/L (ref 136–145)

## 2024-06-23 PROCEDURE — 6370000000 HC RX 637 (ALT 250 FOR IP): Performed by: HOSPITALIST

## 2024-06-23 PROCEDURE — 83735 ASSAY OF MAGNESIUM: CPT

## 2024-06-23 PROCEDURE — 6360000004 HC RX CONTRAST MEDICATION: Performed by: FAMILY MEDICINE

## 2024-06-23 PROCEDURE — 72147 MRI CHEST SPINE W/DYE: CPT

## 2024-06-23 PROCEDURE — 80048 BASIC METABOLIC PNL TOTAL CA: CPT

## 2024-06-23 PROCEDURE — 36415 COLL VENOUS BLD VENIPUNCTURE: CPT

## 2024-06-23 PROCEDURE — 2580000003 HC RX 258: Performed by: HOSPITALIST

## 2024-06-23 PROCEDURE — A9579 GAD-BASE MR CONTRAST NOS,1ML: HCPCS | Performed by: FAMILY MEDICINE

## 2024-06-23 PROCEDURE — 84100 ASSAY OF PHOSPHORUS: CPT

## 2024-06-23 PROCEDURE — 6370000000 HC RX 637 (ALT 250 FOR IP): Performed by: INTERNAL MEDICINE

## 2024-06-23 RX ORDER — SODIUM CHLORIDE 0.9 % (FLUSH) 0.9 %
10 SYRINGE (ML) INJECTION ONCE
Status: DISCONTINUED | OUTPATIENT
Start: 2024-06-23 | End: 2024-06-23 | Stop reason: HOSPADM

## 2024-06-23 RX ORDER — CALCIUM CARBONATE 500 MG/1
2 TABLET, CHEWABLE ORAL 3 TIMES DAILY PRN
Qty: 30 TABLET | Refills: 0 | Status: SHIPPED
Start: 2024-06-23

## 2024-06-23 RX ORDER — CLONIDINE 0.3 MG/24H
PATCH, EXTENDED RELEASE TRANSDERMAL
Qty: 12 PATCH | Refills: 1 | Status: SHIPPED
Start: 2024-06-28

## 2024-06-23 RX ORDER — AMLODIPINE BESYLATE 5 MG/1
5 TABLET ORAL DAILY
Status: DISCONTINUED | OUTPATIENT
Start: 2024-06-23 | End: 2024-06-23 | Stop reason: HOSPADM

## 2024-06-23 RX ADMIN — RIVAROXABAN 20 MG: 20 TABLET, FILM COATED ORAL at 09:22

## 2024-06-23 RX ADMIN — AMLODIPINE BESYLATE 5 MG: 5 TABLET ORAL at 13:55

## 2024-06-23 RX ADMIN — BRIMONIDINE TARTRATE 1 DROP: 2 SOLUTION OPHTHALMIC at 13:57

## 2024-06-23 RX ADMIN — BRIMONIDINE TARTRATE 1 DROP: 2 SOLUTION OPHTHALMIC at 06:57

## 2024-06-23 RX ADMIN — GADOTERIDOL 20 ML: 279.3 INJECTION, SOLUTION INTRAVENOUS at 10:08

## 2024-06-23 RX ADMIN — OXYCODONE HYDROCHLORIDE AND ACETAMINOPHEN 1000 MG: 500 TABLET ORAL at 07:54

## 2024-06-23 RX ADMIN — SODIUM CHLORIDE, PRESERVATIVE FREE 10 ML: 5 INJECTION INTRAVENOUS at 07:54

## 2024-06-23 RX ADMIN — POLYETHYLENE GLYCOL 3350 17 G: 17 POWDER, FOR SOLUTION ORAL at 07:54

## 2024-06-23 NOTE — PLAN OF CARE
Problem: Skin/Tissue Integrity  Goal: Absence of new skin breakdown  Description: 1.  Monitor for areas of redness and/or skin breakdown  2.  Assess vascular access sites hourly  3.  Every 4-6 hours minimum:  Change oxygen saturation probe site  4.  Every 4-6 hours:  If on nasal continuous positive airway pressure, respiratory therapy assess nares and determine need for appliance change or resting period.  Outcome: Progressing     Problem: Safety - Adult  Goal: Free from fall injury  Outcome: Progressing     Problem: Discharge Planning  Goal: Discharge to home or other facility with appropriate resources  Recent Flowsheet Documentation  Taken 6/22/2024 2000 by Rosita Tinajero, BOAZ  Discharge to home or other facility with appropriate resources: Identify barriers to discharge with patient and caregiver     Problem: Chronic Conditions and Co-morbidities  Goal: Patient's chronic conditions and co-morbidity symptoms are monitored and maintained or improved  Recent Flowsheet Documentation  Taken 6/22/2024 2000 by Rosita Tinajero, RN  Care Plan - Patient's Chronic Conditions and Co-Morbidity Symptoms are Monitored and Maintained or Improved: Monitor and assess patient's chronic conditions and comorbid symptoms for stability, deterioration, or improvement

## 2024-06-23 NOTE — DISCHARGE SUMMARY
Discharge Summary       PATIENT ID: Claudia Cantu  MRN: [id number]   YOB: 1950    DATE OF ADMISSION: 6/18/2024  1:17 PM    DATE OF DISCHARGE: 6/23/2024  PRIMARY CARE PROVIDER: Juliann Steinberg MD     DISCHARGING PROVIDER: Amber Reza MD    To contact this individual call [phone] and ask the  to page.  If unavailable ask to be transferred the Adult Hospitalist Department.    CONSULTATIONS: IP CONSULT TO HOSPITALIST  IP CONSULT TO SPIRITUAL SERVICES  IP CONSULT TO PSYCHIATRY  IP CONSULT TO SPIRITUAL SERVICES  IP CONSULT TO CARDIOLOGY    PROCEDURES/SURGERIES: * No surgery found *     ADMITTING DIAGNOSES & HOSPITAL COURSE:   HPI:  Claudia Cantu is a 73 y.o. female with PMH significant for scoliosis, pulmonary fibrosis, HTN, chronic A-fib on Xarelto, prediabetes, HLD, anxiety, depression, glaucoma, and sleep apnea on CPAP nightly presented to [facility] ER with dizziness and unable to get up since this morning.  She stated that at baseline she uses wheelchair because of back pain due to scoliosis.  She had episode of tingling sensation of the lower extremities and generalized weakness. She denied extremities weakness.  No left-sided chest pain, palpitation, abdominal pain, nausea, vomiting or urinary complaints.  She is stated that she has exertional dyspnea because of her pulmonary fibrosis.  She denies headache, blurring of vision, trouble with swallowing, fever, cough, problems with speech.      Vital signs on arrival to ER; temperature 98.6, respirate 18, pulse 67, /70, saturation of oxygen 92% on room air  CT head no acute process  Troponin high-sensitivity 27 normal  EKG A-fib ventricular rate 84 bpm nonspecific ST-T wave  Patient was admitted by the hospitalist team for further workup and treatment.       HOSPITAL COURSE:  Unsteady and unable to stand up, hx of scoliosis   -CT head: No acute process ;  -MRI brain: no acute intracranial abnormality.    -MRI C-T-L spine:  1.  [place]

## 2024-06-23 NOTE — CARE COORDINATION
CM called [facility] to confirm that they could still accept pt today.  CM then called [facility] to reschedule transport for today, they can provide transport at 3:00pm. Envelope containing MARs, Kardex, and discharge summary will be sent with pt.  Nurse will call report to [phone].  Janki Montes, VERNONW, ACM

## 2024-06-23 NOTE — CARE COORDINATION
Care Management - Received call from patient's RN. Patient called her to say that she was at [facility] and they did not have a CPAP for her to use. Explained this is understood. The patient would need to bring her own CPAP. She said patient was using [facility] CPAP (later determined she was using bi-pap). She transferred patient to this 's phone.     Spoke with patient. She said she was upset the facility nurse did not tell the [facility] nurse they did not have a CPAP. Explained to patient she would have to use her own CPAP when at a facility. Patient confirmed she does have a CPAP at home. Asked if she has a friend who could go bring her CPAP from home. She said the only person who could do that has an ankle fracture. Asked if friend has a family member who could do it. She said they do not. Asked if another friend or person from [facility] would be able to do it. She said not today. Patient requested  set up transportation for her to go home. Explained this CM will call discharging physician and will call her back.     Per chart review, patient lives at [facility]. Their number is [phone]. Called the number. No one is on site. The number was for emergency maintenance issues only.     Spoke with hospitalist. Explained all of the above. She said patient could be placed on 2 liters of oxygen while sleeping until she can get her CPAP. Patient is not safe to go home alone. If she requests to leave the facility, it would be AMA. She gave CM some history on patient.     Called [facility] at [phone] (option 4, then option 2). Patient is in room 229. Spoke with BOAZ Torrez weekend supervisor. She said she has a nurse that is willing to go pick it up, but nurse does not feel comfortable going in to patient's apartment. Suggested they ask patient if she has a neighbor that could go in and get it if the nurse unlocked the door for them. She has also spoken with their on call

## 2024-06-23 NOTE — DISCHARGE INSTRUCTIONS
Discharge Instructions       PATIENT ID: Claudia Cantu  MRN: [id number]   YOB: 1950    DATE OF ADMISSION: 6/18/2024   DATE OF DISCHARGE: 6/23/2024    PRIMARY CARE PROVIDER: Juliann Steinberg     ATTENDING PHYSICIAN: Amber Reza MD   DISCHARGING PROVIDER: Amber Reza MD    To contact this individual call [phone] and ask the  to page.   If unavailable ask to be transferred the Adult Hospitalist Department.    DISCHARGE DIAGNOSES   Gait dysfunction/unsteady gait  Difficulties standing up  Severe scoliosis;   -Stroke workup negative;  -Continue home Xarelto and statin  -Continue PT/OT;  -Discharge to SNF;    Severe scoliosis  -MRI of the thoracic spine showed multilevel spinal canal stenosis, most severe at the T7-T8, syrinx spanning T7-T9;   -Patient is at baseline essentially wheelchair-bound, other than transfers;  -Chronic neck and back pain;    HTN  -Continue home BP medications     Permanent A Fib, rate controlled  -Continue home cardiac medications    PreDiabetes  -HgA1c 5.8  -Diet controlled     Anxiety and depression   -outpatient follow-up with a psychologist/counseling  -Patient declined any medications     HLD   -Continue home lovastatin     Obstructive sleep apnea  -continue CPAP nightly     Hx of cerebral aneurysm   -she said she used to have cerebral angiogram every 2 years, because of transportation she couldn't follow up for a while   -MRI and CT head no acute process  -advised her to follow up as an outpatient      Glaucoma   -Continue home eyedrops         CONSULTATIONS:   Cardiology  Psychiatry    PROCEDURES/SURGERIES: * No surgery found *    PENDING TEST RESULTS:   At the time of discharge the following test results are still pending: None    FOLLOW UP APPOINTMENTS:   PCP in 1 to 2 weeks for general medical follow-up and medications refills  Cardiology in 3 to 4 weeks or as previously scheduled  Cerebral angiogram as soon as feasible    ADDITIONAL CARE

## 2024-07-02 ENCOUNTER — TELEMEDICINE (OUTPATIENT)
Age: 74
End: 2024-07-02
Payer: MEDICARE

## 2024-07-02 DIAGNOSIS — I10 ESSENTIAL (PRIMARY) HYPERTENSION: ICD-10-CM

## 2024-07-02 DIAGNOSIS — E78.2 MIXED HYPERLIPIDEMIA: ICD-10-CM

## 2024-07-02 DIAGNOSIS — I48.20 CHRONIC ATRIAL FIBRILLATION (HCC): ICD-10-CM

## 2024-07-02 DIAGNOSIS — G47.33 OBSTRUCTIVE SLEEP APNEA (ADULT) (PEDIATRIC): Primary | ICD-10-CM

## 2024-07-02 PROCEDURE — 99443 PR PHYS/QHP TELEPHONE EVALUATION 21-30 MIN: CPT | Performed by: SPECIALIST

## 2024-07-02 ASSESSMENT — PATIENT HEALTH QUESTIONNAIRE - PHQ9
SUM OF ALL RESPONSES TO PHQ9 QUESTIONS 1 & 2: 0
SUM OF ALL RESPONSES TO PHQ QUESTIONS 1-9: 0
SUM OF ALL RESPONSES TO PHQ QUESTIONS 1-9: 0
2. FEELING DOWN, DEPRESSED OR HOPELESS: NOT AT ALL
SUM OF ALL RESPONSES TO PHQ QUESTIONS 1-9: 0
1. LITTLE INTEREST OR PLEASURE IN DOING THINGS: NOT AT ALL
SUM OF ALL RESPONSES TO PHQ QUESTIONS 1-9: 0

## 2024-07-02 NOTE — PROGRESS NOTES
Claudia Cantu is a 73 y.o. female evaluated via telephone on 7/2/2024.      Consent:  She and/or health care decision maker is aware that that she may receive a bill for this telephone service, depending on her insurance coverage, and has provided verbal consent to proceed: Yes    5/14 echo normal lvef, lae  5/14 lexiscan cardiolyte stress neg, lvef 70%  3/20 event monitor, 3 pauses max 3.3 secs, resolved after stopping verapamil, otherwise afib/flutter with controlled rate    6/24 lae otherwise normal echo     Current Outpatient Medications   Medication Sig Dispense Refill    calcium carbonate (TUMS) 500 MG chewable tablet Take 2 tablets by mouth 3 times daily as needed for Heartburn 30 tablet 0    cloNIDine (CATAPRES) 0.3 MG/24HR PTWK APPLY 1 PATCH TOPICALLY TO SKIN EVERY 8 DAYS OR AS DIRECTED. 12 patch 1    lovastatin (MEVACOR) 20 MG tablet Take 1 tablet by mouth nightly 90 tablet 1    rivaroxaban (XARELTO) 20 MG TABS tablet Take 1 tablet by mouth daily 90 tablet 1    benazepril (LOTENSIN) 10 MG tablet Take one tab by mouth twice daily. May take an extra tab (10 mg) for elevated BP, max 40 mg (4 tabs) daily. 180 tablet 1    cloNIDine (CATAPRES) 0.1 MG tablet TAKE ONE OR TWO TABLETS BY MOUTH AS NEEDED AS DIRECTED FOR ELEVATED BLOOD PRESSURE (SYSTOLIC BLOOD PRESSURE OVER 160). MAX: 2 TABLETS IN 4 HOUR PERIOD*IN ADDITION TO THE PATCH* 180 tablet 1    amLODIPine (NORVASC) 5 MG tablet TAKE ONE TABLET BY MOUTH DAILY AS NEEDED FOR ELEVATED BLOOD PRESSURE 90 tablet 1    SIMBRINZA 1-0.2 % SUSP       ascorbic acid (VITAMIN C) 500 MG tablet Take 2 tablets by mouth      Cholecalciferol 75 MCG (3000 UT) TABS Take 3,000 Units by mouth as needed      Sodium Fluoride (CLINPRO 5000) 1.1 % PSTE ceived the following from [facility]: Outside name: CLINPRO 5000 1.1 % pste       No current facility-administered medications for this visit.       Documentation:  Pt was contacted regarding appointment rescheduling and

## 2024-07-15 NOTE — PROGRESS NOTES
Physician Progress Note      PATIENT:               OSMANY HELM  [facility] #:                  [id number]  :                       1950  ADMIT DATE:       2024 1:17 PM  DISCH DATE:        2024 5:13 PM  RESPONDING  PROVIDER #:        Amber Guevara MD          QUERY TEXT:    Pt admitted with Dizziness. If possible, please document in progress notes and   discharge summary the etiology.    The medical record reflects the following:  Risk Factors: PMH significant for scoliosis uses wheelchair because of back   pain due to scoliosis, pulmonary fibrosis, HTN, chronic A-fib on Xarelto,   prediabetes, HLD,    Clinical Indicators: dizziness unable to stand,  -CT head no acute process  -Troponin high-sensitivity 27 normal  -EKG A-fib ventricular rate 84 bpm nonspecific ST-T wave  -MRI of the brain no acute intracranial abnormality.    Treatment: -Continue home Xarelto and statin  -Continue neurocheck and supportive care  -Consult PT/OT  -Continue cardiac monitoring    Please call if you have any questions or need assistance. I can also be   reached via [employer] or [employer] # [phone].  Thank you,  Elda Francisco RN/CDI  Options provided:  -- Dizziness due to, please specify etiology  -- Other - I will add my own diagnosis  -- Disagree - Not applicable / Not valid  -- Disagree - Clinically unable to determine / Unknown  -- Refer to Clinical Documentation Reviewer    PROVIDER RESPONSE TEXT:    This patient has dizziness due to Suspect lightheadedness due to orthostatic   blood pressure changes;    Query created by: Elda Francisco on 2024 2:28 PM      Electronically signed by:  Amber Guevara MD 7/15/2024 2:03 PM

## 2024-08-06 ENCOUNTER — TELEPHONE (OUTPATIENT)
Age: 74
End: 2024-08-06

## 2024-08-06 NOTE — TELEPHONE ENCOUNTER
Patient states she had four missed calls from our number. Patient stated she wants Charo to give her a call back, she wouldn't give reason. Patient would like a call back at phone # [phone].

## 2024-08-07 NOTE — TELEPHONE ENCOUNTER
Called pt. Verified patient's identity with two identifiers. She thinks a call was made to schedule 6 mo f/u. Scheduled 6 mo f/u phone visit. Patient verbalized understanding and denied further questions or concerns.

## 2024-08-19 ENCOUNTER — TELEPHONE (OUTPATIENT)
Age: 74
End: 2024-08-19

## 2024-08-19 DIAGNOSIS — I10 ESSENTIAL (PRIMARY) HYPERTENSION: Primary | ICD-10-CM

## 2024-08-19 NOTE — TELEPHONE ENCOUNTER
Patient called asking if she can take cough medicine or cough drops? She's been coughing for 2wks and she's been tested for covid and was negative, she said she noticed some swelling in her feet and wonder if she has congestive heart failure? Please advise. She ask if you can call her after 11:30am.    Patient # [phone]

## 2024-08-20 RX ORDER — LOSARTAN POTASSIUM 25 MG/1
25 TABLET ORAL DAILY
Qty: 30 TABLET | Refills: 5 | Status: SHIPPED | OUTPATIENT
Start: 2024-08-20

## 2024-08-20 NOTE — TELEPHONE ENCOUNTER
Peter Michael III, MD Cook, Jennifer L RN  Caller: Unspecified (Yesterday,  4:36 PM)  All ok as you outlined. Can stop benazapril for a few days, substitute losartan 25 mg per day. Also need to consider reflux or sinus issues as cause for cough. She will need to discuss with pcp

## 2024-08-20 NOTE — TELEPHONE ENCOUNTER
Tried calling pt earlier, but uable to reach.    Pt called. Verified patient's identity with two identifiers. Pt said she knows she probably had swelling when she spoke with Dr. Michael, but the cough is new. She read cough and feet swelling can be sxs of CHF. Explained echo done in June showed EF 60-65%, which is normal, so she does not have CHF. Pt said [facility] visited her yesterday and her covid test was negative, no cold, and NP told her that her lungs were clear. Asked if she is taking benazepril and she said it is prn, but she takes it usually once daily, maybe twice a day. I told her I could ask Dr. Michael if he thinks that it is the benazepril causing cough and if we could change to something different if so. Pt is worried to change meds, but agreed to try if that's what Dr. Michael advises.  I did tell her okay to use cough drops and cough medicine probably okay as long as it does not contain steroids or decongestant that can increase her BP.

## 2024-08-20 NOTE — TELEPHONE ENCOUNTER
Called pt. She agreed to plan. Verified patient's pharmacy. Rx sent. Patient verbalized understanding and denied further questions or concerns.     Requested Prescriptions     Signed Prescriptions Disp Refills    losartan (COZAAR) 25 MG tablet 30 tablet 5     Sig: Take 1 tablet by mouth daily     Authorizing Provider: ASHTYN MICHAEL III     Ordering User: DAVID JOHNSON       Per Dr. Michael's verbal order.

## 2024-09-08 DIAGNOSIS — I10 ESSENTIAL (PRIMARY) HYPERTENSION: ICD-10-CM

## 2024-09-08 DIAGNOSIS — I48.20 CHRONIC ATRIAL FIBRILLATION (HCC): ICD-10-CM

## 2024-09-08 DIAGNOSIS — E78.2 MIXED HYPERLIPIDEMIA: ICD-10-CM

## 2024-09-09 RX ORDER — CLONIDINE 0.3 MG/24H
PATCH, EXTENDED RELEASE TRANSDERMAL
Qty: 4 PATCH | Refills: 5 | Status: SHIPPED | OUTPATIENT
Start: 2024-09-09

## 2024-09-11 NOTE — TELEPHONE ENCOUNTER
2 pt identifiers used  [facility] reported she takes verapanil differently every day, it all depends on her B/P and pulse readings. She could not give me a guideline that she goes by. She reported she just knows how she feels and takes her meds that way. She also reported that she was told by Dr. Adeline Castro she could take 4 lotensin pills if she needed to. Explained in detail to not take verapamil at all or to take any of her other medications differently than her provider prescribed for her. She voiced concern of what to do if she had a high B/P or low pulse. She was instructed to call with any B/P or pulse issues any time needed. She also agreed if she just had a general question she would call during office hours. She agreed after long discussion to follow above guidelines. Writer had patient read back what she had written & confirmed with her she had written instructions down correctly.
Called number again-unable to leave message: recording states to enter access code.   Called brother # , Mirna Dyer asking for call back from [facility], [facility]
From Dr. Rosanne Easley:  Just got preventice report from [facility]. She is having some slow afib, so want to make sure she is only taking verapamil once per day, not twice per day. If she is taking it once a day at this point, she needs to stop it. Continue to wear monitor.
Mallorie Broderick MD  [facility], LPN             Afib/flutter is not a problem. Only concern is that she had some slow rate, that is why we need to check her meds.
Patient returned Amanda's call.       Phone:[phone] please call in the afternoon
Thanks. She is very difficult. May want to get ambulatory care team involved, hope you saw that email from rolan about how to access them, I printed it and its on my desk if you want to make a copy. Tell patient that if she continues to take verapamil, she may need a pacemaker. Would like to avoid that complication if possible.  thanks
Unable to leave voice message on phone. Sent a [employer] message asking for call back or respond to the message.
73

## 2024-10-08 ENCOUNTER — HOSPITAL ENCOUNTER (EMERGENCY)
Facility: HOSPITAL | Age: 74
Discharge: HOME | End: 2024-10-08
Attending: EMERGENCY MEDICINE
Payer: MEDICARE

## 2024-10-08 ENCOUNTER — OFFICE VISIT (OUTPATIENT)
Age: 74
End: 2024-10-08

## 2024-10-08 ENCOUNTER — APPOINTMENT (OUTPATIENT)
Facility: HOSPITAL | Age: 74
End: 2024-10-08
Payer: MEDICARE

## 2024-10-08 VITALS
SYSTOLIC BLOOD PRESSURE: 151 MMHG | RESPIRATION RATE: 18 BRPM | HEART RATE: 75 BPM | TEMPERATURE: 97 F | OXYGEN SATURATION: 95 % | DIASTOLIC BLOOD PRESSURE: 81 MMHG | HEIGHT: 62 IN | BODY MASS INDEX: 34.04 KG/M2 | WEIGHT: 185 LBS

## 2024-10-08 VITALS
OXYGEN SATURATION: 95 % | SYSTOLIC BLOOD PRESSURE: 127 MMHG | BODY MASS INDEX: 34.04 KG/M2 | TEMPERATURE: 98 F | HEIGHT: 62 IN | HEART RATE: 59 BPM | DIASTOLIC BLOOD PRESSURE: 79 MMHG | WEIGHT: 185 LBS

## 2024-10-08 DIAGNOSIS — M79.662 PAIN AND SWELLING OF LEFT LOWER LEG: Primary | ICD-10-CM

## 2024-10-08 DIAGNOSIS — M79.89 PAIN AND SWELLING OF LEFT LOWER LEG: Primary | ICD-10-CM

## 2024-10-08 DIAGNOSIS — M79.605 LEFT LEG PAIN: Primary | ICD-10-CM

## 2024-10-08 PROCEDURE — 99282 EMERGENCY DEPT VISIT SF MDM: CPT

## 2024-10-08 PROCEDURE — 93970 EXTREMITY STUDY: CPT

## 2024-10-08 ASSESSMENT — PAIN DESCRIPTION - FREQUENCY: FREQUENCY: INTERMITTENT

## 2024-10-08 ASSESSMENT — PAIN DESCRIPTION - PAIN TYPE: TYPE: ACUTE PAIN

## 2024-10-08 ASSESSMENT — PAIN DESCRIPTION - ONSET: ONSET: ON-GOING

## 2024-10-08 ASSESSMENT — PAIN DESCRIPTION - ORIENTATION: ORIENTATION: RIGHT;LEFT

## 2024-10-08 ASSESSMENT — ENCOUNTER SYMPTOMS: SHORTNESS OF BREATH: 0

## 2024-10-08 ASSESSMENT — PAIN DESCRIPTION - DESCRIPTORS: DESCRIPTORS: ACHING

## 2024-10-08 ASSESSMENT — PAIN - FUNCTIONAL ASSESSMENT: PAIN_FUNCTIONAL_ASSESSMENT: PREVENTS OR INTERFERES SOME ACTIVE ACTIVITIES AND ADLS

## 2024-10-08 ASSESSMENT — PAIN SCALES - GENERAL: PAINLEVEL_OUTOF10: 5

## 2024-10-08 ASSESSMENT — PAIN DESCRIPTION - LOCATION: LOCATION: LEG

## 2024-10-08 NOTE — PATIENT INSTRUCTIONS
GO TO ER FOR FURTHER EVALUATION & MANAGEMENT.     Discussed potential concerns for: left leg swelling, needs dopplers.   Recommend patient to seek care at the nearest emergency department for further evaluation.    Transportation:  private car

## 2024-10-08 NOTE — PROGRESS NOTES
TRIAGE NOTE TO THE EMERGENCY DEPARTMENT    CHIEF COMPLAINT    Chief Complaint   Patient presents with    Leg Concerns     Both legs are painful sometimes, leg and hands throb sometimes, needs are referral for at home physical therapy, claims leg are \" sinking\" in and the skin feels \"funny\". Also, has bump on forehead          MEDICAL DECISION MAKING    Patient presented with   Chief Complaint   Patient presents with    Leg Concerns     Both legs are painful sometimes, leg and hands throb sometimes, needs are referral for at home physical therapy, claims leg are \" sinking\" in and the skin feels \"funny\". Also, has bump on forehead     .  Discussed potential concerns for: Left leg swelling and pain, needs Doppler to rule out DVT.   Recommend patient to seek care at the nearest emergency department for further evaluation.      Transportation:  private car with     TEST / PROCEDURES COMPLETED AT URGENT CARE:  1.No results found for this visit on 10/08/24.  2.   Assessment & Plan       Visit Diagnoses and Associated Orders       Pain and swelling of left lower leg    -  Primary                  Subjective   HPI    Claudia Cantu is a 73 y.o. female who presents left leg swelling and pain      CURRENT MEDICATIONS    Current Outpatient Rx   Medication Sig Dispense Refill    cloNIDine (CATAPRES) 0.3 MG/24HR PTWK APPLY 1 PATCH TOPICALLY TO SKIN EVERY 8 DAYS OR AS DIRECTED 4 patch 5    losartan (COZAAR) 25 MG tablet Take 1 tablet by mouth daily 30 tablet 5    calcium carbonate (TUMS) 500 MG chewable tablet Take 2 tablets by mouth 3 times daily as needed for Heartburn 30 tablet 0    lovastatin (MEVACOR) 20 MG tablet Take 1 tablet by mouth nightly 90 tablet 1    rivaroxaban (XARELTO) 20 MG TABS tablet Take 1 tablet by mouth daily 90 tablet 1    cloNIDine (CATAPRES) 0.1 MG tablet TAKE ONE OR TWO TABLETS BY MOUTH AS NEEDED AS DIRECTED FOR ELEVATED BLOOD PRESSURE (SYSTOLIC BLOOD PRESSURE OVER 160). MAX: 2 TABLETS IN 4 HOUR

## 2024-10-08 NOTE — ED NOTES
6:06 PM    Has now been called x2 WNR.     6:16 PM  \"Am I going to see a doctor today?\". States \"I have been seeing scary changes in my legs because I don't walk enough. My legs are concave. They are scaring me.\" States that she was referred to the ED for a duplex.An  MD referred her to the ER because her LLE was tender on palpation.  Pt reports that she is frequently cancelling appointments because she doesn't feel well, which is why she doesn't have. Symptoms have been occurring for greater than 1 mos.     Denies F/C, N/V/D, cough, congestion, CP, SOB. Requesting x-rays to evaluate for muscle atrophy. No trauma/ injuries/ falls.     I have evaluated the patient as the Provider in Rapid Medical Evaluation (RME). I have reviewed her vital signs and the triage nurse assessment. I have talked with the patient and any available family and advised that I am the provider in triage and have ordered the appropriate study to initiate their work up based on the clinical presentation during my assessment. I have advised that the patient will be accommodated in the Main ED as soon as possible. I have also requested to contact the triage nurse or myself immediately if the patient experiences any changes in their condition during this brief waiting period.  ZARINA Cuevas NP, Leslie A, APRN - NP  10/08/24 1819       Nida Jain APRN - NP  10/08/24 1820

## 2024-10-08 NOTE — ED TRIAGE NOTES
Pt c/o bilateral leg pain and \"changes\" to leg size. Sx x 1 month. Pt sent from urgent care to r/o DVT

## 2024-10-09 NOTE — ED PROVIDER NOTES
[facility] EMERGENCY DEP  EMERGENCY DEPARTMENT ENCOUNTER      Pt Name: Claudia Cantu  MRN: [id number]  Birthdate 1950  Date of evaluation: 10/8/2024  Provider: Juan Domingo MD    CHIEF COMPLAINT       Chief Complaint   Patient presents with    Leg Pain         HISTORY OF PRESENT ILLNESS   73-year-old female with history significant for DM, HTN, depression presents to the ED with chief complaint of left flank pain and swelling for the past month.  Patient reports gradual increase in left leg swelling.  She was seen in urgent care today and referred to the emergency room for duplex.  She also says she has noticed some pain and swelling in her right leg.  No fevers, chills, chest pain, difficulty breathing, abdominal pain, urinary symptoms, or bowel symptoms.  No numbness or weakness.    The history is provided by the patient.       Review of External Medical Records:     Nursing Notes were reviewed.    REVIEW OF SYSTEMS       Review of Systems   Respiratory:  Negative for shortness of breath.    Cardiovascular:  Negative for chest pain.       Except as noted above the remainder of the review of systems was reviewed and negative.       PAST MEDICAL HISTORY     Past Medical History:   Diagnosis Date    Aneurysm (HCC)     supraclinoid/cerebral    Arrhythmia     AFib    Arthritis     Depression     Diabetes (HCC)     her doc said not she is prediabetic    Hypertension     Ill-defined condition     scoliosis    Ill-defined condition     Other ill-defined conditions(799.89)     glaucoma    Other ill-defined conditions(799.89)     increased cholesterol    Other ill-defined conditions(799.89)     scoliosis    Other ill-defined conditions(799.89)     back pain    Other ill-defined conditions(799.89)     insomnia    Other ill-defined conditions(799.89)     abnormal wt gain    Other ill-defined conditions(799.89)     breast lump - left side at 3 o'clock position    Other ill-defined conditions(799.89) 1994    shingles

## 2024-10-10 LAB — ECHO BSA: 1.92 M2

## 2024-10-25 ENCOUNTER — TRANSCRIBE ORDERS (OUTPATIENT)
Facility: HOSPITAL | Age: 74
End: 2024-10-25

## 2024-10-25 DIAGNOSIS — Z12.31 OTHER SCREENING MAMMOGRAM: Primary | ICD-10-CM

## 2024-12-16 ENCOUNTER — TELEPHONE (OUTPATIENT)
Age: 74
End: 2024-12-16

## 2024-12-16 NOTE — TELEPHONE ENCOUNTER
Patient wants to do virtual from home and send link to her mobile number. The patient wants to be triaged on Wednesday afternoon or Thursday afternoon before virtual appointment.  Patient stated its too difficult to come into the office due to blood pressure elevating sometimes. Patient stated she would have to pay the Aide $50.00 to bring her to the office.

## 2024-12-26 ENCOUNTER — TELEPHONE (OUTPATIENT)
Age: 74
End: 2024-12-26

## 2024-12-26 RX ORDER — BENAZEPRIL HYDROCHLORIDE 10 MG/1
10 TABLET ORAL 2 TIMES DAILY
Qty: 60 TABLET | Refills: 1 | Status: SHIPPED | OUTPATIENT
Start: 2024-12-26 | End: 2024-12-27 | Stop reason: SDUPTHER

## 2024-12-26 NOTE — TELEPHONE ENCOUNTER
Called patient, verified with 2 identifiers. Ms Cantu explained that dr. Michael ordered Losartan 25 mg back in August when Benazepril 10 mg BID was discontinued due to severe coughing.   Patient stated that cough was caused by an upper respiratory illness and not by the medication.  No coughing noted at this time.     Patient stated that she never changed to losartan because \"read that have too many side effects\" and she needs a refill on Benazepril.   Noted benazepril not medication list.  Patient asking to refill medication today since a friend picks up her medications and can do it tomorrow only.    Please advise.

## 2024-12-26 NOTE — TELEPHONE ENCOUNTER
Patient never swapped to new medication prescribed, she is still on Benazepril and is out of the  medication, please follow up with patient at [phone]

## 2024-12-26 NOTE — TELEPHONE ENCOUNTER
Per Zaida Felipe, APRN - NP   We can remove losartan from her med list. Confirm dose of lotensin and can order that for her.  Noted Benazepril 10 mg Bid previously verified with patient.   Losartan DC. Benazapril 10 mg added.

## 2024-12-27 ENCOUNTER — TELEPHONE (OUTPATIENT)
Age: 74
End: 2024-12-27

## 2024-12-27 DIAGNOSIS — E78.2 MIXED HYPERLIPIDEMIA: Primary | ICD-10-CM

## 2024-12-27 DIAGNOSIS — I10 ESSENTIAL (PRIMARY) HYPERTENSION: ICD-10-CM

## 2024-12-27 RX ORDER — BENAZEPRIL HYDROCHLORIDE 10 MG/1
10 TABLET ORAL 2 TIMES DAILY
Qty: 90 TABLET | Refills: 1 | Status: SHIPPED | OUTPATIENT
Start: 2024-12-27

## 2024-12-27 NOTE — PROGRESS NOTES
Telephone call from patient. Two patient identifiers verified. Pt verbalizes she will continue benazepril. She would like a refill for more tablets incase she needs to take more than 2 a day. Pt has no further questions at this time.    Requested Prescriptions     Signed Prescriptions Disp Refills    benazepril (LOTENSIN) 10 MG tablet 90 tablet 1     Sig: Take 1 tablet by mouth in the morning and at bedtime Take one tab by mouth twice daily. May take an extra tab (10 mg) for elevated BP, max 40 mg (4 tabs) daily.     VO per MD    Future Appointments   Date Time Provider Department Center   1/10/2025  3:40 PM Peter Michael III, MD [facility]   1/16/2025  3:00 PM Peter Michael III, MD [facility]

## 2024-12-27 NOTE — TELEPHONE ENCOUNTER
Left HIPAA compliant VM to continue taking benazapril (not losartan) and call back if further questions

## 2024-12-31 ENCOUNTER — APPOINTMENT (OUTPATIENT)
Facility: HOSPITAL | Age: 74
End: 2024-12-31
Payer: MEDICARE

## 2024-12-31 ENCOUNTER — HOSPITAL ENCOUNTER (EMERGENCY)
Facility: HOSPITAL | Age: 74
Discharge: HOME | End: 2024-12-31
Attending: EMERGENCY MEDICINE
Payer: MEDICARE

## 2024-12-31 VITALS
OXYGEN SATURATION: 99 % | RESPIRATION RATE: 20 BRPM | BODY MASS INDEX: 34.04 KG/M2 | HEIGHT: 62 IN | WEIGHT: 185 LBS | TEMPERATURE: 98.3 F | SYSTOLIC BLOOD PRESSURE: 161 MMHG | DIASTOLIC BLOOD PRESSURE: 73 MMHG | HEART RATE: 71 BPM

## 2024-12-31 DIAGNOSIS — M54.12 CERVICAL RADICULOPATHY: Primary | ICD-10-CM

## 2024-12-31 DIAGNOSIS — M43.6 TORTICOLLIS: ICD-10-CM

## 2024-12-31 PROCEDURE — 72125 CT NECK SPINE W/O DYE: CPT

## 2024-12-31 PROCEDURE — 99284 EMERGENCY DEPT VISIT MOD MDM: CPT

## 2024-12-31 PROCEDURE — 6370000000 HC RX 637 (ALT 250 FOR IP): Performed by: EMERGENCY MEDICINE

## 2024-12-31 RX ORDER — LIDOCAINE 50 MG/G
1 PATCH TOPICAL DAILY
Qty: 10 PATCH | Refills: 0 | Status: SHIPPED | OUTPATIENT
Start: 2024-12-31 | End: 2025-01-10

## 2024-12-31 RX ORDER — LIDOCAINE 4 G/G
1 PATCH TOPICAL DAILY
Status: DISCONTINUED | OUTPATIENT
Start: 2024-12-31 | End: 2024-12-31 | Stop reason: HOSPADM

## 2024-12-31 ASSESSMENT — PAIN DESCRIPTION - ORIENTATION: ORIENTATION: RIGHT

## 2024-12-31 ASSESSMENT — PAIN - FUNCTIONAL ASSESSMENT: PAIN_FUNCTIONAL_ASSESSMENT: PREVENTS OR INTERFERES SOME ACTIVE ACTIVITIES AND ADLS

## 2024-12-31 ASSESSMENT — PAIN DESCRIPTION - PAIN TYPE: TYPE: ACUTE PAIN

## 2024-12-31 ASSESSMENT — PAIN DESCRIPTION - ONSET: ONSET: PROGRESSIVE

## 2024-12-31 ASSESSMENT — PAIN DESCRIPTION - FREQUENCY: FREQUENCY: INTERMITTENT

## 2024-12-31 ASSESSMENT — PAIN DESCRIPTION - LOCATION: LOCATION: NECK

## 2024-12-31 ASSESSMENT — PAIN SCALES - GENERAL: PAINLEVEL_OUTOF10: 2

## 2024-12-31 NOTE — ED NOTES
Pt called out to request to sit in chair in room due to increasing pain. Paramedic consulted with provider and it was agreed to have patient move from bed to chair. Pt was educated on risk of sitting in chair including falling or sliding out of chair and patient consented to risk and moved to chair. RN and provider aware.

## 2024-12-31 NOTE — ED TRIAGE NOTES
ED visit d/t (R) neck pain - onset of sxs, 3 days ago - Endorses, worsening neck pain, worsened with movements - Denies recorded fevers / chills / SOB / coughing / N / V / D / dizziness / near syncopal / sick contacts;;

## 2024-12-31 NOTE — ED PROVIDER NOTES
[facility]  EMERGENCY DEPARTMENT ENCOUNTER      Pt Name: Claudia Cantu  MRN: [id number]  Birthdate 1950  Date of evaluation: 12/31/2024  Provider: Sandra Medeiros DO    CHIEF COMPLAINT       Chief Complaint   Patient presents with    Neck Pain         HISTORY OF PRESENT ILLNESS   (Location/Symptom, Timing/Onset, Context/Setting, Quality, Duration, Modifying Factors, Severity)  Note limiting factors.   HPI      Review of External Medical Records:     Nursing Notes were reviewed.    REVIEW OF SYSTEMS    (2-9 systems for level 4, 10 or more for level 5)     Review of Systems    Except as noted above the remainder of the review of systems was reviewed and negative.       PAST MEDICAL HISTORY     Past Medical History:   Diagnosis Date    Aneurysm (HCC)     supraclinoid/cerebral    Arrhythmia     AFib    Arthritis     Depression     Diabetes (HCC)     her doc said not she is prediabetic    Hypertension     Ill-defined condition     scoliosis    Ill-defined condition     Other ill-defined conditions(799.89)     glaucoma    Other ill-defined conditions(799.89)     increased cholesterol    Other ill-defined conditions(799.89)     scoliosis    Other ill-defined conditions(799.89)     back pain    Other ill-defined conditions(799.89)     insomnia    Other ill-defined conditions(799.89)     abnormal wt gain    Other ill-defined conditions(799.89)     breast lump - left side at 3 o'clock position    Other ill-defined conditions(799.89) 1994    shingles    Other ill-defined conditions(799.89)     CTS    Other ill-defined conditions(799.89)     colon polyps    Other ill-defined conditions(799.89)     cataracts    Other ill-defined conditions(799.89)     vitamin D deficiency - hx of    Other ill-defined conditions(799.89)     JAIMIE    Psychiatric disorder     depression/personality disorder/OCD    Scoliosis     Sleep apnea     Unspecified sleep apnea     sleep study 15 yrs ago/was told to come back and get a CPAP,

## 2025-01-04 DIAGNOSIS — E78.2 MIXED HYPERLIPIDEMIA: ICD-10-CM

## 2025-01-04 DIAGNOSIS — I48.20 CHRONIC ATRIAL FIBRILLATION (HCC): ICD-10-CM

## 2025-01-04 DIAGNOSIS — I10 ESSENTIAL (PRIMARY) HYPERTENSION: ICD-10-CM

## 2025-01-06 RX ORDER — RIVAROXABAN 20 MG/1
20 TABLET, FILM COATED ORAL DAILY
Qty: 30 TABLET | Refills: 5 | Status: SHIPPED | OUTPATIENT
Start: 2025-01-06

## 2025-01-06 NOTE — TELEPHONE ENCOUNTER
Requested Prescriptions     Signed Prescriptions Disp Refills    rivaroxaban (XARELTO) 20 MG TABS tablet 30 tablet 5     Sig: TAKE 1 TABLET BY MOUTH DAILY     Authorizing Provider: ASHTYN MICHAEL III     Ordering User: DAVID JOHNSON    Per Dr. Michael's verbal order.     Future Appointments   Date Time Provider Department Center   1/10/2025  3:40 PM Ashtyn Michael III, MD [facility]   1/16/2025  3:00 PM Ashtyn Michael III, MD [facility]

## 2025-01-08 ENCOUNTER — TELEPHONE (OUTPATIENT)
Age: 75
End: 2025-01-08

## 2025-01-08 NOTE — TELEPHONE ENCOUNTER
Patient called in stating that she has some edema in her legs going into her calves and is experiencing some SOB and would like to speak with nurse.      Phone [phone]

## 2025-01-10 ENCOUNTER — TELEMEDICINE (OUTPATIENT)
Age: 75
End: 2025-01-10

## 2025-01-10 DIAGNOSIS — E78.2 MIXED HYPERLIPIDEMIA: ICD-10-CM

## 2025-01-10 DIAGNOSIS — I10 ESSENTIAL (PRIMARY) HYPERTENSION: ICD-10-CM

## 2025-01-10 DIAGNOSIS — G47.33 OBSTRUCTIVE SLEEP APNEA (ADULT) (PEDIATRIC): ICD-10-CM

## 2025-01-10 DIAGNOSIS — I48.20 CHRONIC ATRIAL FIBRILLATION (HCC): Primary | ICD-10-CM

## 2025-01-10 RX ORDER — KETOCONAZOLE 20 MG/G
CREAM TOPICAL PRN
COMMUNITY

## 2025-01-10 ASSESSMENT — PATIENT HEALTH QUESTIONNAIRE - PHQ9
SUM OF ALL RESPONSES TO PHQ9 QUESTIONS 1 & 2: 2
SUM OF ALL RESPONSES TO PHQ QUESTIONS 1-9: 2
SUM OF ALL RESPONSES TO PHQ QUESTIONS 1-9: 2
2. FEELING DOWN, DEPRESSED OR HOPELESS: SEVERAL DAYS
SUM OF ALL RESPONSES TO PHQ QUESTIONS 1-9: 2
1. LITTLE INTEREST OR PLEASURE IN DOING THINGS: SEVERAL DAYS
SUM OF ALL RESPONSES TO PHQ QUESTIONS 1-9: 2

## 2025-01-10 NOTE — PROGRESS NOTES
Claudia Cantu is a 74 y.o. female evaluated via telephone on 1/10/2025.      Consent:  She and/or health care decision maker is aware that that she may receive a bill for this telephone service, depending on her insurance coverage, and has provided verbal consent to proceed: Yes    5/14 echo normal lvef, lae  5/14 lexiscan cardiolyte stress neg, lvef 70%  3/20 event monitor, 3 pauses max 3.3 secs, resolved after stopping verapamil, otherwise afib/flutter with controlled rate    6/24 lae otherwise normal echo    10/24 neg lower extremity venous dopplers     Current Outpatient Medications   Medication Sig Dispense Refill    ketoconazole (NIZORAL) 2 % cream Apply topically as needed      rivaroxaban (XARELTO) 20 MG TABS tablet TAKE 1 TABLET BY MOUTH DAILY 30 tablet 5    benazepril (LOTENSIN) 10 MG tablet Take 1 tablet by mouth in the morning and at bedtime Take one tab by mouth twice daily. May take an extra tab (10 mg) for elevated BP, max 40 mg (4 tabs) daily. (Patient taking differently: Take 1 tablet by mouth in the morning and at bedtime Take one tab by mouth twice daily. May take an extra tab (10 mg) for elevated BP, max 40 mg (4 tabs) daily.  Pt states  when her bp is low she only takes one per day or if it is elevated she may take 3) 90 tablet 1    cloNIDine (CATAPRES) 0.3 MG/24HR PTWK APPLY 1 PATCH TOPICALLY TO SKIN EVERY 8 DAYS OR AS DIRECTED 4 patch 5    calcium carbonate (TUMS) 500 MG chewable tablet Take 2 tablets by mouth 3 times daily as needed for Heartburn 30 tablet 0    lovastatin (MEVACOR) 20 MG tablet Take 1 tablet by mouth nightly 90 tablet 1    amLODIPine (NORVASC) 5 MG tablet TAKE ONE TABLET BY MOUTH DAILY AS NEEDED FOR ELEVATED BLOOD PRESSURE 90 tablet 1    SIMBRINZA 1-0.2 % SUSP       ascorbic acid (VITAMIN C) 500 MG tablet Take 2 tablets by mouth      Cholecalciferol 75 MCG (3000 UT) TABS Take 3,000 Units by mouth as needed      Sodium Fluoride (CLINPRO 5000) 1.1 % PSTE ceived the following

## 2025-02-17 DIAGNOSIS — I48.20 CHRONIC ATRIAL FIBRILLATION (HCC): ICD-10-CM

## 2025-02-17 DIAGNOSIS — E78.2 MIXED HYPERLIPIDEMIA: ICD-10-CM

## 2025-02-17 DIAGNOSIS — I10 ESSENTIAL (PRIMARY) HYPERTENSION: ICD-10-CM

## 2025-02-17 RX ORDER — LOVASTATIN 20 MG/1
20 TABLET ORAL NIGHTLY
Qty: 90 TABLET | Refills: 1 | Status: SHIPPED | OUTPATIENT
Start: 2025-02-17

## 2025-02-17 NOTE — TELEPHONE ENCOUNTER
Patient requested a refill on lovastatin 20mg, patient stated 2/18/25 is the only day she will have a ride to the pharmacy, [place] of the [place] after tomorrow.        [facility] [phone]

## 2025-02-17 NOTE — TELEPHONE ENCOUNTER
Requested Prescriptions     Signed Prescriptions Disp Refills    lovastatin (MEVACOR) 20 MG tablet 90 tablet 1     Sig: TAKE ONE TABLET BY MOUTH ONCE NIGHTLY     Authorizing Provider: ASHTYN VELA III     Ordering User: JORGE WHITE

## 2025-02-26 ENCOUNTER — TELEPHONE (OUTPATIENT)
Age: 75
End: 2025-02-26

## 2025-02-26 DIAGNOSIS — I10 ESSENTIAL (PRIMARY) HYPERTENSION: ICD-10-CM

## 2025-02-26 DIAGNOSIS — E78.2 MIXED HYPERLIPIDEMIA: ICD-10-CM

## 2025-02-26 DIAGNOSIS — I48.20 CHRONIC ATRIAL FIBRILLATION (HCC): ICD-10-CM

## 2025-02-26 RX ORDER — AMLODIPINE BESYLATE 5 MG/1
TABLET ORAL
Qty: 90 TABLET | Refills: 3 | Status: SHIPPED | OUTPATIENT
Start: 2025-02-26

## 2025-02-26 NOTE — TELEPHONE ENCOUNTER
Notified pt after 2 ID that her RX was sent to her pharmacy [facility] today about 10:30 am.Pt verbalized understanding

## 2025-02-26 NOTE — TELEPHONE ENCOUNTER
Patient is calling because she needs a refill on her Amlodipine 5 mg.    Pharmacy:  [facility] [id number] - [place], [place] - [facility] [address] - P [phone] - F [phone]     Patient [phone]

## 2025-02-26 NOTE — TELEPHONE ENCOUNTER
Requested Prescriptions     Signed Prescriptions Disp Refills    amLODIPine (NORVASC) 5 MG tablet 90 tablet 3     Sig: TAKE 1 TABLET BY MOUTH DAILY AS NEEDED FOR ELEVATED BLOOD PRESSURE     Authorizing Provider: ASHTYN VELA III     Ordering User: JOSE LYLES      No future appointments.   Per Verbal Order by MD/NP

## 2025-03-05 ENCOUNTER — TELEPHONE (OUTPATIENT)
Age: 75
End: 2025-03-05

## 2025-03-05 DIAGNOSIS — I48.20 CHRONIC ATRIAL FIBRILLATION (HCC): ICD-10-CM

## 2025-03-05 DIAGNOSIS — E78.2 MIXED HYPERLIPIDEMIA: ICD-10-CM

## 2025-03-05 DIAGNOSIS — I10 ESSENTIAL (PRIMARY) HYPERTENSION: ICD-10-CM

## 2025-03-06 RX ORDER — CLONIDINE 0.3 MG/24H
PATCH, EXTENDED RELEASE TRANSDERMAL
Qty: 4 PATCH | Refills: 5 | Status: SHIPPED | OUTPATIENT
Start: 2025-03-06

## 2025-03-06 NOTE — TELEPHONE ENCOUNTER
Requested Prescriptions     Signed Prescriptions Disp Refills    cloNIDine (CATAPRES) 0.3 MG/24HR PTWK 4 patch 5     Sig: APPLY 1 PATCH TOPICALLY TO SKIN EVERY 8 DAYS OR AS DIRECTED.     Authorizing Provider: ASHTYN MICHAEL III     Ordering User: JOSE LYLES      Future Appointments   Date Time Provider Department Center   4/24/2025  4:15 PM [facility] 6 [facility]   7/25/2025  2:20 PM Ashtyn Michael III, MD [facility]      Per Verbal Order by MD/NP

## 2025-04-15 DIAGNOSIS — I10 ESSENTIAL (PRIMARY) HYPERTENSION: ICD-10-CM

## 2025-04-16 RX ORDER — BENAZEPRIL HYDROCHLORIDE 10 MG/1
10 TABLET ORAL 2 TIMES DAILY
Qty: 90 TABLET | Refills: 1 | OUTPATIENT
Start: 2025-04-16

## 2025-04-16 NOTE — TELEPHONE ENCOUNTER
Requested Prescriptions     Signed Prescriptions Disp Refills    benazepril (LOTENSIN) 10 MG tablet 90 tablet 1     Sig: Take 1 tablet by mouth in the morning and at bedtime Take one tab by mouth twice daily. May take an extra tab (10 mg) for elevated BP, max 40 mg (4 tabs) daily.  Pt states  when her bp is low she only takes one per day or if it is elevated she may take 3     Authorizing Provider: ASHTYN MICHAEL III     Ordering User: JOSE LYLES      Future Appointments   Date Time Provider Department Center   4/24/2025  4:15 PM [facility] 6 [facility]   7/25/2025  2:20 PM Ashtyn Michael III, MD [facility]      Per Verbal Order by MD/NP

## 2025-05-09 DIAGNOSIS — I10 ESSENTIAL (PRIMARY) HYPERTENSION: ICD-10-CM

## 2025-05-12 RX ORDER — BENAZEPRIL HYDROCHLORIDE 10 MG/1
TABLET ORAL
Qty: 90 TABLET | Refills: 0 | Status: SHIPPED | OUTPATIENT
Start: 2025-05-12

## 2025-05-12 NOTE — TELEPHONE ENCOUNTER
Requested Prescriptions     Signed Prescriptions Disp Refills    benazepril (LOTENSIN) 10 MG tablet 90 tablet 0     Sig: TAKE 1 TABLET BY MOUTH TWICE DAILY (IN THE MORNING AND AT BEDTIME). MAY TAKE AN EXTRA TABLET FOR ELEVATED BP. **MAX OF 40MG DAILY**     Authorizing Provider: ASHTYN MICHAEL III     Ordering User: JOSE LYLES      Future Appointments   Date Time Provider Department Center   5/15/2025  4:15 PM [address]   7/25/2025  2:20 PM Ashtyn Michael III, MD [facility]      Per Verbal Order by MD/NP

## 2025-05-30 ENCOUNTER — TRANSCRIBE ORDERS (OUTPATIENT)
Facility: HOSPITAL | Age: 75
End: 2025-05-30

## 2025-05-30 DIAGNOSIS — Z12.31 ENCOUNTER FOR SCREENING MAMMOGRAM FOR BREAST CANCER: Primary | ICD-10-CM

## 2025-07-10 ENCOUNTER — TELEPHONE (OUTPATIENT)
Age: 75
End: 2025-07-10

## 2025-07-10 DIAGNOSIS — I48.20 CHRONIC ATRIAL FIBRILLATION (HCC): ICD-10-CM

## 2025-07-10 DIAGNOSIS — E78.2 MIXED HYPERLIPIDEMIA: ICD-10-CM

## 2025-07-10 DIAGNOSIS — I10 ESSENTIAL (PRIMARY) HYPERTENSION: ICD-10-CM

## 2025-07-10 NOTE — TELEPHONE ENCOUNTER
Requested Prescriptions     Signed Prescriptions Disp Refills    rivaroxaban (XARELTO) 20 MG TABS tablet 30 tablet 0     Sig: Take 1 tablet by mouth daily     Authorizing Provider: ASHTYN MICHAEL III     Ordering User: JOSE LYLES      Future Appointments   Date Time Provider Department Center   7/25/2025  2:20 PM Ashtyn Michael III, MD [facility]      Per Verbal Order by MD/NP

## 2025-07-17 DIAGNOSIS — I10 ESSENTIAL (PRIMARY) HYPERTENSION: ICD-10-CM

## 2025-07-18 ENCOUNTER — TELEPHONE (OUTPATIENT)
Age: 75
End: 2025-07-18

## 2025-07-18 RX ORDER — BENAZEPRIL HYDROCHLORIDE 10 MG/1
TABLET ORAL
Qty: 90 TABLET | Refills: 0 | Status: SHIPPED | OUTPATIENT
Start: 2025-07-18

## 2025-07-18 NOTE — TELEPHONE ENCOUNTER
Pt returned my call.    Asked if she had any recent labs done -states yes at [facility].    Spoke with Jenni Black) at [facility] who states that pt had BMP and HA1C drawn on 04/22/2025.    Fax number provided and she will fax these to us.    Received fax from [facility] with recent labs-placed on Dr. LISSY Michael's desk for review

## 2025-07-18 NOTE — TELEPHONE ENCOUNTER
Attempt to reach pt to ask when most recent labs were done- no recent labs in [facility]. [facility] has labs from 06/24/2024.  Phone recording asked for a remote code.  She has Dr. Michael listed as her primary so due to not reaching her on the phone,I am unable to request labs if she does see another provider.    Creatinine was 0.54   06/23/2024    Potassium was 5.0     06/23/2024  Requested Prescriptions     Signed Prescriptions Disp Refills    benazepril (LOTENSIN) 10 MG tablet 90 tablet 0     Sig: TAKE 1 TABLET BY MOUTH 2 TIMES A DAY (IN THE MORNING AND AT BEDTIME). MAY TAKE AN EXTRA TABLET FOR ELEVATED BLOOD PRESSURE. MAXIMUM OF 40MG DAILY.     Authorizing Provider: PETER MICHAEL III     Ordering User: JOSE LYLES      Future Appointments   Date Time Provider Department Center   7/25/2025  2:20 PM Peter Michael III, MD [facility]      Per Verbal Order by MD/NP

## 2025-08-10 DIAGNOSIS — I48.20 CHRONIC ATRIAL FIBRILLATION (HCC): ICD-10-CM

## 2025-08-10 DIAGNOSIS — I10 ESSENTIAL (PRIMARY) HYPERTENSION: ICD-10-CM

## 2025-08-10 DIAGNOSIS — E78.2 MIXED HYPERLIPIDEMIA: ICD-10-CM

## 2025-08-13 ENCOUNTER — TELEPHONE (OUTPATIENT)
Facility: HOSPITAL | Age: 75
End: 2025-08-13

## 2025-09-03 ENCOUNTER — TELEMEDICINE (OUTPATIENT)
Age: 75
End: 2025-09-03
Payer: MEDICARE

## 2025-09-03 DIAGNOSIS — I48.20 CHRONIC ATRIAL FIBRILLATION (HCC): ICD-10-CM

## 2025-09-03 DIAGNOSIS — E78.2 MIXED HYPERLIPIDEMIA: ICD-10-CM

## 2025-09-03 DIAGNOSIS — G47.33 OBSTRUCTIVE SLEEP APNEA (ADULT) (PEDIATRIC): ICD-10-CM

## 2025-09-03 DIAGNOSIS — I10 ESSENTIAL (PRIMARY) HYPERTENSION: Primary | ICD-10-CM

## 2025-09-03 PROCEDURE — 99447 NTRPROF PH1/NTRNET/EHR 11-20: CPT | Performed by: SPECIALIST
